# Patient Record
Sex: MALE | Race: WHITE | NOT HISPANIC OR LATINO | Employment: FULL TIME | URBAN - METROPOLITAN AREA
[De-identification: names, ages, dates, MRNs, and addresses within clinical notes are randomized per-mention and may not be internally consistent; named-entity substitution may affect disease eponyms.]

---

## 2017-01-16 ENCOUNTER — HOSPITAL ENCOUNTER (EMERGENCY)
Facility: HOSPITAL | Age: 67
Discharge: HOME | End: 2017-01-16
Attending: EMERGENCY MEDICINE
Payer: COMMERCIAL

## 2017-01-16 ENCOUNTER — TELEPHONE (OUTPATIENT)
Dept: SLEEP MEDICINE | Facility: OTHER | Age: 67
End: 2017-01-16

## 2017-01-16 VITALS
HEART RATE: 66 BPM | SYSTOLIC BLOOD PRESSURE: 131 MMHG | OXYGEN SATURATION: 98 % | RESPIRATION RATE: 18 BRPM | HEIGHT: 66 IN | TEMPERATURE: 98 F | BODY MASS INDEX: 29.73 KG/M2 | DIASTOLIC BLOOD PRESSURE: 78 MMHG | WEIGHT: 185 LBS

## 2017-01-16 DIAGNOSIS — R05.9 COUGH IN ADULT: ICD-10-CM

## 2017-01-16 DIAGNOSIS — R05.9 COUGH: ICD-10-CM

## 2017-01-16 DIAGNOSIS — J20.9 ACUTE BRONCHITIS, UNSPECIFIED ORGANISM: Primary | ICD-10-CM

## 2017-01-16 DIAGNOSIS — D84.9 IMMUNOCOMPROMISED STATE: ICD-10-CM

## 2017-01-16 LAB
ALBUMIN SERPL BCP-MCNC: 3.6 G/DL
ALP SERPL-CCNC: 37 U/L
ALT SERPL W/O P-5'-P-CCNC: 37 U/L
ANION GAP SERPL CALC-SCNC: 10 MMOL/L
AST SERPL-CCNC: 34 U/L
BASOPHILS # BLD AUTO: 0.01 K/UL
BASOPHILS NFR BLD: 0.1 %
BILIRUB SERPL-MCNC: 0.5 MG/DL
BUN SERPL-MCNC: 35 MG/DL
CALCIUM SERPL-MCNC: 8.9 MG/DL
CHLORIDE SERPL-SCNC: 102 MMOL/L
CO2 SERPL-SCNC: 27 MMOL/L
CREAT SERPL-MCNC: 2.3 MG/DL
DIFFERENTIAL METHOD: ABNORMAL
EOSINOPHIL # BLD AUTO: 0.2 K/UL
EOSINOPHIL NFR BLD: 2.2 %
ERYTHROCYTE [DISTWIDTH] IN BLOOD BY AUTOMATED COUNT: 13.7 %
EST. GFR  (AFRICAN AMERICAN): 33 ML/MIN/1.73 M^2
EST. GFR  (NON AFRICAN AMERICAN): 29 ML/MIN/1.73 M^2
GLUCOSE SERPL-MCNC: 94 MG/DL
HCT VFR BLD AUTO: 37.9 %
HGB BLD-MCNC: 13 G/DL
LYMPHOCYTES # BLD AUTO: 1.6 K/UL
LYMPHOCYTES NFR BLD: 23.6 %
MCH RBC QN AUTO: 33.2 PG
MCHC RBC AUTO-ENTMCNC: 34.3 %
MCV RBC AUTO: 97 FL
MONOCYTES # BLD AUTO: 1 K/UL
MONOCYTES NFR BLD: 14.7 %
NEUTROPHILS # BLD AUTO: 4 K/UL
NEUTROPHILS NFR BLD: 59.7 %
PLATELET # BLD AUTO: 170 K/UL
PMV BLD AUTO: 10.4 FL
POTASSIUM SERPL-SCNC: 3.8 MMOL/L
PROT SERPL-MCNC: 7 G/DL
RBC # BLD AUTO: 3.92 M/UL
SODIUM SERPL-SCNC: 139 MMOL/L
WBC # BLD AUTO: 6.81 K/UL

## 2017-01-16 PROCEDURE — 25000003 PHARM REV CODE 250: Performed by: EMERGENCY MEDICINE

## 2017-01-16 PROCEDURE — 96372 THER/PROPH/DIAG INJ SC/IM: CPT

## 2017-01-16 PROCEDURE — 63600175 PHARM REV CODE 636 W HCPCS: Performed by: EMERGENCY MEDICINE

## 2017-01-16 PROCEDURE — 99284 EMERGENCY DEPT VISIT MOD MDM: CPT | Mod: 25

## 2017-01-16 PROCEDURE — 85025 COMPLETE CBC W/AUTO DIFF WBC: CPT

## 2017-01-16 PROCEDURE — 80053 COMPREHEN METABOLIC PANEL: CPT

## 2017-01-16 RX ORDER — PROMETHAZINE HYDROCHLORIDE AND CODEINE PHOSPHATE 6.25; 1 MG/5ML; MG/5ML
SOLUTION ORAL
Qty: 240 ML | Refills: 0 | Status: SHIPPED | OUTPATIENT
Start: 2017-01-16 | End: 2017-02-08

## 2017-01-16 RX ORDER — LANOLIN ALCOHOL/MO/W.PET/CERES
400 CREAM (GRAM) TOPICAL DAILY
COMMUNITY
End: 2017-05-19

## 2017-01-16 RX ORDER — MOXIFLOXACIN HYDROCHLORIDE 400 MG/1
400 TABLET ORAL DAILY
Status: DISCONTINUED | OUTPATIENT
Start: 2017-01-16 | End: 2017-01-16 | Stop reason: HOSPADM

## 2017-01-16 RX ORDER — MOXIFLOXACIN HYDROCHLORIDE 400 MG/1
400 TABLET ORAL DAILY
Qty: 10 TABLET | Refills: 0 | Status: SHIPPED | OUTPATIENT
Start: 2017-01-16 | End: 2017-01-26

## 2017-01-16 RX ORDER — PROMETHAZINE HYDROCHLORIDE AND CODEINE PHOSPHATE 6.25; 1 MG/5ML; MG/5ML
SOLUTION ORAL
Qty: 240 ML | Refills: 0 | Status: SHIPPED | OUTPATIENT
Start: 2017-01-16 | End: 2017-01-16

## 2017-01-16 RX ORDER — MOXIFLOXACIN HYDROCHLORIDE 400 MG/1
400 TABLET ORAL DAILY
Qty: 10 TABLET | Refills: 0 | Status: SHIPPED | OUTPATIENT
Start: 2017-01-16 | End: 2017-01-16

## 2017-01-16 RX ORDER — IBUPROFEN 200 MG
1 CAPSULE ORAL DAILY
COMMUNITY
End: 2017-05-19

## 2017-01-16 RX ORDER — FLUTICASONE FUROATE AND VILANTEROL 200; 25 UG/1; UG/1
1 POWDER RESPIRATORY (INHALATION) DAILY
Qty: 28 EACH | Refills: 0 | Status: SHIPPED | OUTPATIENT
Start: 2017-01-16 | End: 2017-01-16

## 2017-01-16 RX ORDER — BETAMETHASONE SODIUM PHOSPHATE AND BETAMETHASONE ACETATE 3; 3 MG/ML; MG/ML
12 INJECTION, SUSPENSION INTRA-ARTICULAR; INTRALESIONAL; INTRAMUSCULAR; SOFT TISSUE
Status: COMPLETED | OUTPATIENT
Start: 2017-01-16 | End: 2017-01-16

## 2017-01-16 RX ORDER — FLUTICASONE FUROATE AND VILANTEROL 200; 25 UG/1; UG/1
1 POWDER RESPIRATORY (INHALATION) DAILY
Qty: 28 EACH | Refills: 0 | Status: SHIPPED | OUTPATIENT
Start: 2017-01-16 | End: 2017-02-08

## 2017-01-16 RX ADMIN — BETAMETHASONE SODIUM PHOSPHATE AND BETAMETHASONE ACETATE 12 MG: 3; 3 INJECTION, SUSPENSION INTRA-ARTICULAR; INTRALESIONAL; INTRAMUSCULAR at 09:01

## 2017-01-16 RX ADMIN — MOXIFLOXACIN HYDROCHLORIDE 400 MG: 400 TABLET, FILM COATED ORAL at 11:01

## 2017-01-16 NOTE — ED AVS SNAPSHOT
[facility]  [address] [place] [address]               Christophe Renteria   2017  7:55 AM   ED    Description:  Male : 1950   Department:  [facility]           Your Care was Coordinated By:     Provider Role From To    Jamarcus Cote MD Attending Provider 17 0756 --      Reason for Visit     Cough           Diagnoses this Visit        Comments    Acute bronchitis, unspecified organism    -  Primary Rule out right lower lobe pneumonia    Cough in adult         Cough         Immunocompromised state           ED Disposition     ED Disposition Condition Comment    Discharge             To Do List           Follow-up Information     Follow up with Gabriel Holman MD In 1 week.    Specialty:  Pulmonary Disease    Contact information:    [address] [place] [address] [address]  [facility] [address] [phone]          Follow up with Gabriel Holman MD In 3 days.    Specialty:  Pulmonary Disease    Contact information:    [address] [place] [address] [address]  [facility] [address]  [phone]         These Medications        Disp Refills Start End    moxifloxacin (AVELOX) 400 mg tablet 10 tablet 0 2017    Take 1 tablet (400 mg total) by mouth once daily. - Oral    [facility] [facility] #: [phone]       fluticasone-vilanterol (BREO ELLIPTA) 200-25 mcg/dose DsDv diskus inhaler 28 each 0 2017     Inhale 1 puff into the lungs once daily. - Inhalation    [place] [facility] [place] Ph #: [phone]       promethazine-codeine 6.25-10 mg/5 ml (PHENERGAN WITH CODEINE) 6.25-10 mg/5 mL syrup 240 mL 0 2017     1-2 teaspoons by mouth every 4-6 hours as needed for cough.    [place] [facility] [place] [facility] [place] Ph #: [phone]         [facility]     [facility] Nurse Care Line -   Assistance  Registered nurses in the [facility] provide clinical advisement, health education, appointment booking, and other advisory services.  Call for this free service at [phone].             Medications           Message regarding Medications     Verify the changes and/or additions to your medication regime listed below are the same as discussed with your clinician today.  If any of these changes or additions are incorrect, please notify your healthcare provider.        START taking these NEW medications        Refills    moxifloxacin (AVELOX) 400 mg tablet 0    Sig: Take 1 tablet (400 mg total) by mouth once daily.    Class: Print    Route: Oral    fluticasone-vilanterol (BREO ELLIPTA) 200-25 mcg/dose DsDv diskus inhaler 0    Sig: Inhale 1 puff into the lungs once daily.    Class: Print    Route: Inhalation    promethazine-codeine 6.25-10 mg/5 ml (PHENERGAN WITH CODEINE) 6.25-10 mg/5 mL syrup 0    Si-2 teaspoons by mouth every 4-6 hours as needed for cough.    Class: Print      These medications were administered today        Dose Freq    betamethasone acetate-betamethasone sodium phosphate injection 12 mg 12 mg ED 1 Time    Sig: Inject 2 mLs (12 mg total) into the muscle ED 1 Time.    Class: Normal    Route: Intramuscular           Verify that the below list of medications is an accurate representation of the medications you are currently taking.  If none reported, the list may be blank. If incorrect, please contact your healthcare provider. Carry this list with you in case of emergency.           Current Medications     ADALIMUMAB (HUMIRA PEN SUBQ) Inject 40 mg into the skin. EVERY 2 WEEKS    amlodipine (NORVASC) 10 MG tablet Take 1 tablet (10 mg total) by mouth once daily.    bisoprolol (ZEBETA) 10 MG tablet Take 1 tablet (10 mg total) by mouth once daily.    calcium citrate (CALCITRATE) 200 mg (950 mg) tablet Take 1 tablet by mouth once daily.    magnesium oxide (MAG-OX) 400 mg  "tablet Take 400 mg by mouth once daily.    multivitamin capsule Take 1 capsule by mouth once daily.    potassium citrate 15 mEq TbSR     pravastatin (PRAVACHOL) 40 MG tablet Take 1 tablet (40 mg total) by mouth once daily.    fluticasone-vilanterol (BREO ELLIPTA) 200-25 mcg/dose DsDv diskus inhaler Inhale 1 puff into the lungs once daily.    HUMIRA PEN PnKt injection     moxifloxacin (AVELOX) 400 mg tablet Take 1 tablet (400 mg total) by mouth once daily.    promethazine-codeine 6.25-10 mg/5 ml (PHENERGAN WITH CODEINE) 6.25-10 mg/5 mL syrup 1-2 teaspoons by mouth every 4-6 hours as needed for cough.           Clinical Reference Information           Your Vitals Were     BP Pulse Temp Resp Height Weight    141/80 64 98.1 °F (36.7 °C) 18 5' 6" (1.676 m) 83.9 kg (185 lb)    SpO2 BMI             96% 29.86 kg/m2         Allergies as of 1/16/2017     No Known Allergies      Immunizations Administered on Date of Encounter - 1/16/2017     None      ED Micro, Lab, POCT     Start Ordered       Status Ordering Provider    01/16/17 0939 01/16/17 0938  Comprehensive metabolic panel  STAT      Final result     01/16/17 0939 01/16/17 0938  CBC auto differential  STAT      Final result       ED Imaging Orders     Start Ordered       Status Ordering Provider    01/16/17 0843 01/16/17 0843  CT Chest Without Contrast  1 time imaging      Final result     01/16/17 0809 01/16/17 0808    1 time imaging,   Status:  Canceled      Canceled     01/16/17 0751 01/16/17 0750  X-Ray Chest PA And Lateral  1 time imaging      Final result         Discharge Instructions       Please return immediately if you get worse or if new problems develop.  Please follow-up with your pulmonologist in 72 hours.  Lots of liquids.  Return if you feel bad, develop shortness of breath, or develop fever.  Rest.    Your Scheduled Appointments     Jan 17, 2017  1:00 PM CST   Procedure with HOME STUDY, SLEEP   [facility] ([facility])    [address] [facility] [address]   [phone]              Your Future Surgeries/Procedures     Feb 13, 2017   Surgery with Ricky Sanders MD   [facility] ([facility]    [address] [place] [address]   [phone]              [facility] Sign-Up     Activating your [facility] account is as easy as 1-2-3!     1) Visit [email or web address], select Sign Up Now, enter this activation code and your date of birth, then select Next.  [email or web address]  Expires: 1/30/2017  9:59 AM      2) Create a username and password to use when you visit [facility] in the future and select a security question in case you lose your password and select Next.    3) Enter your e-mail address and click Sign Up!    Additional Information  If you have questions, please e-mail [email or web address] or call [phone] to talk to our [facility] staff. Remember, [facility] is NOT to be used for urgent needs. For medical emergencies, dial 911.          [facility] complies with applicable Federal civil rights laws and does not discriminate on the basis of race, color, national origin, age, disability, or sex.        Language Assistance Services     ATTENTION: Language assistance services are available, free of charge. Please call [phone].      ATENCIÓN: Si habla español, tiene a wilkerson disposición servicios gratuitos de asistencia lingüística. Llame al [phone].     CHÚ Ý: N?u b?n nói Ti?ng Vi?t, có các d?ch v? h? tr? ngôn ng? mi?n phí dành cho b?n. G?i s? [phone].

## 2017-01-16 NOTE — ED TRIAGE NOTES
Pt presents to ED with c/o non-productive cough x 1 week  He reports cough went away and returned on Friday night after a hunting trip  Also reports slight CP with cough and runny nose. Denies fever or chills.

## 2017-01-16 NOTE — ED PROVIDER NOTES
Encounter Date: 1/16/2017    SCRIBE #1 NOTE: I, Bebe Silverman, am scribing for, and in the presence of,  Jamarcus Cote MD. I have scribed the following portions of the note - Other sections scribed: HPI and ROS.   SCRIBE #2 NOTE: IDavid, am scribing for, and in the presence of,  Jamarcus Cote MD. I have scribed the following portions of the note - Other sections scribed: HPI, ROS.     History     Chief Complaint   Patient presents with    Cough     Pt reports cough x 5 days unrelieved by home medications     Review of patient's allergies indicates:  No Known Allergies  HPI Comments: CC: Cough    HPI: Patient is a 66 y.o. M with a past medical history of Arthritis; Brainstem infarction; Hypertension; Lumbar disc disease; Psoriatic arthritis; and Renal disease presents to the ED for evaluation of a worsening cough x1 week. He notes that his symptoms improved 5 days ago, but worsened 3 days ago. He describes his pain as acute and moderate. There's associated sharp chest pain secondary to cough, a sore throat, and rhinorrhea. There's no attempted treatment. Patient denies any fever, chills, dysuria, nausea, vomiting, diarrhea, or abdominal pain.    The history is provided by the patient. No  was used.     Past Medical History   Diagnosis Date    Arthritis     Brainstem infarction      without residual effects    Hypertension     Lumbar disc disease     Psoriatic arthritis     Renal disease      Past Medical History Pertinent Negatives   Diagnosis Date Noted    High fever 11/11/2015    Malignant hyperthermia 11/11/2015    PONV (postoperative nausea and vomiting) 11/11/2015    Pseudocholinesterase deficiency 11/11/2015     Past Surgical History   Procedure Laterality Date    Fracture surgery      Joint replacement      Rotator cuff repair Right     Ankle surgery Right     Back surgery       Family History   Problem Relation Age of Onset    Cancer Mother     Hypertension  Father     Stroke Father      Social History   Substance Use Topics    Smoking status: Never Smoker    Smokeless tobacco: None    Alcohol use 0.0 oz/week     Review of Systems   Constitutional: Negative for chills and fever.   HENT: Positive for rhinorrhea and sore throat.    Eyes: Negative for redness.   Respiratory: Positive for cough.    Cardiovascular: Positive for chest pain (secondary to cough).   Gastrointestinal: Negative for abdominal pain, diarrhea, nausea and vomiting.   Genitourinary: Negative for dysuria.   Musculoskeletal: Negative for back pain.   Skin: Negative for rash and wound.   Neurological: Negative for headaches.       Physical Exam   Initial Vitals   BP Pulse Resp Temp SpO2   01/16/17 0744 01/16/17 0744 01/16/17 0744 01/16/17 0744 01/16/17 0744   135/82 68 18 98.7 °F (37.1 °C) 95 %     Physical Exam  The patient was examined specifically for the following:   General:No significant distress, Good color, Warm and dry. Head and neck:Scalp atraumatic, Neck supple. Neurological:Appropriate conversation, Gross motor deficits. Eyes:Conjugate gaze, Clear corneas. ENT: No epistaxis. Cardiac: Regular rate and rhythm, Grossly normal heart tones. Pulmonary: Wheezing, Rales. Gastrointestinal: Abdominal tenderness, Abdominal distention. Musculoskeletal: Extremity deformity, Apparent pain with range of motion of the joints. Skin: Rash.   The findings on examination were normal except for the following: The patient has a cough during the physical examination.  The lungs are clear and free wheezing rales of the rhonchi.  Heart tones are normal.  The patient has a regular rate and rhythm.  The abdomen is nontender.  ED Course   Procedures  Labs Reviewed   COMPREHENSIVE METABOLIC PANEL - Abnormal; Notable for the following:        Result Value    BUN, Bld 35 (*)     Creatinine 2.3 (*)     Alkaline Phosphatase 37 (*)     eGFR if  33 (*)     eGFR if non  29 (*)     All other  components within normal limits   CBC W/ AUTO DIFFERENTIAL - Abnormal; Notable for the following:     RBC 3.92 (*)     Hemoglobin 13.0 (*)     Hematocrit 37.9 (*)     MCH 33.2 (*)     All other components within normal limits          X-Rays:   Independently Interpreted Readings:   Other Readings:  Chest x-ray fails to reveal pneumothorax pneumonia pleural effusion.  There is the possibility of a small right lung nodule    CT of the chest reveals a possible infiltrate posteriorly on the right.  No nodules are noted.      Medical decision making: Given the above consultation was obtained with Dr. Holman, who advised treatment with Avelox and Breo and began with codeine.  The case is, located by the fact that the patient is currently on Humira for arthritis.  I will treat as directed by , and send the patient to follow-up with him. We'll have him return if he gets worse or if new problems develop, especially feeling bad, fever, shortness of breath.      Dr. Holman the patient in the emergency room.  Avelox, Breo Elipta were recommended as well as Phenergan with codeine.  I doubt pneumonia.  CT was negative for significant mass.  We didn't identify a possible pneumonia on CT on the right side posteriorly.  I reviewed CT scan and x-ray with .              Scribe Attestation:   Scribe #1: I performed the above scribed service and the documentation accurately describes the services I performed. I attest to the accuracy of the note.  Scribe #2: I performed the above scribed service and the documentation accurately describes the services I performed. I attest to the accuracy of the note.    Attending Attestation:           Physician Attestation for Scribe:  Physician Attestation Statement for Scribe #1: I, Jamarcus Cote MD, reviewed documentation, as scribed by Bebe Silverman in my presence, and it is both accurate and complete.   Physician Attestation Statement for Scribe #2: I, Jamarcus Cote MD,  reviewed documentation, as scribed by David Rios in my presence, and it is both accurate and complete. I also acknowledge and confirm the content of the note done by Scribe #1.              ED Course     Clinical Impression:   The primary encounter diagnosis was Acute bronchitis, unspecified organism. Diagnoses of Cough in adult, Cough, and Immunocompromised state were also pertinent to this visit.          Jamarcus Cote MD  01/24/17 0151

## 2017-01-16 NOTE — TELEPHONE ENCOUNTER
Called to confirm his home sleep study for Jan 17th.  He canceled not feeling well.  He will call when he's able to reschedule.

## 2017-01-16 NOTE — DISCHARGE INSTRUCTIONS
Please return immediately if you get worse or if new problems develop.  Please follow-up with your pulmonologist in 72 hours.  Lots of liquids.  Return if you feel bad, develop shortness of breath, or develop fever.  Rest.

## 2017-01-23 ENCOUNTER — TELEPHONE (OUTPATIENT)
Dept: SLEEP MEDICINE | Facility: OTHER | Age: 67
End: 2017-01-23

## 2017-01-25 ENCOUNTER — TELEPHONE (OUTPATIENT)
Dept: SLEEP MEDICINE | Facility: OTHER | Age: 67
End: 2017-01-25

## 2017-01-25 NOTE — TELEPHONE ENCOUNTER
Returned patients call about schedule his home sleep study.  Not feeling well,he will call back to schedule.

## 2017-02-01 ENCOUNTER — TELEPHONE (OUTPATIENT)
Dept: SLEEP MEDICINE | Facility: OTHER | Age: 67
End: 2017-02-01

## 2017-02-08 ENCOUNTER — HOSPITAL ENCOUNTER (OUTPATIENT)
Dept: PREADMISSION TESTING | Facility: HOSPITAL | Age: 67
Discharge: HOME | End: 2017-02-08
Attending: ORTHOPAEDIC SURGERY
Payer: COMMERCIAL

## 2017-02-08 VITALS
BODY MASS INDEX: 29.41 KG/M2 | RESPIRATION RATE: 17 BRPM | TEMPERATURE: 97 F | HEIGHT: 67 IN | SYSTOLIC BLOOD PRESSURE: 128 MMHG | DIASTOLIC BLOOD PRESSURE: 77 MMHG | WEIGHT: 187.38 LBS | OXYGEN SATURATION: 98 % | HEART RATE: 61 BPM

## 2017-02-08 DIAGNOSIS — Z01.818 PRE-OP TESTING: Primary | ICD-10-CM

## 2017-02-08 LAB
APTT BLDCRRT: 26 SEC
BILIRUB UR QL STRIP: NEGATIVE
CLARITY UR: CLEAR
COLOR UR: YELLOW
GLUCOSE UR QL STRIP: ABNORMAL
HGB UR QL STRIP: NEGATIVE
INR PPP: 1
KETONES UR QL STRIP: NEGATIVE
LEUKOCYTE ESTERASE UR QL STRIP: NEGATIVE
NITRITE UR QL STRIP: NEGATIVE
PH UR STRIP: 7 [PH] (ref 5–8)
PROT UR QL STRIP: NEGATIVE
PROTHROMBIN TIME: 10.8 SEC
SP GR UR STRIP: 1.01 (ref 1–1.03)
URN SPEC COLLECT METH UR: ABNORMAL
UROBILINOGEN UR STRIP-ACNC: NEGATIVE EU/DL

## 2017-02-08 PROCEDURE — 93005 ELECTROCARDIOGRAM TRACING: CPT

## 2017-02-08 PROCEDURE — 85730 THROMBOPLASTIN TIME PARTIAL: CPT

## 2017-02-08 PROCEDURE — 85610 PROTHROMBIN TIME: CPT

## 2017-02-08 PROCEDURE — 36415 COLL VENOUS BLD VENIPUNCTURE: CPT

## 2017-02-08 PROCEDURE — 81003 URINALYSIS AUTO W/O SCOPE: CPT

## 2017-02-08 NOTE — IP AVS SNAPSHOT
[place] [address] [address] [address]  Phone: [phone]           Patient Discharge Instructions    Our goal is to set you up for success. This packet includes information on your condition, medications, and your home care. It will help you to care for yourself so you don't get sicker.     Please ask your nurse if you have any questions.        There are many details to remember when preparing for your surgery. Here is what you will need to do, please ask your nurse if there are more specific instructions and if you have any questions:    1. 24 hours before procedure Do not smoke or drink alcoholic beverages 24 hours prior to your procedure    2. Eating before procedure Do not eat or drink anything 8 hours before your procedure - this includes gum, mints, and candy.     3. Day of procedure Please remove all jewelry for the procedure. If you wear contact lenses, dentures, hearing aids or glasses, bring a container to put them in during your surgery and give to a family member for safekeeping.  If your doctor has scheduled you for an overnight stay, bring a small overnight bag with any personal items that you need.    4. After procedure Make arrangements in advance for transportation home by a responsible adult. It is not safe to drive a vehicle during the 24 hours following surgery.     PLEASE NOTE: You may be contacted the day before your surgery to confirm your surgery date and arrival time. The Surgery schedule has many variables which may affect the time of your surgery case. Family members should be available if your surgery time changes.                ** Verify the list of medication(s) below is accurate and up to date. Carry this with you in case of emergency. If your medications have changed, please notify your healthcare provider.             Medication List      TAKE these medications        Additional Info                      amlodipine 10 MG tablet   Commonly known  as:  NORVASC   Quantity:  90 tablet   Refills:  3   Dose:  10 mg    Instructions:  Take 1 tablet (10 mg total) by mouth once daily.     Begin Date    AM    Noon    PM    Bedtime       bisoprolol 10 MG tablet   Commonly known as:  ZEBETA   Quantity:  30 tablet   Refills:  11   Dose:  10 mg    Instructions:  Take 1 tablet (10 mg total) by mouth once daily.     Begin Date    AM    Noon    PM    Bedtime       calcium citrate 200 mg (950 mg) tablet   Commonly known as:  CALCITRATE   Refills:  0   Dose:  1 tablet    Instructions:  Take 1 tablet by mouth once daily.     Begin Date    AM    Noon    PM    Bedtime       HUMIRA PEN SUBQ   Refills:  0   Dose:  40 mg   What changed:  Another medication with the same name was removed. Continue taking this medication, and follow the directions you see here.    Instructions:  Inject 40 mg into the skin. EVERY 2 WEEKS     Begin Date    AM    Noon    PM    Bedtime       magnesium oxide 400 mg tablet   Commonly known as:  MAG-OX   Refills:  0   Dose:  400 mg    Instructions:  Take 400 mg by mouth once daily.     Begin Date    AM    Noon    PM    Bedtime       multivitamin capsule   Refills:  0   Dose:  1 capsule    Instructions:  Take 1 capsule by mouth once daily.     Begin Date    AM    Noon    PM    Bedtime       potassium citrate 15 mEq Tbsr   Refills:  0      Begin Date    AM    Noon    PM    Bedtime       pravastatin 40 MG tablet   Commonly known as:  PRAVACHOL   Quantity:  90 tablet   Refills:  3   Dose:  40 mg    Instructions:  Take 1 tablet (40 mg total) by mouth once daily.     Begin Date    AM    Noon    PM    Bedtime                  Please bring to all follow up appointments:    1. A copy of your discharge instructions.  2. All medicines you are currently taking in their original bottles.  3. Identification and insurance card.    Please arrive 15 minutes ahead of scheduled appointment time.    Please call 24 hours in advance if you must reschedule your appointment and/or  time.        Your Scheduled Appointments     Feb 12, 2017  9:00 AM CST   Non-Fasting Lab with [facility]    [address] [id number] [phone]              Your Future Surgeries/Procedures     Feb 13, 2017   Surgery with Ricky Sanders MD   [facility] ([facility]    [address] [id number] [phone]                Discharge Instructions     Future Orders    Type & Screen         Discharge Instructions       For this procedure you will shower at home the night before and also the morning of surgery with HIBICLENS soap provided by the pre op nurse. Do not use this soap on your face or genitals. You will shower a third time here at the hospital on the morning of surgery. Rinse completely after each shower.  Please place clean linens on your bed the night before surgery. Please wear fresh clean clothing after each shower.  No shaving of procedural area at least 4-5 days before surgery due to increased risk of skin irritation and/or possible infection.      Your surgery is scheduled for _Monday Feb 13, 2017___.    Call [phone] between 2 p.m. and 5 p.m. on   _Friday___ to find out your arrival time for the day of your surgery.      Please report to SAME DAY SURGERY UNIT on the 2nd FLOOR at _______ a.m.  Use front door entrance. The doors open at 0530 am.        INSTRUCTIONS IMPORTANT!!!  ¨ Do not eat or drink after 12 midnight-including water. OK to brush teeth, no   gum, candy or mints!    ¨ Take only these medicines with a small swallow of water-morning of surgery.  Take Amlodipine and Zebeta am of surgery with swallow of water.      _x___  Return to Hospital Lab on _Sunday 2-12-17 at 9:00 am__for additional blood test.    _x___  Prep instructions:   SHOWER     _x___  Please shower using Hibiclens soap the night before AND  the morning of your surgery/procedure. Do not use  "Hibiclens on your face or genitals     _x___  No powder, lotions or creams to surgical area.  _x___  You may wear only deodorant on the day of surgery.  _x___  Please remove all jewelry, including piercings and leave at home.  _x___  No money or valuables needed. Please leave at home.  You may bring your cell phone.    _x___  Wear loose fitting clothing. Allow for dressings, bandages.  _x___  Stop Aspirin, Ibuprofen, Motrin and Aleve at least 3-5 days before surgery, unless otherwise instructed by your doctor, or the nurse.              You MAY use Tylenol/acetaminophen until day of surgery.    _x___  Call MD for temperature above 101 degrees.        _x___ Stop taking any Fish Oil supplement or any Vitamins that contain Vitamin E at least 5 days prior to surgery.          I have read or had read and explained to me, and understand the above information.  Additional comments or instructions:Please call   [phone] if you have any questions regarding the instructions above.                 Admission Information     Date & Time Provider Department CSN    2/8/2017  8:30 AM Ricky Sanders MD [facility] [id number]      Care Providers     Provider Role Specialty Primary office phone    Ricky Sanders MD Attending Provider Orthopedic Surgery [phone]      Your Vitals Were     BP Pulse Temp Resp Height Weight    128/77 (BP Location: Left arm, Patient Position: Sitting, BP Method: Automatic) 61 97.4 °F (36.3 °C) (Oral) 17 5' 7" (1.702 m) 85 kg (187 lb 6.3 oz)    SpO2 BMI             98% 29.35 kg/m2         Recent Lab Values        2/18/2016                           3:55 PM           A1C 5.2                       Allergies as of 2/8/2017     No Known Allergies      [facility]     [facility] Nurse Care Line - 24/7 Assistance  Unless otherwise directed by your provider, please contact [facility], our nurse care line that is available for 24/7 assistance.     Registered nurses in " the [facility] provide clinical advisement, health education, appointment booking, and other advisory services.  Call for this free service at [phone].        Advance Directives     An advance directive is a document which, in the event you are no longer able to make decisions for yourself, tells your healthcare team what kind of treatment you do or do not want to receive, or who you would like to make those decisions for you.  If you do not currently have an advance directive, [facility] encourages you to create one.  For more information call:  [phone] ([phone]), [phone] ([phone]),  or log on to [email or web address].        Smoking Cessation     If you would like to quit smoking:   You may be eligible for free services if you are a [place] resident and started smoking cigarettes before September 1, 1988.  Call the Smoking Cessation Trust ([facility]) toll free at (250) [phone] or ([phone].   Call 2-947-QUIT-NOW if you do not meet the above criteria.            Language Assistance Services     ATTENTION: Language assistance services are available, free of charge. Please call [phone].      ATENCIÓN: Si habla español, tiene a wilkerson disposición servicios gratuitos de asistencia lingüística. Llame al [phone].     CHÚ Ý: N?u b?n nói Ti?ng Vi?t, có các d?ch v? h? tr? ngôn ng? mi?n phí dành cho b?n. G?i s? [phone].        [facility] Sign-Up     Activating your [facility] account is as easy as 1-2-3!     1) Visit [email or web address], select Sign Up Now, enter this activation code and your date of birth, then select Next.  [email or web address]  Expires: 3/25/2017  9:14 AM      2) Create a username and password to use when you visit [facility] in the future and select a security question in case you lose your password and select Next.    3) Enter your e-mail address and click Sign Up!    Additional Information  If you have questions, please e-mail [email or web address] or  call [phone] to talk to our [facility] staff. Remember, [facility] is NOT to be used for urgent needs. For medical emergencies, dial 911.          [facility] complies with applicable Federal civil rights laws and does not discriminate on the basis of race, color, national origin, age, disability, or sex.

## 2017-02-08 NOTE — DISCHARGE INSTRUCTIONS
For this procedure you will shower at home the night before and also the morning of surgery with HIBICLENS soap provided by the pre op nurse. Do not use this soap on your face or genitals. You will shower a third time here at the hospital on the morning of surgery. Rinse completely after each shower.  Please place clean linens on your bed the night before surgery. Please wear fresh clean clothing after each shower.  No shaving of procedural area at least 4-5 days before surgery due to increased risk of skin irritation and/or possible infection.      Your surgery is scheduled for _Monday Feb 13, 2017___.    Call [phone] between 2 p.m. and 5 p.m. on   _Friday___ to find out your arrival time for the day of your surgery.      Please report to SAME DAY SURGERY UNIT on the 2nd FLOOR at _______ a.m.  Use front door entrance. The doors open at 0530 am.        INSTRUCTIONS IMPORTANT!!!  ¨ Do not eat or drink after 12 midnight-including water. OK to brush teeth, no   gum, candy or mints!    ¨ Take only these medicines with a small swallow of water-morning of surgery.  Take Amlodipine and Zebeta am of surgery with swallow of water.      _x___  Return to Hospital Lab on _Sunday 2-12-17 at 9:00 am__for additional blood test.    _x___  Prep instructions:   SHOWER     _x___  Please shower using Hibiclens soap the night before AND  the morning of your surgery/procedure. Do not use Hibiclens on your face or genitals     _x___  No powder, lotions or creams to surgical area.  _x___  You may wear only deodorant on the day of surgery.  _x___  Please remove all jewelry, including piercings and leave at home.  _x___  No money or valuables needed. Please leave at home.  You may bring your cell phone.    _x___  Wear loose fitting clothing. Allow for dressings, bandages.  _x___  Stop Aspirin, Ibuprofen, Motrin and Aleve at least 3-5 days before surgery, unless otherwise instructed by your doctor, or the nurse.              You MAY use  Tylenol/acetaminophen until day of surgery.    _x___  Call MD for temperature above 101 degrees.        _x___ Stop taking any Fish Oil supplement or any Vitamins that contain Vitamin E at least 5 days prior to surgery.          I have read or had read and explained to me, and understand the above information.  Additional comments or instructions:Please call   [phone] if you have any questions regarding the instructions above.

## 2017-02-08 NOTE — PRE-PROCEDURE INSTRUCTIONS
Pre operative instructions, medication directives and pain scales/post op pain management discussed with patient. Instructed to wash with Hibiclens prior to surgery as per protocol.  All questions were answered. Patient re-assured regarding surgical procedure and day of surgery routine as needed. Patient verbalized understanding of pre-op instructions.

## 2017-02-12 ENCOUNTER — ANESTHESIA EVENT (OUTPATIENT)
Dept: SURGERY | Facility: HOSPITAL | Age: 67
DRG: 470 | End: 2017-02-12
Payer: COMMERCIAL

## 2017-02-13 ENCOUNTER — SURGERY (OUTPATIENT)
Age: 67
End: 2017-02-13

## 2017-02-13 ENCOUNTER — HOSPITAL ENCOUNTER (INPATIENT)
Facility: HOSPITAL | Age: 67
LOS: 2 days | Discharge: HOME HEALTH | DRG: 470 | End: 2017-02-15
Attending: ORTHOPAEDIC SURGERY | Admitting: ORTHOPAEDIC SURGERY
Payer: COMMERCIAL

## 2017-02-13 ENCOUNTER — ANESTHESIA (OUTPATIENT)
Dept: SURGERY | Facility: HOSPITAL | Age: 67
DRG: 470 | End: 2017-02-13
Payer: COMMERCIAL

## 2017-02-13 DIAGNOSIS — M51.9 LUMBAR DISC DISEASE: ICD-10-CM

## 2017-02-13 DIAGNOSIS — M17.12 DEGENERATIVE JOINT DISEASE OF KNEE, LEFT: ICD-10-CM

## 2017-02-13 DIAGNOSIS — L40.50 PSORIATIC ARTHRITIS: ICD-10-CM

## 2017-02-13 DIAGNOSIS — M17.12 OSTEOARTHRITIS OF LEFT KNEE, UNSPECIFIED OSTEOARTHRITIS TYPE: Primary | ICD-10-CM

## 2017-02-13 LAB
ANION GAP SERPL CALC-SCNC: 9 MMOL/L
BASOPHILS # BLD AUTO: 0.03 K/UL
BASOPHILS NFR BLD: 0.6 %
BUN SERPL-MCNC: 35 MG/DL
CALCIUM SERPL-MCNC: 8.6 MG/DL
CHLORIDE SERPL-SCNC: 107 MMOL/L
CO2 SERPL-SCNC: 23 MMOL/L
CREAT SERPL-MCNC: 2.2 MG/DL
DIFFERENTIAL METHOD: ABNORMAL
EOSINOPHIL # BLD AUTO: 0.2 K/UL
EOSINOPHIL NFR BLD: 3.6 %
ERYTHROCYTE [DISTWIDTH] IN BLOOD BY AUTOMATED COUNT: 14 %
EST. GFR  (AFRICAN AMERICAN): 35 ML/MIN/1.73 M^2
EST. GFR  (NON AFRICAN AMERICAN): 31 ML/MIN/1.73 M^2
GLUCOSE SERPL-MCNC: 94 MG/DL
HCT VFR BLD AUTO: 33.6 %
HGB BLD-MCNC: 11.3 G/DL
LYMPHOCYTES # BLD AUTO: 1.7 K/UL
LYMPHOCYTES NFR BLD: 31.4 %
MCH RBC QN AUTO: 33.3 PG
MCHC RBC AUTO-ENTMCNC: 33.6 %
MCV RBC AUTO: 99 FL
MONOCYTES # BLD AUTO: 0.7 K/UL
MONOCYTES NFR BLD: 12.3 %
NEUTROPHILS # BLD AUTO: 2.8 K/UL
NEUTROPHILS NFR BLD: 52.1 %
PLATELET # BLD AUTO: 166 K/UL
PMV BLD AUTO: 10.3 FL
POTASSIUM SERPL-SCNC: 3.9 MMOL/L
RBC # BLD AUTO: 3.39 M/UL
SODIUM SERPL-SCNC: 139 MMOL/L
WBC # BLD AUTO: 5.35 K/UL

## 2017-02-13 PROCEDURE — D9220A PRA ANESTHESIA: Mod: ANES,,, | Performed by: ANESTHESIOLOGY

## 2017-02-13 PROCEDURE — 97161 PT EVAL LOW COMPLEX 20 MIN: CPT

## 2017-02-13 PROCEDURE — 63600175 PHARM REV CODE 636 W HCPCS: Performed by: ORTHOPAEDIC SURGERY

## 2017-02-13 PROCEDURE — 36000710: Performed by: ORTHOPAEDIC SURGERY

## 2017-02-13 PROCEDURE — 71000039 HC RECOVERY, EACH ADD'L HOUR: Performed by: ORTHOPAEDIC SURGERY

## 2017-02-13 PROCEDURE — 85025 COMPLETE CBC W/AUTO DIFF WBC: CPT

## 2017-02-13 PROCEDURE — G8978 MOBILITY CURRENT STATUS: HCPCS | Mod: CJ

## 2017-02-13 PROCEDURE — C1776 JOINT DEVICE (IMPLANTABLE): HCPCS | Performed by: ORTHOPAEDIC SURGERY

## 2017-02-13 PROCEDURE — 25000003 PHARM REV CODE 250: Performed by: ANESTHESIOLOGY

## 2017-02-13 PROCEDURE — 25000003 PHARM REV CODE 250: Performed by: ORTHOPAEDIC SURGERY

## 2017-02-13 PROCEDURE — 11000001 HC ACUTE MED/SURG PRIVATE ROOM

## 2017-02-13 PROCEDURE — 25000003 PHARM REV CODE 250

## 2017-02-13 PROCEDURE — G8979 MOBILITY GOAL STATUS: HCPCS | Mod: CI

## 2017-02-13 PROCEDURE — 97530 THERAPEUTIC ACTIVITIES: CPT

## 2017-02-13 PROCEDURE — 27200688 HC TRAY, SPINAL-HYPER/ ISOBARIC: Performed by: ANESTHESIOLOGY

## 2017-02-13 PROCEDURE — 27800517 HC TRAY,EPIDURAL-CONTINUOUS: Performed by: ANESTHESIOLOGY

## 2017-02-13 PROCEDURE — 63600175 PHARM REV CODE 636 W HCPCS: Performed by: NURSE ANESTHETIST, CERTIFIED REGISTERED

## 2017-02-13 PROCEDURE — 27200750 HC INSULATED NEEDLE/ STIMUPLEX: Performed by: ANESTHESIOLOGY

## 2017-02-13 PROCEDURE — 25000003 PHARM REV CODE 250: Performed by: NURSE ANESTHETIST, CERTIFIED REGISTERED

## 2017-02-13 PROCEDURE — 0SRD0J9 REPLACEMENT OF LEFT KNEE JOINT WITH SYNTHETIC SUBSTITUTE, CEMENTED, OPEN APPROACH: ICD-10-PCS | Performed by: ORTHOPAEDIC SURGERY

## 2017-02-13 PROCEDURE — 27201423 OPTIME MED/SURG SUP & DEVICES STERILE SUPPLY: Performed by: ORTHOPAEDIC SURGERY

## 2017-02-13 PROCEDURE — 36415 COLL VENOUS BLD VENIPUNCTURE: CPT

## 2017-02-13 PROCEDURE — 37000009 HC ANESTHESIA EA ADD 15 MINS: Performed by: ORTHOPAEDIC SURGERY

## 2017-02-13 PROCEDURE — 97110 THERAPEUTIC EXERCISES: CPT

## 2017-02-13 PROCEDURE — 80048 BASIC METABOLIC PNL TOTAL CA: CPT

## 2017-02-13 PROCEDURE — 36000711: Performed by: ORTHOPAEDIC SURGERY

## 2017-02-13 PROCEDURE — D9220A PRA ANESTHESIA: Mod: CRNA,,, | Performed by: NURSE ANESTHETIST, CERTIFIED REGISTERED

## 2017-02-13 PROCEDURE — C1713 ANCHOR/SCREW BN/BN,TIS/BN: HCPCS | Performed by: ORTHOPAEDIC SURGERY

## 2017-02-13 PROCEDURE — 37000008 HC ANESTHESIA 1ST 15 MINUTES: Performed by: ORTHOPAEDIC SURGERY

## 2017-02-13 PROCEDURE — 71000033 HC RECOVERY, INTIAL HOUR: Performed by: ORTHOPAEDIC SURGERY

## 2017-02-13 DEVICE — CEMENT BONE SMARTSET HV 40 GR.: Type: IMPLANTABLE DEVICE | Site: KNEE | Status: FUNCTIONAL

## 2017-02-13 DEVICE — IMPLANTABLE DEVICE: Type: IMPLANTABLE DEVICE | Site: KNEE | Status: FUNCTIONAL

## 2017-02-13 DEVICE — PATELLA OVAL DOME 32MM: Type: IMPLANTABLE DEVICE | Site: KNEE | Status: FUNCTIONAL

## 2017-02-13 DEVICE — TRAY TIBIAL CEMENT SZ 4 COBALT: Type: IMPLANTABLE DEVICE | Site: KNEE | Status: FUNCTIONAL

## 2017-02-13 RX ORDER — OXYCODONE HYDROCHLORIDE 5 MG/1
10 TABLET ORAL EVERY 4 HOURS PRN
Status: DISCONTINUED | OUTPATIENT
Start: 2017-02-13 | End: 2017-02-14

## 2017-02-13 RX ORDER — CEFAZOLIN SODIUM 2 G/50ML
SOLUTION INTRAVENOUS
Status: DISPENSED
Start: 2017-02-13 | End: 2017-02-13

## 2017-02-13 RX ORDER — ACETAMINOPHEN 10 MG/ML
1000 INJECTION, SOLUTION INTRAVENOUS EVERY 8 HOURS
Status: COMPLETED | OUTPATIENT
Start: 2017-02-13 | End: 2017-02-14

## 2017-02-13 RX ORDER — PRAVASTATIN SODIUM 40 MG/1
40 TABLET ORAL DAILY
Status: DISCONTINUED | OUTPATIENT
Start: 2017-02-13 | End: 2017-02-15 | Stop reason: HOSPADM

## 2017-02-13 RX ORDER — GABAPENTIN 400 MG/1
1200 CAPSULE ORAL
Status: COMPLETED | OUTPATIENT
Start: 2017-02-13 | End: 2017-02-13

## 2017-02-13 RX ORDER — MORPHINE SULFATE 10 MG/ML
2 INJECTION INTRAMUSCULAR; INTRAVENOUS; SUBCUTANEOUS ONCE
Status: COMPLETED | OUTPATIENT
Start: 2017-02-13 | End: 2017-02-13

## 2017-02-13 RX ORDER — MORPHINE SULFATE 10 MG/ML
2 INJECTION INTRAMUSCULAR; INTRAVENOUS; SUBCUTANEOUS
Status: DISCONTINUED | OUTPATIENT
Start: 2017-02-13 | End: 2017-02-13

## 2017-02-13 RX ORDER — LIDOCAINE HYDROCHLORIDE 10 MG/ML
1 INJECTION, SOLUTION EPIDURAL; INFILTRATION; INTRACAUDAL; PERINEURAL ONCE AS NEEDED
Status: DISCONTINUED | OUTPATIENT
Start: 2017-02-13 | End: 2017-02-13 | Stop reason: HOSPADM

## 2017-02-13 RX ORDER — METHYLPREDNISOLONE ACETATE 40 MG/ML
INJECTION, SUSPENSION INTRA-ARTICULAR; INTRALESIONAL; INTRAMUSCULAR; SOFT TISSUE
Status: DISCONTINUED | OUTPATIENT
Start: 2017-02-13 | End: 2017-02-13 | Stop reason: HOSPADM

## 2017-02-13 RX ORDER — ZOLPIDEM TARTRATE 5 MG/1
5 TABLET ORAL NIGHTLY PRN
Status: DISCONTINUED | OUTPATIENT
Start: 2017-02-13 | End: 2017-02-15 | Stop reason: HOSPADM

## 2017-02-13 RX ORDER — CELECOXIB 100 MG/1
400 CAPSULE ORAL
Status: ACTIVE | OUTPATIENT
Start: 2017-02-13 | End: 2017-02-13

## 2017-02-13 RX ORDER — CEFAZOLIN SODIUM 2 G/50ML
2 SOLUTION INTRAVENOUS
Status: COMPLETED | OUTPATIENT
Start: 2017-02-13 | End: 2017-02-14

## 2017-02-13 RX ORDER — CEFAZOLIN SODIUM 2 G/50ML
2 SOLUTION INTRAVENOUS
Status: COMPLETED | OUTPATIENT
Start: 2017-02-13 | End: 2017-02-13

## 2017-02-13 RX ORDER — HYDROMORPHONE HYDROCHLORIDE 2 MG/ML
0.2 INJECTION, SOLUTION INTRAMUSCULAR; INTRAVENOUS; SUBCUTANEOUS EVERY 5 MIN PRN
Status: DISCONTINUED | OUTPATIENT
Start: 2017-02-13 | End: 2017-02-14

## 2017-02-13 RX ORDER — TRANEXAMIC ACID 100 MG/ML
1500 INJECTION, SOLUTION INTRAVENOUS ONCE
Status: DISCONTINUED | OUTPATIENT
Start: 2017-02-13 | End: 2017-02-13 | Stop reason: CLARIF

## 2017-02-13 RX ORDER — SODIUM CHLORIDE, SODIUM LACTATE, POTASSIUM CHLORIDE, CALCIUM CHLORIDE 600; 310; 30; 20 MG/100ML; MG/100ML; MG/100ML; MG/100ML
INJECTION, SOLUTION INTRAVENOUS CONTINUOUS
Status: DISCONTINUED | OUTPATIENT
Start: 2017-02-13 | End: 2017-02-15

## 2017-02-13 RX ORDER — BUPIVACAINE HYDROCHLORIDE 5 MG/ML
INJECTION, SOLUTION EPIDURAL; INTRACAUDAL
Status: COMPLETED
Start: 2017-02-13 | End: 2017-02-13

## 2017-02-13 RX ORDER — MUPIROCIN 20 MG/G
1 OINTMENT TOPICAL 2 TIMES DAILY
Status: DISCONTINUED | OUTPATIENT
Start: 2017-02-13 | End: 2017-02-15 | Stop reason: HOSPADM

## 2017-02-13 RX ORDER — BISACODYL 10 MG
10 SUPPOSITORY, RECTAL RECTAL DAILY PRN
Status: DISCONTINUED | OUTPATIENT
Start: 2017-02-13 | End: 2017-02-15 | Stop reason: HOSPADM

## 2017-02-13 RX ORDER — SODIUM CHLORIDE, SODIUM LACTATE, POTASSIUM CHLORIDE, CALCIUM CHLORIDE 600; 310; 30; 20 MG/100ML; MG/100ML; MG/100ML; MG/100ML
INJECTION, SOLUTION INTRAVENOUS CONTINUOUS
Status: DISCONTINUED | OUTPATIENT
Start: 2017-02-13 | End: 2017-02-13

## 2017-02-13 RX ORDER — POTASSIUM CITRATE 5 MEQ/1
15 TABLET, EXTENDED RELEASE ORAL 2 TIMES DAILY
Status: DISCONTINUED | OUTPATIENT
Start: 2017-02-13 | End: 2017-02-15 | Stop reason: HOSPADM

## 2017-02-13 RX ORDER — CEFUROXIME SODIUM 750 MG/1
INJECTION, POWDER, FOR SOLUTION INTRAMUSCULAR; INTRAVENOUS
Status: DISCONTINUED | OUTPATIENT
Start: 2017-02-13 | End: 2017-02-13 | Stop reason: HOSPADM

## 2017-02-13 RX ORDER — LIDOCAINE HCL/PF 100 MG/5ML
SYRINGE (ML) INTRAVENOUS
Status: DISCONTINUED | OUTPATIENT
Start: 2017-02-13 | End: 2017-02-13

## 2017-02-13 RX ORDER — ONDANSETRON 2 MG/ML
INJECTION INTRAMUSCULAR; INTRAVENOUS
Status: DISCONTINUED | OUTPATIENT
Start: 2017-02-13 | End: 2017-02-13

## 2017-02-13 RX ORDER — ONDANSETRON 4 MG/1
4 TABLET, ORALLY DISINTEGRATING ORAL EVERY 6 HOURS PRN
Status: DISCONTINUED | OUTPATIENT
Start: 2017-02-13 | End: 2017-02-15 | Stop reason: HOSPADM

## 2017-02-13 RX ORDER — AMLODIPINE BESYLATE 5 MG/1
10 TABLET ORAL DAILY
Status: DISCONTINUED | OUTPATIENT
Start: 2017-02-13 | End: 2017-02-15 | Stop reason: HOSPADM

## 2017-02-13 RX ORDER — MORPHINE SULFATE 10 MG/ML
4 INJECTION INTRAMUSCULAR; INTRAVENOUS; SUBCUTANEOUS
Status: DISCONTINUED | OUTPATIENT
Start: 2017-02-13 | End: 2017-02-14

## 2017-02-13 RX ORDER — SODIUM CHLORIDE 0.9 % (FLUSH) 0.9 %
3 SYRINGE (ML) INJECTION
Status: DISCONTINUED | OUTPATIENT
Start: 2017-02-13 | End: 2017-02-15 | Stop reason: HOSPADM

## 2017-02-13 RX ORDER — POTASSIUM CITRATE 15 MEQ/1
TABLET, EXTENDED RELEASE ORAL DAILY
Status: DISCONTINUED | OUTPATIENT
Start: 2017-02-13 | End: 2017-02-13

## 2017-02-13 RX ORDER — MIDAZOLAM HYDROCHLORIDE 1 MG/ML
INJECTION, SOLUTION INTRAMUSCULAR; INTRAVENOUS
Status: DISCONTINUED | OUTPATIENT
Start: 2017-02-13 | End: 2017-02-13

## 2017-02-13 RX ORDER — FAMOTIDINE 20 MG/1
20 TABLET, FILM COATED ORAL 2 TIMES DAILY
Status: DISCONTINUED | OUTPATIENT
Start: 2017-02-13 | End: 2017-02-13

## 2017-02-13 RX ORDER — GENTAMICIN SULFATE 40 MG/ML
INJECTION, SOLUTION INTRAMUSCULAR; INTRAVENOUS
Status: DISCONTINUED | OUTPATIENT
Start: 2017-02-13 | End: 2017-02-13 | Stop reason: HOSPADM

## 2017-02-13 RX ORDER — PROPOFOL 10 MG/ML
VIAL (ML) INTRAVENOUS CONTINUOUS PRN
Status: DISCONTINUED | OUTPATIENT
Start: 2017-02-13 | End: 2017-02-13

## 2017-02-13 RX ORDER — LANOLIN ALCOHOL/MO/W.PET/CERES
400 CREAM (GRAM) TOPICAL DAILY
Status: DISCONTINUED | OUTPATIENT
Start: 2017-02-13 | End: 2017-02-15 | Stop reason: HOSPADM

## 2017-02-13 RX ORDER — FENTANYL CITRATE 50 UG/ML
INJECTION, SOLUTION INTRAMUSCULAR; INTRAVENOUS
Status: DISCONTINUED | OUTPATIENT
Start: 2017-02-13 | End: 2017-02-13

## 2017-02-13 RX ORDER — FAMOTIDINE 20 MG/1
20 TABLET, FILM COATED ORAL DAILY
Status: DISCONTINUED | OUTPATIENT
Start: 2017-02-13 | End: 2017-02-15 | Stop reason: HOSPADM

## 2017-02-13 RX ORDER — ASPIRIN 325 MG
325 TABLET, DELAYED RELEASE (ENTERIC COATED) ORAL DAILY
Status: DISCONTINUED | OUTPATIENT
Start: 2017-02-13 | End: 2017-02-15 | Stop reason: HOSPADM

## 2017-02-13 RX ORDER — ONDANSETRON 2 MG/ML
4 INJECTION INTRAMUSCULAR; INTRAVENOUS EVERY 12 HOURS PRN
Status: DISCONTINUED | OUTPATIENT
Start: 2017-02-13 | End: 2017-02-13

## 2017-02-13 RX ORDER — TAMSULOSIN HYDROCHLORIDE 0.4 MG/1
0.4 CAPSULE ORAL DAILY
Status: DISCONTINUED | OUTPATIENT
Start: 2017-02-13 | End: 2017-02-15 | Stop reason: HOSPADM

## 2017-02-13 RX ORDER — BUPIVACAINE HYDROCHLORIDE 2.5 MG/ML
INJECTION, SOLUTION INFILTRATION; PERINEURAL
Status: DISCONTINUED | OUTPATIENT
Start: 2017-02-13 | End: 2017-02-13 | Stop reason: HOSPADM

## 2017-02-13 RX ORDER — DOCUSATE SODIUM 100 MG/1
100 CAPSULE, LIQUID FILLED ORAL EVERY 12 HOURS
Status: DISCONTINUED | OUTPATIENT
Start: 2017-02-13 | End: 2017-02-15 | Stop reason: HOSPADM

## 2017-02-13 RX ORDER — BISOPROLOL FUMARATE 5 MG/1
10 TABLET, FILM COATED ORAL DAILY
Status: DISCONTINUED | OUTPATIENT
Start: 2017-02-13 | End: 2017-02-15 | Stop reason: HOSPADM

## 2017-02-13 RX ADMIN — METHYLPREDNISOLONE ACETATE 40 MG: 40 INJECTION, SUSPENSION INTRA-ARTICULAR; INTRALESIONAL; INTRAMUSCULAR; SOFT TISSUE at 08:02

## 2017-02-13 RX ADMIN — SODIUM CHLORIDE, SODIUM LACTATE, POTASSIUM CHLORIDE, AND CALCIUM CHLORIDE: .6; .31; .03; .02 INJECTION, SOLUTION INTRAVENOUS at 08:02

## 2017-02-13 RX ADMIN — MUPIROCIN 1 G: 20 OINTMENT TOPICAL at 09:02

## 2017-02-13 RX ADMIN — TAMSULOSIN HYDROCHLORIDE 0.4 MG: 0.4 CAPSULE ORAL at 12:02

## 2017-02-13 RX ADMIN — LIDOCAINE HYDROCHLORIDE 60 MG: 20 INJECTION, SOLUTION INTRAVENOUS at 07:02

## 2017-02-13 RX ADMIN — BISOPROLOL FUMARATE 10 MG: 5 TABLET, COATED ORAL at 12:02

## 2017-02-13 RX ADMIN — FENTANYL CITRATE 50 MCG: 50 INJECTION INTRAMUSCULAR; INTRAVENOUS at 07:02

## 2017-02-13 RX ADMIN — ONDANSETRON 4 MG: 2 INJECTION, SOLUTION INTRAMUSCULAR; INTRAVENOUS at 07:02

## 2017-02-13 RX ADMIN — POTASSIUM CITRATE 15 MEQ: 5 TABLET ORAL at 02:02

## 2017-02-13 RX ADMIN — MAGNESIUM OXIDE TAB 400 MG (241.3 MG ELEMENTAL MG) 400 MG: 400 (241.3 MG) TAB at 12:02

## 2017-02-13 RX ADMIN — GABAPENTIN 1200 MG: 400 CAPSULE ORAL at 06:02

## 2017-02-13 RX ADMIN — DOCUSATE SODIUM 100 MG: 100 CAPSULE, LIQUID FILLED ORAL at 12:02

## 2017-02-13 RX ADMIN — ACETAMINOPHEN 1000 MG: 10 INJECTION, SOLUTION INTRAVENOUS at 01:02

## 2017-02-13 RX ADMIN — ASPIRIN 325 MG: 325 TABLET, DELAYED RELEASE ORAL at 02:02

## 2017-02-13 RX ADMIN — FAMOTIDINE 20 MG: 20 TABLET, FILM COATED ORAL at 02:02

## 2017-02-13 RX ADMIN — AMLODIPINE BESYLATE 10 MG: 5 TABLET ORAL at 12:02

## 2017-02-13 RX ADMIN — ONDANSETRON 4 MG: 2 INJECTION INTRAMUSCULAR; INTRAVENOUS at 08:02

## 2017-02-13 RX ADMIN — TRANEXAMIC ACID 1.5 G: 100 INJECTION, SOLUTION INTRAVENOUS at 08:02

## 2017-02-13 RX ADMIN — MORPHINE SULFATE 4 MG: 10 INJECTION INTRAVENOUS at 06:02

## 2017-02-13 RX ADMIN — CEFAZOLIN SODIUM 2 G: 2 SOLUTION INTRAVENOUS at 01:02

## 2017-02-13 RX ADMIN — CEFAZOLIN SODIUM 2 G: 2 SOLUTION INTRAVENOUS at 08:02

## 2017-02-13 RX ADMIN — MORPHINE SULFATE 2 MG: 10 INJECTION INTRAVENOUS at 01:02

## 2017-02-13 RX ADMIN — CEFUROXIME 750 MG: 750 INJECTION, POWDER, FOR SOLUTION INTRAMUSCULAR; INTRAVENOUS at 08:02

## 2017-02-13 RX ADMIN — DOCUSATE SODIUM 100 MG: 100 CAPSULE, LIQUID FILLED ORAL at 09:02

## 2017-02-13 RX ADMIN — POTASSIUM CITRATE 15 MEQ: 5 TABLET ORAL at 09:02

## 2017-02-13 RX ADMIN — OXYCODONE HYDROCHLORIDE 10 MG: 5 TABLET ORAL at 12:02

## 2017-02-13 RX ADMIN — ACETAMINOPHEN 1000 MG: 10 INJECTION, SOLUTION INTRAVENOUS at 10:02

## 2017-02-13 RX ADMIN — GENTAMICIN SULFATE 480 MG: 40 INJECTION, SOLUTION INTRAMUSCULAR; INTRAVENOUS at 08:02

## 2017-02-13 RX ADMIN — BUPIVACAINE HYDROCHLORIDE 20 ML: 2.5 INJECTION, SOLUTION INFILTRATION; PERINEURAL at 08:02

## 2017-02-13 RX ADMIN — BUPIVACAINE 20 ML: 13.3 INJECTION, SUSPENSION, LIPOSOMAL INFILTRATION at 08:02

## 2017-02-13 RX ADMIN — MIDAZOLAM HYDROCHLORIDE 2 MG: 1 INJECTION, SOLUTION INTRAMUSCULAR; INTRAVENOUS at 07:02

## 2017-02-13 RX ADMIN — PROPOFOL 25 MCG/KG/MIN: 10 INJECTION, EMULSION INTRAVENOUS at 07:02

## 2017-02-13 RX ADMIN — MORPHINE SULFATE 4 MG: 10 INJECTION INTRAVENOUS at 09:02

## 2017-02-13 RX ADMIN — SODIUM CHLORIDE, SODIUM LACTATE, POTASSIUM CHLORIDE, AND CALCIUM CHLORIDE: .6; .31; .03; .02 INJECTION, SOLUTION INTRAVENOUS at 06:02

## 2017-02-13 RX ADMIN — MORPHINE SULFATE 2 MG: 10 INJECTION INTRAVENOUS at 04:02

## 2017-02-13 RX ADMIN — BUPIVACAINE HYDROCHLORIDE 3 ML: 5 INJECTION, SOLUTION EPIDURAL; INTRACAUDAL; PERINEURAL at 07:02

## 2017-02-13 RX ADMIN — CEFAZOLIN SODIUM 2 G: 2 SOLUTION INTRAVENOUS at 07:02

## 2017-02-13 RX ADMIN — PROMETHAZINE HYDROCHLORIDE 12.5 MG: 25 INJECTION, SOLUTION INTRAMUSCULAR; INTRAVENOUS at 08:02

## 2017-02-13 RX ADMIN — PRAVASTATIN SODIUM 40 MG: 40 TABLET ORAL at 12:02

## 2017-02-13 RX ADMIN — MORPHINE SULFATE 2 MG: 10 INJECTION INTRAVENOUS at 03:02

## 2017-02-13 RX ADMIN — MUPIROCIN 1 G: 20 OINTMENT TOPICAL at 02:02

## 2017-02-13 NOTE — PLAN OF CARE
02/13/17 1650   Discharge Assessment   Assessment Type Discharge Planning Assessment   Confirmed/corrected address and phone number on facesheet? Yes   Assessment information obtained from? Patient   Communicated expected length of stay with patient/caregiver no   Type of Healthcare Directive Received Durable power of  for health care   If Healthcare Directive is received, is it scanned into Epic? no (comment)   Prior to hospitilization cognitive status: Alert/Oriented   Prior to hospitalization functional status: Independent   Current cognitive status: Alert/Oriented   Current Functional Status: Assistive Equipment   Arrived From admitted as an inpatient;home or self-care   Lives With spouse   Able to Return to Prior Arrangements yes   Is patient able to care for self after discharge? Yes   How many people do you have in your home that can help with your care after discharge? 1   Who are your caregiver(s) and their phone number(s)? spouse, JOEL Coates,  [phone]   Patient's perception of discharge disposition home health  ([facility])   Readmission Within The Last 30 Days no previous admission in last 30 days   Patient currently being followed by outpatient case management? No   Patient currently receives home health services? No   Does the patient currently use HME? No   Patient currently receives private duty nursing? N/A   Patient currently receives any other outside agency services? No   Equipment Currently Used at Home none   Do you have any problems affording any of your prescribed medications? No   Is the patient taking medications as prescribed? yes   Do you have any financial concerns preventing you from receiving the healthcare you need? No   Does the patient have transportation to healthcare appointments? Yes   Transportation Available car;family or friend will provide   On Dialysis? No   Does the patient receive services at the Coumadin Clinic? No   Are there any open cases? No   Discharge  Plan A Home;Home Health   Discharge Plan B Home Health;Home   Patient/Family In Agreement With Plan yes   Kiersten Dave, CHON,ACM-SW,CCM

## 2017-02-13 NOTE — PROGRESS NOTES
Patient pain was not eased with prior medication. Order received for one time additional dose of morphine 2mg IVP given by Jina Perez RN. IV tylenol also given.   no discharge,

## 2017-02-13 NOTE — PROGRESS NOTES
Patient received from PACU via bed. He is awake and alert and states that his knee is starting to burn and he rates it a 7 at this time. LR infusing at 100ml/hr.  Pedal pulses + bilaterally, patient able to wiggle toes. Left knee is in the CPM machine with ace wrap dressing clean, dry and intact. Regular diet ordered. Patient oriented to room, call light and bathroom. Wife at bedside.

## 2017-02-13 NOTE — TRANSFER OF CARE
"Anesthesia Transfer of Care Note    Patient: Christophe Renteria    Procedure(s) Performed: Procedure(s) (LRB):  ARTHROPLASTY-KNEE (Left)    Patient location: PACU    Anesthesia Type: spinal and MAC    Transport from OR: Transported from OR on room air with adequate spontaneous ventilation    Post pain: adequate analgesia    Post assessment: no apparent anesthetic complications and tolerated procedure well    Post vital signs: stable    Level of consciousness: awake, alert and oriented    Nausea/Vomiting: no nausea/vomiting    Complications: none          Last vitals:   Visit Vitals    /64 (BP Location: Right arm, BP Method: Automatic)    Pulse 70    Temp 36.9 °C (98.4 °F) (Oral)    Resp 18    Ht 5' 7" (1.702 m)    Wt 85 kg (187 lb 6.3 oz)    SpO2 96%    BMI 29.35 kg/m2     "

## 2017-02-13 NOTE — PLAN OF CARE
Problem: Physical Therapy Goal  Goal: Physical Therapy Goal  Goals to be met by: 17     Patient will increase functional independence with mobility by performin. Sit to stand transfer with Modified [place]  2. Gait x 500 feet with Modified [place] using Rolling Walker  3. Ascend/descend 5 stair with bilateral Handrails Modified [place]   4. Lower extremity exercise program x30 reps per handout, with independence     Patient would benefit from PT while in the hospital in order to get back to PLOF.

## 2017-02-13 NOTE — OP NOTE
DATE OF PROCEDURE:  02/13/2017    SURGEON:  Ricky Sanders M.D.    ASSISTANT:  KARAN Orellana PA-C.    PREOPERATIVE DIAGNOSIS:  Osteoarthritis, left knee.    POSTOPERATIVE DIAGNOSIS:  Osteoarthritis, left knee.    PROCEDURES:  Left total knee replacement with DePuy sigma cruciate sacrificing   posterior-stabilized cemented prosthesis size 4 femur, size 4 tibia, size 32 mm   patellar button.  A 12.5 mm tibial insert.    ANESTHESIA:  Spinal.    COMPLICATIONS:  None.    BLOOD LOSS:  100 mL.    BLOOD GIVEN:  None.    PROCEDURE IN DETAIL:  Following obtaining proper informed consent from the   patient, he was taken to the Operating Room and given spinal anesthesia.  The   patient was given IV antibiotics and tranexamic acid.  Tourniquet applied to the   left thigh and inflated to 300 mmHg for 45 minutes during the case.  After   prepping and draping leg, an anterior midline incision was made.  The patient's   preoperative range of motion was 5 to 95 degrees.  The medial parapatellar   arthrotomy was performed.  The previous open medial meniscectomy incision was   used slightly medial to the normal incision.  Medial parapatellar arthrotomy was   performed.  The patient had a great deal of synovitis, partial synovectomy was   performed.  Osteophytes were removed.  Loose bodies were removed.  The patient   had an ACL deficient knee.  The patient had lateral subluxation of the tibia.    Retractors were placed around the proximal tibia once the patella was mobilized   and the extramedullary tibial guide was used to cut the tibia perpendicular to   the shaft of the tibia.  The sizer was placed and a size 4 was the best fit for   the tibia.    A drill bit was used to enter the intramedullary canal of the femur and a long   intramedullary guidewire was used with the distal cutting block set at 11 mm   resection and 5 degrees of valgus.  The distal cut was made.  Sizer was placed   in the femur, size 4 was best  fit for the femur.  A size 4 block was placed and   traction placed to protect the ligaments and remainder of the block cuts were   made.  A posterior-stabilized block was used to cut the box.  Trials were placed   and after ligament balancing, a 12.5 mm posterior-stabilized tibial insert   provided excellent stability and full range of motion.  The patella was very   arthritic and was resurfaced with 32 mm, all polyethylene, 3-hole button   patella.    All 3 components were cemented in place using third generation cement technique.    The wound was irrigated and tourniquet was released and hemostasis was   obtained using electrocautery.  The knee was retrialed and a 12.5 mm   posterior-stabilized insert was chosen.  The wound was irrigated again and   closed with #1 interrupted Ethibond suture to deep and #1 running Vicryl suture   deep and 2-0 Vicryl sutures subcutaneously with staples to the skin.  Sterile   dressing was applied, and the patient was taken to Recovery in stable condition.      YUMIKO/  dd: 02/13/2017 09:11:17 (CST)  td: 02/13/2017 09:58:08 (CST)  Doc ID   #[id number]  Job ID #[id number]    CC:

## 2017-02-13 NOTE — BRIEF OP NOTE
Operative Note         SUMMARY     Surgery Date: 2/13/2017     Surgeon(s) and Role:     * Ricky Sanders MD - Primary    Pre-op Diagnosis:  Degenerative joint disease of knee, left [M17.12]    Post-op Diagnosis:  same  Procedure(s) (LRB):  ARTHROPLASTY-KNEE (Left)    Anesthesia: Spinal    Findings/Key Components:        Specimen:    No specimen to Pathology orders placed during procedure(s).     Specimen     None            Estimated Blood Loss: 50 mL

## 2017-02-13 NOTE — IP AVS SNAPSHOT
[place] [address] [place] [address]  Phone: [phone]           Patient Discharge Instructions     Our goal is to set you up for success. This packet includes information on your condition, medications, and your home care. It will help you to care for yourself so you don't get sicker and need to go back to the hospital.     Please ask your nurse if you have any questions.        There are many details to remember when preparing to leave the hospital. Here is what you will need to do:    1. Take your medicine. If you are prescribed medications, review your Medication List in the following pages. You may have new medications to  at the pharmacy and others that you'll need to stop taking. Review the instructions for how and when to take your medications. Talk with your doctor or nurses if you are unsure of what to do.     2. Go to your follow-up appointments. Specific follow-up information is listed in the following pages. Your may be contacted by a transition nurse or clinical provider about future appointments. Be sure we have all of the phone numbers to reach you, if needed. Please contact your provider's office if you are unable to make an appointment.     3. Watch for warning signs. Your doctor or nurse will give you detailed warning signs to watch for and when to call for assistance. These instructions may also include educational information about your condition. If you experience any of warning signs to your health, call your doctor.               ** Verify the list of medication(s) below is accurate and up to date. Carry this with you in case of emergency. If your medications have changed, please notify your healthcare provider.             Medication List      START taking these medications        Additional Info                      aspirin 325 MG EC tablet   Commonly known as:  ECOTRIN   Refills:  0   Dose:  325 mg    Last time this was given:  325 mg on  2/15/2017  8:20 AM   Instructions:  Take 1 tablet (325 mg total) by mouth once daily.     Begin Date    AM    Noon    PM    Bedtime       bisacodyl 10 mg Supp   Commonly known as:  DULCOLAX   Refills:  0   Dose:  10 mg    Instructions:  Place 1 suppository (10 mg total) rectally daily as needed (Until bowel movement if patient has no bowel movement for 2 days).     Begin Date    AM    Noon    PM    Bedtime       famotidine 20 MG tablet   Commonly known as:  PEPCID   Quantity:  60 tablet   Refills:  0   Dose:  20 mg    Last time this was given:  20 mg on 2/15/2017  8:21 AM   Instructions:  Take 1 tablet (20 mg total) by mouth 2 (two) times daily.     Begin Date    AM    Noon    PM    Bedtime         CONTINUE taking these medications        Additional Info                      amlodipine 10 MG tablet   Commonly known as:  NORVASC   Quantity:  90 tablet   Refills:  3   Dose:  10 mg    Last time this was given:  10 mg on 2/14/2017  8:50 AM   Instructions:  Take 1 tablet (10 mg total) by mouth once daily.     Begin Date    AM    Noon    PM    Bedtime       bisoprolol 10 MG tablet   Commonly known as:  ZEBETA   Quantity:  30 tablet   Refills:  11   Dose:  10 mg    Last time this was given:  10 mg on 2/14/2017  8:51 AM   Instructions:  Take 1 tablet (10 mg total) by mouth once daily.     Begin Date    AM    Noon    PM    Bedtime       calcium citrate 200 mg (950 mg) tablet   Commonly known as:  CALCITRATE   Refills:  0   Dose:  1 tablet    Instructions:  Take 1 tablet by mouth once daily.     Begin Date    AM    Noon    PM    Bedtime       HUMIRA PEN SUBQ   Refills:  0   Dose:  40 mg    Instructions:  Inject 40 mg into the skin. EVERY 2 WEEKS     Begin Date    AM    Noon    PM    Bedtime       magnesium oxide 400 mg tablet   Commonly known as:  MAG-OX   Refills:  0   Dose:  400 mg    Last time this was given:  400 mg on 2/15/2017  8:21 AM   Instructions:  Take 400 mg by mouth once daily.     Begin Date    AM    Noon    PM     Bedtime       multivitamin capsule   Refills:  0   Dose:  1 capsule    Instructions:  Take 1 capsule by mouth once daily.     Begin Date    AM    Noon    PM    Bedtime       potassium citrate 15 mEq Tbsr   Refills:  0    Last time this was given:  15 mEq on 2/15/2017  8:22 AM     Begin Date    AM    Noon    PM    Bedtime       pravastatin 40 MG tablet   Commonly known as:  PRAVACHOL   Quantity:  90 tablet   Refills:  3   Dose:  40 mg    Last time this was given:  40 mg on 2/15/2017  8:21 AM   Instructions:  Take 1 tablet (40 mg total) by mouth once daily.     Begin Date    AM    Noon    PM    Bedtime            Where to Get Your Medications      Information about where to get these medications is not yet available     ! Ask your nurse or doctor about these medications     aspirin 325 MG EC tablet    bisacodyl 10 mg Supp    famotidine 20 MG tablet                  Please bring to all follow up appointments:    1. A copy of your discharge instructions.  2. All medicines you are currently taking in their original bottles.  3. Identification and insurance card.    Please arrive 15 minutes ahead of scheduled appointment time.    Please call 24 hours in advance if you must reschedule your appointment and/or time.        Follow-up Information     Follow up with Ricky Sanders MD. Schedule an appointment as soon as possible for a visit in 2 weeks.    Specialty:  Orthopedic Surgery    Contact information:    [address] [facility]  [place] [id number] [phone]          Follow up with [facility] [place].    Specialty:  Home Health Services    Why:  Home Health    Contact information:    [address]  [place] [address]  [phone]          Follow up with [facility].    Specialty:  DME Provider    Why:  DME-rolling walker and transfer tub bench     Contact information:    [address] [facility] [address]  [facility] [address]  [phone]        Referrals     Future Orders    Ambulatory Consult to Home  "Health         Discharge Instructions     Future Orders    TRANSFER TUB BENCH FOR HOME USE     Questions:    Type of Transfer Tub Bench:  Unpadded    Height:  5' 7" (1.702 m)    Weight:  85 kg (187 lb 6.3 oz)    Does patient have medical equipment at home?:  cane, straight    Length of need (1-99 months):  99    WALKER FOR HOME USE     Questions:    Type of Walker:  Adult (5'4"-6'6")    With wheels?:  Yes    Height:  5' 7" (1.702 m)    Weight:  85 kg (187 lb 6.3 oz)    Length of need (1-99 months):  99    Platform attachment:      Accessories/Other:      Assistance needed:      Does patient have medical equipment at home?:  cane, straight    Please check all that apply:  Patient's condition impairs ambulation.    Patient is unable to safely ambulate without equipment.    Walker will be used for gait training.    Patient needs help to get in and out of chair.        Primary Diagnosis     Your primary diagnosis was:  Osteoarthritis (Arthritis Due To Wear And Tear Of Joints)      Admission Information     Date & Time Provider Department CSN    2/13/2017  5:30 AM Ricky Sanders MD [facility] [id number]      Care Providers     Provider Role Specialty Primary office phone    Ricky Sanders MD Attending Provider Orthopedic Surgery [phone]    Ricky Sanders MD Surgeon  Orthopedic Surgery [phone]    Gabriel Holman MD Consulting Physician  Pulmonary Disease [phone]      Your Vitals Were     BP Pulse Temp Resp Height Weight    124/75 (BP Location: Left arm, Patient Position: Lying, BP Method: Automatic) 68 98.2 °F (36.8 °C) (Oral) 18 5' 7" (1.702 m) 85 kg (187 lb 6.3 oz)    SpO2 BMI             92% 29.35 kg/m2         Recent Lab Values        2/18/2016                           3:55 PM           A1C 5.2                       Allergies as of 2/15/2017     No Known Allergies      [facility]     [facility] Nurse Care Line - 24/7 Assistance  Unless otherwise directed " by your provider, please contact [facility], our nurse care line that is available for 24/7 assistance.     Registered nurses in the [facility] provide clinical advisement, health education, appointment booking, and other advisory services.  Call for this free service at [phone].        Advance Directives     An advance directive is a document which, in the event you are no longer able to make decisions for yourself, tells your healthcare team what kind of treatment you do or do not want to receive, or who you would like to make those decisions for you.  If you do not currently have an advance directive, [facility] encourages you to create one.  For more information call:  [phone]), [phone] ([phone]),  or log on to [email or web address].        Smoking Cessation     If you would like to quit smoking:   You may be eligible for free services if you are a [place] resident and started smoking cigarettes before September 1, 1988.  Call the Smoking Cessation Trust (SCT) toll free at (455) 815-4167 or (205) 598-1239.   Call 8-032-QUIT-NOW if you do not meet the above criteria.            Language Assistance Services     ATTENTION: Language assistance services are available, free of charge. Please call [phone].      ATENCIÓN: Si habla español, tiene a wilkerson disposición servicios gratuitos de asistencia lingüística. Llame al [phone].     CHÚ Ý: N?u b?n nói Ti?ng Vi?t, có các d?ch v? h? tr? ngôn ng? mi?n phí dành cho b?n. G?i s? [phone].        [facility] Sign-Up     Activating your [facility] account is as easy as 1-2-3!     1) Visit [email or web address], select Sign Up Now, enter this activation code and your date of birth, then select Next.  [email or web address]  Expires: 3/25/2017  9:14 AM      2) Create a username and password to use when you visit [facility] in the future and select a security question in case you lose your password and select Next.    3)  Enter your e-mail address and click Sign Up!    Additional Information  If you have questions, please e-mail [email or web address] or call [phone] to talk to our [facility] staff. Remember, [facility] is NOT to be used for urgent needs. For medical emergencies, dial 911.          [facility] complies with applicable Federal civil rights laws and does not discriminate on the basis of race, color, national origin, age, disability, or sex.

## 2017-02-13 NOTE — ANESTHESIA PREPROCEDURE EVALUATION
02/13/2017  Christophe Renteria is a 66 y.o., male.    [facility] Anesthesia Evaluation    I have reviewed the Patient Summary Reports.    I have reviewed the Nursing Notes.   I have reviewed the Medications.     Review of Systems  Anesthesia Hx:  History of prior surgery of interest to airway management or planning: Denies Family Hx of Anesthesia complications.   Denies Personal Hx of Anesthesia complications.   Social:  Non-Smoker, No Alcohol Use    Hematology/Oncology:  Hematology Normal   Oncology Normal     EENT/Dental:EENT/Dental Normal   Cardiovascular:   Exercise tolerance: good Hypertension, well controlled hyperlipidemia    Pulmonary:  Pulmonary Normal    Renal/:   Chronic Renal Disease, CRI Baseline cr 2.3   Hepatic/GI:  Hepatic/GI Normal    Musculoskeletal:   Arthritis     Neurological:  Neurology Normal    Endocrine:  Endocrine Normal    Dermatological:  Skin Normal    Psych:  Psychiatric Normal           Physical Exam  General:  Well nourished    Airway/Jaw/Neck:  Airway Findings: Mouth Opening: Normal General Airway Assessment: Adult  Mallampati: II  TM Distance: Normal, at least 6 cm         Dental:  DENTAL FINDINGS: Normal   Chest/Lungs:  Chest/Lungs Clear    Heart/Vascular:  Heart Findings: Normal Heart murmur: negative       Mental Status:  Mental Status Findings:  Cooperative, Alert and Oriented         Anesthesia Plan  Type of Anesthesia, risks & benefits discussed:  Anesthesia Type:  general  Patient's Preference:   Intra-op Monitoring Plan:   Intra-op Monitoring Plan Comments:   Post Op Pain Control Plan:   Post Op Pain Control Plan Comments:   Induction:   IV  Beta Blocker:  Patient is not currently on a Beta-Blocker (No further documentation required).       Informed Consent: Patient understands risks and agrees with Anesthesia plan.  Questions answered. Anesthesia consent signed with  patient.  ASA Score: 2     Day of Surgery Review of History & Physical:            Ready For Surgery From Anesthesia Perspective.

## 2017-02-13 NOTE — PROGRESS NOTES
Patient states pain is now a 9 and request IV pain medication. Morphine 2mg IV given. Patient has urinated x2 in urinal without difficulty.

## 2017-02-13 NOTE — PLAN OF CARE
Problem: Patient Care Overview  Goal: Plan of Care Review  Outcome: Ongoing (interventions implemented as appropriate)  Patient pain under control with IV pain medication. Ambulated with PT in larios with walker and tolerated well. Left knee in CPM from 430-630pm today. Ace wrap dressing to left knee clean and dry with polar ice in progress. Tolerating regular diet and voided in urinal without difficulty. Continues on LR at 100ml/hr.  Continues on IV tylenol and ancef.

## 2017-02-13 NOTE — PROGRESS NOTES
Physical Therapy   CPM placement     Christophe Renteria   MRN: [id number]     PT orders received for CPM placement s/p left TKA. Patient with no complaint of pain due to spinal. CPM placed to left knee and set at 0-80 degrees. CPM placed at 10:20AM and nurse notified to remove at 12:20PM. PT will continue to follow patient.     Marion Chaidez, PT

## 2017-02-13 NOTE — PT/OT/SLP EVAL
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    Hypertension     Lumbar disc disease     Psoriatic arthritis     Renal disease       Past Surgical History   Procedure Laterality Date    Fracture surgery      Rotator cuff repair Right     Ankle surgery Right     Back surgery      Joint replacement       Rt total hip     Referring physician: Tommy  Date referred to PT: 17    General Precautions: Standard, fall  Orthopedic Precautions: LLE weight bearing as tolerated   Braces:  (surgical dressing intact to left knee )       Patient History:  Lives With: spouse  Living Arrangements: house  Home Accessibility: stairs within home  Home Layout: Able to live on 1st floor  Living Environment Comment: Has a tub on first floor but a walk in shower on 2nd floor.   Equipment Currently Used at Home: cane, straight (does not currently use)    Previous Level of Function:  Ambulation Skills: independent  Transfer Skills: independent    Subjective:  Communicated with nurse Ray prior to session.  Patient agreeable to participate in PT session.   Chief Complaint: Pain to left knee.   Patient goals: To get back to PLOF.     Pain Ratin/10 (sitting on edge of bed)   Location - Side: Left  Location: knee  Pain Addressed: Pre-medicate for activity, Reposition, Cessation of Activity, Nurse notified  Pain Rating Post-Intervention: 4/10 (with ambulation )    Objective:   Patient found with: peripheral IV (SCD on R LE )     Cognitive Exam:  Oriented to: Person, Place, Time  and Situation    Follows Commands/attention: Follows multistep  commands  Communication: clear/fluent  Safety awareness/insight to disability: intact    Physical Exam:  Skin integrity: Visible skin intact with surgical dressing intact to left knee  Edema: None noted     Sensation:   Intact    Lower Extremity Range of Motion:  Right Lower Extremity: WFL  Left Lower Extremity: WFL except knee ~10 to 60 degrees limited by pain     Lower Extremity Strength:  Right Lower Extremity: WFL  Left Lower Extremity: WFL     Fine motor coordination:  Intact    Gross motor coordination: WFL    Functional Mobility:  Bed Mobility:  Supine to Sit: Stand by Assistance  Sit to Supine: Stand by Assistance    Transfers:  Sit <> Stand Assistance: Stand By Assistance (patient with loss of balance on 1st trial and sat himself back on the bed)  Sit <> Stand Assistive Device: Rolling Walker    Gait:   Gait Distance: ~80ft   Assistance 1: Stand by Assistance  Gait Assistive Device: Rolling walker  Gait Pattern: 3-point gait  Gait Deviation(s): decreased lorne, increased time in double stance, decreased velocity of limb motion, decreased step length    Balance:   Static Sit: NORMAL: No deviations seen in posture held statically  Dynamic Sit: GOOD+: Maintains balance through MAXIMAL excursions of active trunk motion  Static Stand: GOOD: Takes MODERATE challenges from all directions  Dynamic stand: FAIR+: Needs CLOSE SUPERVISION during gait and is able to right self with minor LOB    Therapeutic Activities and Exercises:  Patient educated in and performed L LE seated therex on edge of bed x10 reps for A/P, LAQ, knee flex, hip flex, and pillow squeezes. Patient tolerated well.     CPM placed to left knee and set at 0-70 degrees due to patient being in more pain this PM then when CPM was placed earlier. CPM placed at 4:30PM and nurse notified to remove at 6:30PM.     AM-PAC 6 CLICK MOBILITY  How much help from another person does this patient  currently need?   1 = Unable, Total/Dependent Assistance  2 = A lot, Maximum/Moderate Assistance  3 = A little, Minimum/Contact Guard/Supervision  4 = None, Modified [place]/Independent    Turning over in bed (including adjusting bedclothes, sheets and blankets)?: 4  Sitting down on and standing up from a chair with arms (e.g., wheelchair, bedside commode, etc.): 4  Moving from lying on back to sitting on the side of the bed?: 4  Moving to and from a bed to a chair (including a wheelchair)?: 4  Need to walk in hospital room?: 3  Climbing 3-5 steps with a railing?: 2  Total Score: 21     AM-PAC Raw Score CMS G-Code Modifier Level of Impairment Assistance   6 % Total / Unable   7 - 9 CM 80 - 100% Maximal Assist   10 - 14 CL 60 - 80% Moderate Assist   15 - 19 CK 40 - 60% Moderate Assist   20 - 22 CJ 20 - 40% Minimal Assist   23 CI 1-20% SBA / CGA   24 CH 0% Independent/ Mod I     Patient left supine with all lines intact, call button in reach, nurse notified and spouse present.    Assessment:   Christophe Renteria is a 66 y.o. male with a medical diagnosis of Degenerative joint disease of knee, left and presents with pain and decreased functional mobility due to L TKA. He would continue to benefit from PT while in the hospital in order to address deficits listed below and get patient back to PLOF.     Rehab identified problem list/impairments: Rehab identified problem list/impairments: impaired functional mobilty, gait instability, impaired balance, decreased lower extremity function, pain, decreased ROM, impaired skin, edema, orthopedic precautions    Rehab potential is good.    Activity tolerance: Good    Discharge recommendations: Discharge Facility/Level Of Care Needs: home health PT (with assistance from family as needed )     Barriers to discharge: Barriers to Discharge: None    Equipment recommendations: Equipment Needed After Discharge: walker, rolling, bath bench     GOALS:   Physical Therapy  Goals        Problem: Physical Therapy Goal    Goal Priority Disciplines Outcome Goal Variances Interventions   Physical Therapy Goal     PT/OT, PT      Description:  Goals to be met by: 17     Patient will increase functional independence with mobility by performin. Sit to stand transfer with Modified [place]  2. Gait  x 500 feet with Modified [place] using Rolling Walker  3. Ascend/descend 5 stair with bilateral Handrails Modified [place]   4. Lower extremity exercise program x30 reps per handout, with independence              PLAN:    Patient to be seen BID to address the above listed problems via gait training, therapeutic activities, therapeutic exercises  Plan of Care expires: 17  Plan of Care reviewed with: patient, spouse    Functional Assessment Tool Used: AM PAC   Score: 21  Functional Limitation: Mobility: Walking and moving around  Mobility: Walking and Moving Around Current Status (): CJ  Mobility: Walking and Moving Around Goal Status (): FABIANO Chaidez, PT, MOT  2017

## 2017-02-13 NOTE — H&P
No changes to H&P related to this admission.    Pt has had progressively worsening left knee pain which now limits the patients ability to walk or stand for extended periods. The pain is limiting stair climbing and interfering with normal activities of daily living.  The pain has been non-responsive to NSAID therapy. The patients functional level has not improved with an exercise program designed to strengthen the LE muscles and improve ambulation.  Radiographs show arthritis in the knee joint with significant loss of cartilage joint space and development of osteophytes and subchondral cysts.  The patient understands risks associated with joint replacement after all questions have been answered.

## 2017-02-13 NOTE — ANESTHESIA PROCEDURE NOTES
Spinal    Diagnosis: left knee pain  Patient location during procedure: pre-op  Start time: 2/13/2017 7:01 AM  Timeout: 2/13/2017 7:01 AM  End time: 2/13/2017 7:14 AM  Staffing  Anesthesiologist: SEGUN RAMOS  Performed by: anesthesiologist   Preanesthetic Checklist  Completed: patient identified, site marked, surgical consent, pre-op evaluation, timeout performed, IV checked, risks and benefits discussed and monitors and equipment checked  Spinal Block  Patient position: sitting  Prep: Betadine  Patient monitoring: heart rate, cardiac monitor and continuous pulse ox  Approach: left paramedian  Location: L4-5  Injection technique: single shot  CSF Fluid: clear free-flowing CSF  Needle  Needle type: pencil-tip   Needle gauge: 25 G  Needle length: 3.5 in  Additional Documentation: incremental injection and no paresthesia on injection  Needle localization: anatomical landmarks  Assessment  Ease of block: easy  Patient's tolerance of the procedure: comfortable throughout block  Medications:  Bolus administered: 3 mL of 0.5 bupivacaine

## 2017-02-14 LAB
ANION GAP SERPL CALC-SCNC: 8 MMOL/L
BASOPHILS # BLD AUTO: 0.01 K/UL
BASOPHILS NFR BLD: 0.1 %
BUN SERPL-MCNC: 36 MG/DL
CALCIUM SERPL-MCNC: 8.6 MG/DL
CHLORIDE SERPL-SCNC: 103 MMOL/L
CO2 SERPL-SCNC: 23 MMOL/L
CREAT SERPL-MCNC: 2.6 MG/DL
DIFFERENTIAL METHOD: ABNORMAL
EOSINOPHIL # BLD AUTO: 0 K/UL
EOSINOPHIL NFR BLD: 0 %
ERYTHROCYTE [DISTWIDTH] IN BLOOD BY AUTOMATED COUNT: 14.1 %
EST. GFR  (AFRICAN AMERICAN): 29 ML/MIN/1.73 M^2
EST. GFR  (NON AFRICAN AMERICAN): 25 ML/MIN/1.73 M^2
GLUCOSE SERPL-MCNC: 131 MG/DL
HCT VFR BLD AUTO: 35 %
HGB BLD-MCNC: 11.6 G/DL
LYMPHOCYTES # BLD AUTO: 1.5 K/UL
LYMPHOCYTES NFR BLD: 13.1 %
MCH RBC QN AUTO: 33 PG
MCHC RBC AUTO-ENTMCNC: 33.1 %
MCV RBC AUTO: 99 FL
MONOCYTES # BLD AUTO: 1.3 K/UL
MONOCYTES NFR BLD: 11.7 %
NEUTROPHILS # BLD AUTO: 8.4 K/UL
NEUTROPHILS NFR BLD: 75.1 %
PLATELET # BLD AUTO: 177 K/UL
PMV BLD AUTO: 10.1 FL
POTASSIUM SERPL-SCNC: 4.5 MMOL/L
RBC # BLD AUTO: 3.52 M/UL
SODIUM SERPL-SCNC: 134 MMOL/L
WBC # BLD AUTO: 11.14 K/UL

## 2017-02-14 PROCEDURE — G8987 SELF CARE CURRENT STATUS: HCPCS | Mod: CJ | Performed by: SPECIALIST

## 2017-02-14 PROCEDURE — 64448 NJX AA&/STRD FEM NRV NFS IMG: CPT | Mod: LT,,, | Performed by: ANESTHESIOLOGY

## 2017-02-14 PROCEDURE — 97535 SELF CARE MNGMENT TRAINING: CPT | Performed by: SPECIALIST

## 2017-02-14 PROCEDURE — 97530 THERAPEUTIC ACTIVITIES: CPT

## 2017-02-14 PROCEDURE — 11000001 HC ACUTE MED/SURG PRIVATE ROOM

## 2017-02-14 PROCEDURE — 97110 THERAPEUTIC EXERCISES: CPT

## 2017-02-14 PROCEDURE — 97165 OT EVAL LOW COMPLEX 30 MIN: CPT | Performed by: SPECIALIST

## 2017-02-14 PROCEDURE — 85025 COMPLETE CBC W/AUTO DIFF WBC: CPT

## 2017-02-14 PROCEDURE — 97116 GAIT TRAINING THERAPY: CPT

## 2017-02-14 PROCEDURE — 36415 COLL VENOUS BLD VENIPUNCTURE: CPT

## 2017-02-14 PROCEDURE — 25000003 PHARM REV CODE 250: Performed by: ANESTHESIOLOGY

## 2017-02-14 PROCEDURE — 80048 BASIC METABOLIC PNL TOTAL CA: CPT

## 2017-02-14 PROCEDURE — 64450 NJX AA&/STRD OTHER PN/BRANCH: CPT | Mod: 51,LT,, | Performed by: ANESTHESIOLOGY

## 2017-02-14 PROCEDURE — 63600175 PHARM REV CODE 636 W HCPCS: Performed by: ORTHOPAEDIC SURGERY

## 2017-02-14 PROCEDURE — 64450 NJX AA&/STRD OTHER PN/BRANCH: CPT | Performed by: ANESTHESIOLOGY

## 2017-02-14 PROCEDURE — 76942 ECHO GUIDE FOR BIOPSY: CPT | Mod: 26,,, | Performed by: ANESTHESIOLOGY

## 2017-02-14 PROCEDURE — 25000003 PHARM REV CODE 250: Performed by: ORTHOPAEDIC SURGERY

## 2017-02-14 PROCEDURE — G8988 SELF CARE GOAL STATUS: HCPCS | Mod: CH | Performed by: SPECIALIST

## 2017-02-14 RX ORDER — ROPIVACAINE HYDROCHLORIDE 2 MG/ML
8 INJECTION, SOLUTION EPIDURAL; INFILTRATION; PERINEURAL CONTINUOUS
Status: DISCONTINUED | OUTPATIENT
Start: 2017-02-14 | End: 2017-02-15 | Stop reason: HOSPADM

## 2017-02-14 RX ORDER — ACETAMINOPHEN 10 MG/ML
1000 INJECTION, SOLUTION INTRAVENOUS EVERY 8 HOURS
Status: DISPENSED | OUTPATIENT
Start: 2017-02-14 | End: 2017-02-15

## 2017-02-14 RX ORDER — LIDOCAINE HYDROCHLORIDE 20 MG/ML
INJECTION, SOLUTION INFILTRATION; PERINEURAL
Status: DISPENSED
Start: 2017-02-14 | End: 2017-02-14

## 2017-02-14 RX ORDER — ROPIVACAINE HYDROCHLORIDE 5 MG/ML
INJECTION, SOLUTION EPIDURAL; INFILTRATION; PERINEURAL
Status: DISPENSED
Start: 2017-02-14 | End: 2017-02-14

## 2017-02-14 RX ORDER — BUPIVACAINE HYDROCHLORIDE 5 MG/ML
INJECTION, SOLUTION EPIDURAL; INTRACAUDAL
Status: DISPENSED
Start: 2017-02-14 | End: 2017-02-15

## 2017-02-14 RX ORDER — HYDROCODONE BITARTRATE AND ACETAMINOPHEN 10; 325 MG/1; MG/1
1 TABLET ORAL EVERY 4 HOURS PRN
Status: DISCONTINUED | OUTPATIENT
Start: 2017-02-14 | End: 2017-02-15 | Stop reason: HOSPADM

## 2017-02-14 RX ORDER — PREGABALIN 50 MG/1
150 CAPSULE ORAL NIGHTLY
Status: DISCONTINUED | OUTPATIENT
Start: 2017-02-14 | End: 2017-02-15 | Stop reason: HOSPADM

## 2017-02-14 RX ORDER — ONDANSETRON 2 MG/ML
4 INJECTION INTRAMUSCULAR; INTRAVENOUS EVERY 12 HOURS PRN
Status: DISCONTINUED | OUTPATIENT
Start: 2017-02-14 | End: 2017-02-15 | Stop reason: HOSPADM

## 2017-02-14 RX ORDER — KETOROLAC TROMETHAMINE 30 MG/ML
15 INJECTION, SOLUTION INTRAMUSCULAR; INTRAVENOUS EVERY 6 HOURS
Status: DISCONTINUED | OUTPATIENT
Start: 2017-02-14 | End: 2017-02-14

## 2017-02-14 RX ORDER — ACETAMINOPHEN 500 MG
1000 TABLET ORAL DAILY
Status: DISCONTINUED | OUTPATIENT
Start: 2017-02-14 | End: 2017-02-15 | Stop reason: HOSPADM

## 2017-02-14 RX ADMIN — MORPHINE SULFATE 4 MG: 10 INJECTION INTRAVENOUS at 08:02

## 2017-02-14 RX ADMIN — HYDROCODONE BITARTRATE AND ACETAMINOPHEN 1 TABLET: 10; 325 TABLET ORAL at 06:02

## 2017-02-14 RX ADMIN — MORPHINE SULFATE 4 MG: 10 INJECTION INTRAVENOUS at 04:02

## 2017-02-14 RX ADMIN — TAMSULOSIN HYDROCHLORIDE 0.4 MG: 0.4 CAPSULE ORAL at 08:02

## 2017-02-14 RX ADMIN — CEFAZOLIN SODIUM 2 G: 2 SOLUTION INTRAVENOUS at 12:02

## 2017-02-14 RX ADMIN — ACETAMINOPHEN 1000 MG: 10 INJECTION, SOLUTION INTRAVENOUS at 09:02

## 2017-02-14 RX ADMIN — AMLODIPINE BESYLATE 10 MG: 5 TABLET ORAL at 08:02

## 2017-02-14 RX ADMIN — DOCUSATE SODIUM 100 MG: 100 CAPSULE, LIQUID FILLED ORAL at 09:02

## 2017-02-14 RX ADMIN — HYDROCODONE BITARTRATE AND ACETAMINOPHEN 1 TABLET: 10; 325 TABLET ORAL at 02:02

## 2017-02-14 RX ADMIN — DOCUSATE SODIUM 100 MG: 100 CAPSULE, LIQUID FILLED ORAL at 08:02

## 2017-02-14 RX ADMIN — MAGNESIUM OXIDE TAB 400 MG (241.3 MG ELEMENTAL MG) 400 MG: 400 (241.3 MG) TAB at 08:02

## 2017-02-14 RX ADMIN — PROMETHAZINE HYDROCHLORIDE 12.5 MG: 25 INJECTION, SOLUTION INTRAMUSCULAR; INTRAVENOUS at 09:02

## 2017-02-14 RX ADMIN — ASPIRIN 325 MG: 325 TABLET, DELAYED RELEASE ORAL at 08:02

## 2017-02-14 RX ADMIN — FAMOTIDINE 20 MG: 20 TABLET, FILM COATED ORAL at 08:02

## 2017-02-14 RX ADMIN — ACETAMINOPHEN 1000 MG: 10 INJECTION, SOLUTION INTRAVENOUS at 06:02

## 2017-02-14 RX ADMIN — ACETAMINOPHEN 1000 MG: 500 TABLET ORAL at 11:02

## 2017-02-14 RX ADMIN — PREGABALIN 150 MG: 50 CAPSULE ORAL at 09:02

## 2017-02-14 RX ADMIN — POTASSIUM CITRATE 15 MEQ: 5 TABLET ORAL at 08:02

## 2017-02-14 RX ADMIN — PRAVASTATIN SODIUM 40 MG: 40 TABLET ORAL at 08:02

## 2017-02-14 RX ADMIN — HYDROCODONE BITARTRATE AND ACETAMINOPHEN 1 TABLET: 10; 325 TABLET ORAL at 11:02

## 2017-02-14 RX ADMIN — POTASSIUM CITRATE 15 MEQ: 5 TABLET ORAL at 09:02

## 2017-02-14 RX ADMIN — SODIUM CHLORIDE, SODIUM LACTATE, POTASSIUM CHLORIDE, AND CALCIUM CHLORIDE: .6; .31; .03; .02 INJECTION, SOLUTION INTRAVENOUS at 08:02

## 2017-02-14 RX ADMIN — MUPIROCIN 1 G: 20 OINTMENT TOPICAL at 09:02

## 2017-02-14 RX ADMIN — MUPIROCIN 1 G: 20 OINTMENT TOPICAL at 08:02

## 2017-02-14 RX ADMIN — PROMETHAZINE HYDROCHLORIDE 12.5 MG: 25 INJECTION, SOLUTION INTRAMUSCULAR; INTRAVENOUS at 06:02

## 2017-02-14 RX ADMIN — SODIUM CHLORIDE, SODIUM LACTATE, POTASSIUM CHLORIDE, AND CALCIUM CHLORIDE: .6; .31; .03; .02 INJECTION, SOLUTION INTRAVENOUS at 09:02

## 2017-02-14 RX ADMIN — BISOPROLOL FUMARATE 10 MG: 5 TABLET, COATED ORAL at 08:02

## 2017-02-14 NOTE — PLAN OF CARE
Problem: Occupational Therapy Goal  Goal: Occupational Therapy Goal  Goals to be met by: 02/21/17     Patient will increase functional independence with ADLs by performing:    UE Dressing with Modified [place].  Grooming while standing at sink with Modified [place].  Toileting from toilet with Modified [place] for hygiene and clothing management.   Toilet transfer to toilet with Modified [place].   Outcome: Ongoing (interventions implemented as appropriate)  OT evaluation complete.  Pt will benefit from RW and TTB.

## 2017-02-14 NOTE — PROGRESS NOTES
WRITTEN HEALTHCARE DISCHARGE INFORMATION     If you are unable to make your follow up appointments, please call the number listed and reschedule this appointment.     After discharge, if you need assistance, you can call [facility] for 24/7 assistance at [phone]    Thank you for choosing [facility] and allowing us to care for you.   From your care management team: Jeff [phone] or [phone]    You should receive a call from [facility] within 48-72 hours to help manage your care after discharge. Please try to make sure that you answer your phone for this important phone call.     Follow-up Information     Follow up with Ricky Sanders MD. Schedule an appointment as soon as possible for a visit in 2 weeks.    Specialty:  Orthopedic Surgery    Contact information:    [address] [facility]  [place] [address]  [phone]          Follow up with [facility] [place].    Specialty:  Home Health Services    Why:  Home Health    Contact information:    [address]  [facility] [address]  [phone]          Follow up with [facility].    Specialty:  DME Provider    Why:  DME-rolling walker and transfer tub bench     Contact information:    [address] [facility] [address]  [facility] [address] [phone]

## 2017-02-14 NOTE — OR NURSING
Pt arrives in PACU for neural axial block for pain control, pt awake, alert. Reports strong pain in lt knee

## 2017-02-14 NOTE — PROGRESS NOTES
"Attempted to meet with patient to discuss discharge plan for home health. Patient currently, off unit. TN was able to speak with patients wife, Mrs. Lakhani. She states that patient is possibly going to discharge later today and wanted home health with [facility]. TN informed her that therapy had recommended transfer tub bench and rolling walker. She states that she would like TN to order both and would like to have the rolling walker delivered to the hospital and the transfer tub bench to the home. TN informed her that we usually schedule follow up appointment. She states that TN doesn't have to schedule appointment he will just pass by the office one day to have knee checked.     All paperwork (facesheet, H&P, progress notes, labs, MAR, orders) sent to [facility] via [facility]. Fax confirmation received.     Message received via [employer] accepting patient. All information added to "follow up" tab.     Order for rolling walker and transfer tub bench sent to Leonarda with [facility].     Call received back from Leonarda with [facility]. She states that she did receive the order and will have the DME delivered today.   "

## 2017-02-14 NOTE — PT/OT/SLP PROGRESS
Physical Therapy  Treatment    Christophe Renteria   MRN: [id number]   Admitting Diagnosis: Degenerative joint disease of knee, left    PT Received On: 02/14/17  PT Start Time: 1436     PT Stop Time: 1510    PT Total Time (min): 34 min       Billable Minutes:  Gait Training 13, Therapeutic Activity 10 and Therapeutic Exercise 11    Treatment Type: Treatment  PT/PTA: PT     PTA Visit Number: 0       General Precautions: Standard, fall  Orthopedic Precautions: LLE weight bearing as tolerated   Braces: N/A    Subjective:  Communicated with nurse Ray prior to session.  Patient agreeable to participate in PT session.    Pain Rating:  (patient stated that pain was better since receiving the block )  Location - Side: Left  Location: knee  Pain Addressed: Pre-medicate for activity, Reposition, Cessation of Activity    Objective:   Patient found with: Polar ice, peripheral IV    Functional Mobility:  Bed Mobility:   Supine to Sit: Supervision  Sit to Supine: Supervision    Transfers:  Sit <> Stand Assistance: Supervision  Sit <> Stand Assistive Device: Rolling Walker    Gait:   Gait Distance: ~400ft   Assistance 1: Supervision  Gait Assistive Device: Rolling walker  Gait Pattern: 3-point gait  Gait Deviation(s): decreased lorne, increased time in double stance, decreased velocity of limb motion, decreased step length    Balance:   Static Sit: NORMAL: No deviations seen in posture held statically  Dynamic Sit: NORMAL: No deviations seen in posture held dynamically  Static Stand: GOOD+: Takes MAXIMAL challenges from all directions  Dynamic stand: GOOD: Needs SUPERVISION only during gait and able to self right with moderate      Therapeutic Activities and Exercises:  Patient performed L LE seated therex while on edge of bed x15 reps for A/P, LAQ, heel slides, and hip flexion. Patient demonstrated increased ROM to left knee this PM due to less complaint of pain.    CPM placed to left knee at 3:00PM and nurse notified to remove  at 5:00PM.     AM-PAC 6 CLICK MOBILITY  How much help from another person does this patient currently need?   1 = Unable, Total/Dependent Assistance  2 = A lot, Maximum/Moderate Assistance  3 = A little, Minimum/Contact Guard/Supervision  4 = None, Modified [place]/Independent    Turning over in bed (including adjusting bedclothes, sheets and blankets)?: 4  Sitting down on and standing up from a chair with arms (e.g., wheelchair, bedside commode, etc.): 4  Moving from lying on back to sitting on the side of the bed?: 4  Moving to and from a bed to a chair (including a wheelchair)?: 4  Need to walk in hospital room?: 3  Climbing 3-5 steps with a railing?: 3  Total Score: 22    AM-PAC Raw Score CMS G-Code Modifier Level of Impairment Assistance   6 % Total / Unable   7 - 9 CM 80 - 100% Maximal Assist   10 - 14 CL 60 - 80% Moderate Assist   15 - 19 CK 40 - 60% Moderate Assist   20 - 22 CJ 20 - 40% Minimal Assist   23 CI 1-20% SBA / CGA   24 CH 0% Independent/ Mod I     Patient left supine with all lines intact, call button in reach, nurse notified and spouse present.    Assessment:  Christophe Renteria is a 66 y.o. male with a medical diagnosis of Degenerative joint disease of knee, left and presents with decreased ROM and pain due to left TKA. He had less complaint of pain this PM and was able to increase gait distance. He would continue to benefit from PT while in the hospital in order to address deficits listed below and get patient back to PLOF.     Rehab identified problem list/impairments: Rehab identified problem list/impairments: weakness, impaired functional mobilty, gait instability, impaired balance, decreased lower extremity function, pain, impaired skin, edema, decreased ROM, orthopedic precautions    Rehab potential is good.    Activity tolerance: Good    Discharge recommendations: Discharge Facility/Level Of Care Needs: home health PT (with family assistance as needed)     Barriers to  discharge: Barriers to Discharge: None    Equipment recommendations: Equipment Needed After Discharge: bath bench, walker, rolling     GOALS:   Physical Therapy Goals        Problem: Physical Therapy Goal    Goal Priority Disciplines Outcome Goal Variances Interventions   Physical Therapy Goal     PT/OT, PT      Description:  Goals to be met by: 17     Patient will increase functional independence with mobility by performin. Sit to stand transfer with Modified [place]  2. Gait  x 500 feet with Modified [place] using Rolling Walker  3. Ascend/descend 5 stair with bilateral Handrails Modified [place]   4. Lower extremity exercise program x30 reps per handout, with independence              PLAN:    Patient to be seen BID  to address the above listed problems via gait training, therapeutic activities, therapeutic exercises  Plan of Care expires: 17  Plan of Care reviewed with: patient, spouse    Marion Chaidez, PT, MOT  2017

## 2017-02-14 NOTE — PT/OT/SLP PROGRESS
Physical Therapy  Treatment    Christophe Renteria   MRN: [id number]   Admitting Diagnosis: Degenerative joint disease of knee, left    PT Received On: 17  PT Start Time: 1057     PT Stop Time: 1125    PT Total Time (min): 28 min       Billable Minutes:  Gait Training 14 and Therapeutic Exercise 14    Treatment Type: Treatment  PT/PTA: PT     PTA Visit Number: 0       General Precautions: Standard, fall  Orthopedic Precautions: LLE weight bearing as tolerated   Braces: N/A         Subjective:  Communicated with nurse Ray prior to session.  Patient agreeable to participate in PT session.     Pain Ratin/10 (with ambulation )  Location - Side: Left  Location: knee  Pain Addressed: Pre-medicate for activity, Reposition, Cessation of Activity    Objective:   Patient found with: Polar ice, peripheral IV    Functional Mobility:  Bed Mobility:   Sit to Supine: Supervision    Transfers:  Sit <> Stand Assistance: Supervision (x2 from bedside chair )  Sit <> Stand Assistive Device: Rolling Walker    Gait:   Gait Distance: ~160ft   Assistance 1: Supervision  Gait Assistive Device: Rolling walker  Gait Pattern: 3-point gait  Gait Deviation(s): decreased lorne, increased time in double stance, decreased velocity of limb motion, decreased step length    Balance:   Static Sit: NORMAL: No deviations seen in posture held statically  Dynamic Sit: GOOD+: Maintains balance through MAXIMAL excursions of active trunk motion  Static Stand: GOOD: Takes MODERATE challenges from all directions  Dynamic stand: GOOD-: Needs SUPERVISION only during gait and able to self right with moderate      Therapeutic Activities and Exercises:  Patient going downstairs to get a adductor block. Nurse informed to call PT when patient is back so CPM can be applied.     Patient performed B A/P and left quad set x10 reps.     AM-PAC 6 CLICK MOBILITY  How much help from another person does this patient currently need?   1 = Unable, Total/Dependent  Assistance  2 = A lot, Maximum/Moderate Assistance  3 = A little, Minimum/Contact Guard/Supervision  4 = None, Modified [place]/Independent    Turning over in bed (including adjusting bedclothes, sheets and blankets)?: 4  Sitting down on and standing up from a chair with arms (e.g., wheelchair, bedside commode, etc.): 4  Moving from lying on back to sitting on the side of the bed?: 4  Moving to and from a bed to a chair (including a wheelchair)?: 4  Need to walk in hospital room?: 3  Climbing 3-5 steps with a railing?: 2  Total Score: 21    AM-PAC Raw Score CMS G-Code Modifier Level of Impairment Assistance   6 % Total / Unable   7 - 9 CM 80 - 100% Maximal Assist   10 - 14 CL 60 - 80% Moderate Assist   15 - 19 CK 40 - 60% Moderate Assist   20 - 22 CJ 20 - 40% Minimal Assist   23 CI 1-20% SBA / CGA   24 CH 0% Independent/ Mod I     Patient left supine with all lines intact, call button in reach, spouse present, and nurse notified.    Assessment:  Christophe Renteria is a 66 y.o. male with a medical diagnosis of Degenerative joint disease of knee, left and presents with pain and decreased ROM to left knee due to TKA. He increased gait distance but continues to have increased pain. He would continue to benefit from PT while in the hospital in order to address deficits listed below and get patient back to PLOF.     Rehab identified problem list/impairments: Rehab identified problem list/impairments: weakness, impaired functional mobilty, gait instability, impaired balance, decreased lower extremity function, decreased ROM, impaired skin, pain, orthopedic precautions, edema    Rehab potential is good.    Activity tolerance: Good    Discharge recommendations: Discharge Facility/Level Of Care Needs: home health PT (with family assistance as needed)     Barriers to discharge: Barriers to Discharge: None    Equipment recommendations: Equipment Needed After Discharge: bath bench, walker, rolling     GOALS:    Physical Therapy Goals        Problem: Physical Therapy Goal    Goal Priority Disciplines Outcome Goal Variances Interventions   Physical Therapy Goal     PT/OT, PT      Description:  Goals to be met by: 17     Patient will increase functional independence with mobility by performin. Sit to stand transfer with Modified [place]  2. Gait  x 500 feet with Modified [place] using Rolling Walker  3. Ascend/descend 5 stair with bilateral Handrails Modified [place]   4. Lower extremity exercise program x30 reps per handout, with independence              PLAN:    Patient to be seen BID  to address the above listed problems via gait training, therapeutic activities, therapeutic exercises  Plan of Care expires: 17  Plan of Care reviewed with: patient, spouse    Marion Chaidez, PT, MOT  2017

## 2017-02-14 NOTE — PROGRESS NOTES
Patient returned from PACU. He is awake and alert and states pain in knee is 5. Polar ice reapplied. Lunch heated for patient.

## 2017-02-14 NOTE — PROGRESS NOTES
Progress Note  Orthopedics    Admit Date: 2/13/2017  Post-operative Day: 1 Day Post-Op  Hospital Day: 2    Follow-up For: Procedure(s) (LRB):  ARTHROPLASTY-KNEE (Left)    SUBJECTIVE:     Christophe Renteria is 66 y.o. and is now 1 day post surgery. Pain is not well controlled.. He  He is ambulating.  Weight Bearing: WBAT    Request adductor block    OBJECTIVE:     Vital Signs (Most Recent)  Temp: 98.2 °F (36.8 °C) (02/14/17 1215)  Pulse: 64 (02/14/17 1215)  Resp: 20 (02/14/17 1215)  BP: 121/78 (02/14/17 1215)  SpO2: (!) 90 % (02/14/17 1215)      Physical Exam:  Dressing: clean, dry and intact  Incision: healing well, no significant drainage  DVT Evaluation: No evidence of DVT seen on physical exam.  Negative López's sign.  Neurovascular: 5/5 motor strength, sensation intact, palpable pulses    Laboratory:  Recent Results (from the past 24 hour(s))   CBC auto differential    Collection Time: 02/14/17  4:32 AM   Result Value Ref Range    WBC 11.14 3.90 - 12.70 K/uL    RBC 3.52 (L) 4.60 - 6.20 M/uL    Hemoglobin 11.6 (L) 14.0 - 18.0 g/dL    Hematocrit 35.0 (L) 40.0 - 54.0 %    MCV 99 (H) 82 - 98 fL    MCH 33.0 (H) 27.0 - 31.0 pg    MCHC 33.1 32.0 - 36.0 %    RDW 14.1 11.5 - 14.5 %    Platelets 177 150 - 350 K/uL    MPV 10.1 9.2 - 12.9 fL    Gran # 8.4 (H) 1.8 - 7.7 K/uL    Lymph # 1.5 1.0 - 4.8 K/uL    Mono # 1.3 (H) 0.3 - 1.0 K/uL    Eos # 0.0 0.0 - 0.5 K/uL    Baso # 0.01 0.00 - 0.20 K/uL    Gran% 75.1 (H) 38.0 - 73.0 %    Lymph% 13.1 (L) 18.0 - 48.0 %    Mono% 11.7 4.0 - 15.0 %    Eosinophil% 0.0 0.0 - 8.0 %    Basophil% 0.1 0.0 - 1.9 %    Differential Method Automated    Basic metabolic panel    Collection Time: 02/14/17  4:32 AM   Result Value Ref Range    Sodium 134 (L) 136 - 145 mmol/L    Potassium 4.5 3.5 - 5.1 mmol/L    Chloride 103 95 - 110 mmol/L    CO2 23 23 - 29 mmol/L    Glucose 131 (H) 70 - 110 mg/dL    BUN, Bld 36 (H) 8 - 23 mg/dL    Creatinine 2.6 (H) 0.5 - 1.4 mg/dL    Calcium 8.6 (L) 8.7 - 10.5 mg/dL     Anion Gap 8 8 - 16 mmol/L    eGFR if African American 29 (A) >60 mL/min/1.73 m^2    eGFR if non African American 25 (A) >60 mL/min/1.73 m^2         ASSESSMENT/PLAN:     Assessment: orthopedic stable  Status Post: L TKA    Plan: adductor block  Cont PT and DVT prophylaxis

## 2017-02-14 NOTE — PT/OT/SLP EVAL
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    Hypertension     Lumbar disc disease     Psoriatic arthritis     Renal disease       Past Surgical History   Procedure Laterality Date    Fracture surgery      Rotator cuff repair Right     Ankle surgery Right     Back surgery      Joint replacement       Rt total hip       Referring physician: Tommy  Date referred to OT: 02/13/17    General Precautions: Standard, fall  Orthopedic Precautions: LUE weight bearing as tolerated, Full weight bearing  Braces: N/A          Patient History:  Living Environment  Lives With: spouse  Living Arrangements: house  Home Accessibility: stairs within home  Home Layout: Able to live on 1st floor  Number of Stairs Within Home:  (1 flight)  Stair Railings at Home: inside, present on left side  Living Environment Comment: Pt was Ind with ADLs and lives with spouse.    Equipment Currently Used at Home: cane, straight    Prior level of function:   Bed Mobility/Transfers: independent  Grooming: independent  Bathing: independent  Upper Body Dressing: independent  Lower Body Dressing: independent  Toileting: independent  Home Management Skills: independent        Dominant hand: right    Subjective:  Communicated with RN (Flor) prior to session.    Chief Complaint: Pain  Patient/Family stated goals: To go home    Pain Rating:  (Facial grimaces with movement)  Location - Side: Left     Location: knee  Pain Addressed: Pre-medicate for activity, Reposition, Cessation of Activity       Objective:  Patient  found with: Polar ice, peripheral IV, SCD    Cognitive Exam:  Oriented to: Person, Place, Time and Situation  Follows Commands/attention: Follows multistep  commands  Communication: clear/fluent  Memory:  No Deficits noted  Safety awareness/insight to disability: intact  Coping skills/emotional control: Appropriate to situation    Visual/perceptual:  Intact    Physical Exam:  Postural examination/scapula alignment: No postural abnormalities identified      Sensation:   Intact    Upper Extremity Range of Motion:  Right Upper Extremity: WFL  Left Upper Extremity: WFL    Upper Extremity Strength:  Right Upper Extremity: WFL  Left Upper Extremity: WFL      Fine motor coordination:   Intact    Gross motor coordination: WFL    Functional Mobility:  Bed Mobility:  Supine to Sit: Supervision    Transfers:  Sit <> Stand Assistance: Supervision  Sit <> Stand Assistive Device: Rolling Walker  Bed <> Chair Transfer Assistance: Supervision  Bed <> Chair Assistive Device: Rolling Walker  Toilet Transfer Assistance: Supervision  Toilet Transfer Assistive Device: Rolling Walker    Functional Ambulation: Pt ambulated bed > toilet > sink > bedside chair    Activities of Daily Living:  Feeding Level of Assistance: Activity did not occur    UE Dressing Level of Assistance: Moderate assistance (cheryl gown as robe; IV line)    LE Dressing Level of Assistance: Set-up Assistance    Grooming Position: Standing at sink  Grooming Level of Assistance: Supervision     Toileting Level of Assistance: Activity did not occur     Bathing Level of Assistance: Activity did not occur      Balance:   Static Sit: GOOD+: Takes MAXIMAL challenges from all directions.    Dynamic Sit: GOOD+: Maintains balance through MAXIMAL excursions of active trunk motion  Static Stand: FAIR+: Takes MINIMAL challenges from all directions  Dynamic stand: GOOD-: Needs SUPERVISION only during gait and able to self right with moderate         AM-PAC 6 CLICK ADL  How much help  "from another person does this patient currently need?  1 = Unable, Total/Dependent Assistance  2 = A lot, Maximum/Moderate Assistance  3 = A little, Minimum/Contact Guard/Supervision  4 = None, Modified [place]/Independent    Putting on and taking off regular lower body clothing? : 4  Bathing (including washing, rinsing, drying)?: 3  Toileting, which includes using toilet, bedpan, or urinal? : 3  Putting on and taking off regular upper body clothing?: 3  Taking care of personal grooming such as brushing teeth?: 4  Eating meals?: 4  Total Score: 21    AM-PAC Raw Score CMS "G-Code Modifier Level of Impairment Assistance   6 % Total / Unable   7 - 9 CM 80 - 100% Maximal Assist   10 - 14 CL 60 - 80% Moderate Assist   15 - 19 CK 40 - 60% Moderate Assist   20 - 22 CJ 20 - 40% Minimal Assist   23 CI 1-20% SBA / CGA   24 CH 0% Independent/ Mod I       Patient left up in chair with all lines intact, call button in reach, RN (Flor) notified and spouse present    Assessment:  Christophe Renteria is a 66 y.o. male with a medical diagnosis of Degenerative joint disease of knee, left and presents with mild deficits in self care limiting I with ADLs.  Pt will benefit from skilled OT services to increase I with ADLs..    Rehab identified problem list/impairments: Rehab identified problem list/impairments: weakness, impaired endurance, impaired self care skills, impaired functional mobilty, impaired balance, decreased lower extremity function, decreased ROM, pain, orthopedic precautions    Rehab potential is good.    Activity tolerance: Good    Discharge recommendations: Discharge Facility/Level Of Care Needs: home     Barriers to discharge: Barriers to Discharge: None    Equipment recommendations: bath bench, walker, rolling     GOALS:   Occupational Therapy Goals        Problem: Occupational Therapy Goal    Goal Priority Disciplines Outcome Interventions   Occupational Therapy Goal     OT, PT/OT     Description:  " Goals to be met by: 02/21/17     Patient will increase functional independence with ADLs by performing:    UE Dressing with Modified [place].  Grooming while standing at sink with Modified [place].  Toileting from toilet with Modified [place] for hygiene and clothing management.   Toilet transfer to toilet with Modified [place].                PLAN:  Patient to be seen 5 x/week to address the above listed problems via self-care/home management, therapeutic activities  Plan of Care expires: 02/21/17  Plan of Care reviewed with: patient    OT G-codes  Functional Assessment Tool Used: AM-PAC  Score: 21  Functional Limitation: Self care  Self Care Current Status (): CJ  Self Care Goal Status (): TEDDY Bethea OT  02/14/2017

## 2017-02-14 NOTE — PLAN OF CARE
Problem: Pain, Acute (Adult)  Goal: Identify Related Risk Factors and Signs and Symptoms  Related risk factors and signs and symptoms are identified upon initiation of Human Response Clinical Practice Guideline (CPG)   Outcome: Ongoing (interventions implemented as appropriate)    02/14/17 0600   Pain, Acute   Related Risk Factors (Acute Pain) surgery   Signs and Symptoms (Acute Pain) verbalization of pain descriptors      Pt medicated per MD orders.

## 2017-02-15 VITALS
DIASTOLIC BLOOD PRESSURE: 75 MMHG | HEIGHT: 67 IN | SYSTOLIC BLOOD PRESSURE: 124 MMHG | RESPIRATION RATE: 18 BRPM | TEMPERATURE: 98 F | HEART RATE: 68 BPM | WEIGHT: 187.38 LBS | OXYGEN SATURATION: 92 % | BODY MASS INDEX: 29.41 KG/M2

## 2017-02-15 LAB
ANION GAP SERPL CALC-SCNC: 9 MMOL/L
BASOPHILS # BLD AUTO: 0.01 K/UL
BASOPHILS NFR BLD: 0.1 %
BUN SERPL-MCNC: 34 MG/DL
CALCIUM SERPL-MCNC: 8.4 MG/DL
CHLORIDE SERPL-SCNC: 105 MMOL/L
CO2 SERPL-SCNC: 23 MMOL/L
CREAT SERPL-MCNC: 2.4 MG/DL
DIFFERENTIAL METHOD: ABNORMAL
EOSINOPHIL # BLD AUTO: 0 K/UL
EOSINOPHIL NFR BLD: 0.3 %
ERYTHROCYTE [DISTWIDTH] IN BLOOD BY AUTOMATED COUNT: 14.2 %
EST. GFR  (AFRICAN AMERICAN): 32 ML/MIN/1.73 M^2
EST. GFR  (NON AFRICAN AMERICAN): 27 ML/MIN/1.73 M^2
GLUCOSE SERPL-MCNC: 125 MG/DL
HCT VFR BLD AUTO: 31 %
HGB BLD-MCNC: 10.1 G/DL
LYMPHOCYTES # BLD AUTO: 1.3 K/UL
LYMPHOCYTES NFR BLD: 14.6 %
MCH RBC QN AUTO: 33.1 PG
MCHC RBC AUTO-ENTMCNC: 32.6 %
MCV RBC AUTO: 102 FL
MONOCYTES # BLD AUTO: 1.5 K/UL
MONOCYTES NFR BLD: 17.6 %
NEUTROPHILS # BLD AUTO: 5.8 K/UL
NEUTROPHILS NFR BLD: 67.4 %
PLATELET # BLD AUTO: 154 K/UL
PMV BLD AUTO: 10.9 FL
POTASSIUM SERPL-SCNC: 4.1 MMOL/L
RBC # BLD AUTO: 3.05 M/UL
SODIUM SERPL-SCNC: 137 MMOL/L
WBC # BLD AUTO: 8.67 K/UL

## 2017-02-15 PROCEDURE — 25000242 PHARM REV CODE 250 ALT 637 W/ HCPCS: Performed by: ORTHOPAEDIC SURGERY

## 2017-02-15 PROCEDURE — 63600175 PHARM REV CODE 636 W HCPCS: Performed by: ORTHOPAEDIC SURGERY

## 2017-02-15 PROCEDURE — 97530 THERAPEUTIC ACTIVITIES: CPT

## 2017-02-15 PROCEDURE — 97110 THERAPEUTIC EXERCISES: CPT

## 2017-02-15 PROCEDURE — 97535 SELF CARE MNGMENT TRAINING: CPT

## 2017-02-15 PROCEDURE — 25000003 PHARM REV CODE 250: Performed by: ORTHOPAEDIC SURGERY

## 2017-02-15 PROCEDURE — G8979 MOBILITY GOAL STATUS: HCPCS | Mod: CI

## 2017-02-15 PROCEDURE — 94640 AIRWAY INHALATION TREATMENT: CPT

## 2017-02-15 PROCEDURE — 36415 COLL VENOUS BLD VENIPUNCTURE: CPT

## 2017-02-15 PROCEDURE — 97116 GAIT TRAINING THERAPY: CPT

## 2017-02-15 PROCEDURE — G8978 MOBILITY CURRENT STATUS: HCPCS | Mod: CI

## 2017-02-15 PROCEDURE — G8980 MOBILITY D/C STATUS: HCPCS | Mod: CI

## 2017-02-15 PROCEDURE — 85025 COMPLETE CBC W/AUTO DIFF WBC: CPT

## 2017-02-15 PROCEDURE — 80048 BASIC METABOLIC PNL TOTAL CA: CPT

## 2017-02-15 RX ORDER — ALBUTEROL SULFATE 2.5 MG/.5ML
2.5 SOLUTION RESPIRATORY (INHALATION) EVERY 4 HOURS PRN
Status: DISCONTINUED | OUTPATIENT
Start: 2017-02-15 | End: 2017-02-15 | Stop reason: HOSPADM

## 2017-02-15 RX ORDER — ASPIRIN 325 MG
325 TABLET, DELAYED RELEASE (ENTERIC COATED) ORAL DAILY
Refills: 0
Start: 2017-02-15 | End: 2017-05-19

## 2017-02-15 RX ORDER — FAMOTIDINE 20 MG/1
20 TABLET, FILM COATED ORAL 2 TIMES DAILY
Qty: 60 TABLET | Refills: 0
Start: 2017-02-15 | End: 2017-05-19

## 2017-02-15 RX ORDER — BISACODYL 10 MG
10 SUPPOSITORY, RECTAL RECTAL DAILY PRN
Refills: 0
Start: 2017-02-15 | End: 2017-05-19

## 2017-02-15 RX ADMIN — HYDROCODONE BITARTRATE AND ACETAMINOPHEN 1 TABLET: 10; 325 TABLET ORAL at 08:02

## 2017-02-15 RX ADMIN — TAMSULOSIN HYDROCHLORIDE 0.4 MG: 0.4 CAPSULE ORAL at 08:02

## 2017-02-15 RX ADMIN — POTASSIUM CITRATE 15 MEQ: 5 TABLET ORAL at 08:02

## 2017-02-15 RX ADMIN — ACETAMINOPHEN 1000 MG: 10 INJECTION, SOLUTION INTRAVENOUS at 06:02

## 2017-02-15 RX ADMIN — ASPIRIN 325 MG: 325 TABLET, DELAYED RELEASE ORAL at 08:02

## 2017-02-15 RX ADMIN — MAGNESIUM OXIDE TAB 400 MG (241.3 MG ELEMENTAL MG) 400 MG: 400 (241.3 MG) TAB at 08:02

## 2017-02-15 RX ADMIN — PRAVASTATIN SODIUM 40 MG: 40 TABLET ORAL at 08:02

## 2017-02-15 RX ADMIN — FAMOTIDINE 20 MG: 20 TABLET, FILM COATED ORAL at 08:02

## 2017-02-15 RX ADMIN — ALBUTEROL SULFATE 2.5 MG: 2.5 SOLUTION RESPIRATORY (INHALATION) at 11:02

## 2017-02-15 RX ADMIN — MUPIROCIN 1 G: 20 OINTMENT TOPICAL at 08:02

## 2017-02-15 RX ADMIN — DOCUSATE SODIUM 100 MG: 100 CAPSULE, LIQUID FILLED ORAL at 08:02

## 2017-02-15 RX ADMIN — HYDROCODONE BITARTRATE AND ACETAMINOPHEN 1 TABLET: 10; 325 TABLET ORAL at 03:02

## 2017-02-15 NOTE — PROGRESS NOTES
Pt seen at bedside, preparing for discharge.  Pt states pain is manageable with infusing adductor canal catheter and supplemental PO hydrocodone, denies paresthesias or weakness.  On-Q pump attached to catheter and set to infuse at 8ml/hr.  Pt to remove catheter upon completion of infusion. Counseled patient on maintenance of catheter as well as common trouble-shooting techniques.  Pt verbalizes understanding and agrees.

## 2017-02-15 NOTE — ADDENDUM NOTE
Addendum  created 02/15/17 1011 by Pablo Mathews MD    Anesthesia Event edited, Procedure Event Log accessed

## 2017-02-15 NOTE — PLAN OF CARE
Problem: Occupational Therapy Goal  Goal: Occupational Therapy Goal  Goals to be met by: 02/21/17     Patient will increase functional independence with ADLs by performing:    UE Dressing with Modified [place].  Grooming while standing at sink with Modified [place].  Toileting from toilet with Modified [place] for hygiene and clothing management.   Toilet transfer to toilet with Modified [place].   Outcome: Unable to achieve outcome(s) by discharge  Patient performs self care and functional mobility with (S). Patient and spouse are aware of Polar Ice use and LE dressing techniques S/P TKA. Patient will have assist as needed at home.

## 2017-02-15 NOTE — CONSULTS
Patient is well-know to us.  He has  History of asthmatic bronchitis.  He underwent left knee surgery this Monday and this morning had an episode of shortness of breath-bronchospasm .  His chest x-ray is normal , white count is norm , saturation varied between 93 and 98.  He does have some brohospasm to auscultation.  Normal sinus rhm is present.      Is doing well and will be discharged home on Breo ellipta . I will see him in the office in one week.

## 2017-02-15 NOTE — PLAN OF CARE
02/15/17 1317   Final Note   Assessment Type Final Discharge Note   Discharge Disposition Home-Health   Discharge planning education complete? Yes   Hospital Follow Up  Appt(s) scheduled? No  (MD is a partner of operative MD and will go to the office to visit )   Discharge plans and expectations educations in teach back method with documentation complete? Yes   Offered [facility] -- Bedside Delivery? n/a   Discharge/Hospital Encounter Summary to (non-[facility]) PCP n/a   Referral to Outpatient Case Management complete? No   Referral to / orders for Home Health Complete? Yes   30 day supply of medicines given at discharge, if documented non-compliance / non-adherence? n/a   Any social issues identified prior to discharge? No   Did you assess the readiness or willingness of the family or caregiver to support self management of care? Yes   Right Care Referral Info   Post Acute Recommendation Home-care   Facility Name [facility]

## 2017-02-15 NOTE — ANESTHESIA POSTPROCEDURE EVALUATION
"Anesthesia Post Evaluation    Patient: Christophe Renteria    Procedure(s) Performed: Procedure(s) (LRB):  ARTHROPLASTY-KNEE (Left)    Final Anesthesia Type: general  Patient location during evaluation: PACU  Patient participation: Yes- Able to Participate  Level of consciousness: awake and alert and oriented  Post-procedure vital signs: reviewed and stable  Pain management: adequate  Airway patency: patent  PONV status at discharge: No PONV  Anesthetic complications: no      Cardiovascular status: blood pressure returned to baseline and hemodynamically stable  Respiratory status: unassisted and spontaneous ventilation  Hydration status: euvolemic  Follow-up not needed.        Visit Vitals    /73    Pulse 68    Temp 36.9 °C (98.5 °F)    Resp 18    Ht 5' 7" (1.702 m)    Wt 85 kg (187 lb 6.3 oz)    SpO2 95%    BMI 29.35 kg/m2       Pain/Zo Score: Pain Assessment Performed: Yes (2/15/2017  4:09 AM)  Presence of Pain: denies (2/15/2017  4:09 AM)  Pain Rating Prior to Med Admin: 8 (2/15/2017  6:01 AM)  Pain Rating Post Med Admin: 3 (2/14/2017 10:23 PM)      "

## 2017-02-15 NOTE — ADDENDUM NOTE
Addendum  created 02/15/17 1016 by Pablo Mathews MD    Anesthesia Event edited, Procedure Event Log accessed, Sign clinical note

## 2017-02-15 NOTE — PT/OT/SLP PROGRESS
Occupational Therapy  Treatment/Discharge    Christophe Renteria   MRN: [id number]   Admitting Diagnosis: Degenerative joint disease of knee, left    OT Date of Treatment: 02/15/17   OT Start Time: 953  OT Stop Time:   OT Total Time (min): 52 min    Billable Minutes:  Self Care/Home Management 26, Therapeutic Activity 26 and Total Time 52    General Precautions: Standard, fall  Orthopedic Precautions: LLE weight bearing as tolerated  Braces: N/A         Subjective:  Communicated with patient prior to session.      Pain Ratin/10 (at rest but 8/10 pain with mvmt)  Location - Side: Left     Location: knee          Objective:  Patient found with: SCD, Polar ice (On Q pump catheter to left knee)     Functional Mobility:  Bed Mobility:  Supine to Sit: Stand by Assistance    Transfers:   Sit <> Stand Assistance: Stand By Assistance (1x from EOB, 1x from toilet)  Sit <> Stand Assistive Device: Rolling Walker  Toilet Transfer Technique: Stand Pivot  Toilet Transfer Assistance: Modified Independent, Supervision  Toilet Transfer Assistive Device: Rolling Walker, grab bar    Functional Ambulation: Patient amb using a RW from bed to sink to toilet to bed with (S).    Activities of Daily Living:    UE Dressing Level of Assistance: Contact guard   LE Dressing: CGA to don pants with VC technique  LE adaptive equipment: none  Grooming Position: Standing at sink  Grooming Level of Assistance: Supervision  Toileting Where Assessed: Toilet  Toileting Level of Assistance: Modified independent  Bathing Where Assessed: Edge of bed  Bathing Level of Assistance: Modified independent (wipes/sponge bath)    Balance:   Static Sit: GOOD: Takes MODERATE challenges from all directions  Dynamic Sit: GOOD: Maintains balance through MODERATE excursions of active trunk movement  Static Stand: GOOD: Takes MODERATE challenges from all directions  Dynamic stand: GOOD: Needs SUPERVISION only during gait and able to self right with moderate      Therapeutic Activities and Exercises:  Patient and spouse were educated re: Polar Ice use and LE dressing techniques for s/p TKA.    AM-PAC 6 CLICK ADL   How much help from another person does this patient currently need?   1 = Unable, Total/Dependent Assistance  2 = A lot, Maximum/Moderate Assistance  3 = A little, Minimum/Contact Guard/Supervision  4 = None, Modified [place]/Independent    Putting on and taking off regular lower body clothing? : 3  Bathing (including washing, rinsing, drying)?: 4  Toileting, which includes using toilet, bedpan, or urinal? : 4  Putting on and taking off regular upper body clothing?: 4  Taking care of personal grooming such as brushing teeth?: 4  Eating meals?: 4  Total Score: 23     AM-PAC Raw Score CMS G-Code Modifier Level of Impairment Assistance   6 % Total / Unable   7 - 9 CM 80 - 100% Maximal Assist   10 - 14 CL 60 - 80% Moderate Assist   15 - 19 CK 40 - 60% Moderate Assist   20 - 22 CJ 20 - 40% Minimal Assist   23 CI 1-20% SBA / CGA   24 CH 0% Independent/ Mod I     Patient left up in chair with all lines intact, call button in reach, nurse, Tk notified and spouse present    ASSESSMENT:  Christophe Renteria is a 66 y.o. male with a medical diagnosis of Degenerative joint disease of knee, left. Patient performs self care and functional mobility with (S). Patient and spouse are aware of Polar Ice use and LE dressing techniques S/P TKA. Patient will have assist as needed at home. Patient is expected to D/C to home today.      Rehab identified problem list/impairments: Rehab identified problem list/impairments: impaired self care skills, impaired functional mobilty, pain, orthopedic precautions, impaired skin    Rehab potential is good.    Activity tolerance: Good    Discharge recommendations: Discharge Facility/Level Of Care Needs: home with home health (with family assist)     Barriers to discharge: Barriers to Discharge: None    Equipment recommendations:  bath bench, walker, rolling     GOALS:   Occupational Therapy Goals        Problem: Occupational Therapy Goal    Goal Priority Disciplines Outcome Interventions   Occupational Therapy Goal     OT, PT/OT Unable to achieve outcome(s) by discharge    Description:  Goals to be met by: 02/21/17     Patient will increase functional independence with ADLs by performing:    UE Dressing with Modified [place].  Grooming while standing at sink with Modified [place].  Toileting from toilet with Modified [place] for hygiene and clothing management.   Toilet transfer to toilet with Modified [place].                Plan:  D/C OT  Plan of Care reviewed with: patient, spouse         Cherelle Aleksandr, OT  02/15/2017

## 2017-02-15 NOTE — PLAN OF CARE
Problem: Fall Risk (Adult)  Intervention: Monitor/Assist with Self Care    02/15/17 0515   Activity   Activity Assistance Provided independent;assistance, 1 person   Functional Level Current   Ambulation 0 - independent   Transferring 0 - independent   Toileting 0 - independent   Bathing 0 - independent   Dressing 0 - independent       Intervention: Patient Rounds    02/15/17 0515   Safety Interventions   Patient Rounds bed in low position;bed wheels locked;call light in reach;ID band on;clutter free environment maintained;placement of personal items at bedside;toileting offered;visualized patient           Problem: Pain, Acute (Adult)  Intervention: Mutually Develop/Implement Acute Pain Management Plan    02/15/17 0515   Pain/Comfort/Sleep Interventions   Pain Management Interventions pain management plan reviewed with patient/caregiver;pain pump in use;pillow support provided;position adjusted;premedicated for activity   Cognitive Interventions   Sensory Stimulation Regulation care clustered;lighting decreased       Intervention: Support/Optimize Psychosocial Response to Acute Pain    02/15/17 0515   Psychosocial Support   Trust Relationship/Rapport care explained;choices provided

## 2017-02-15 NOTE — PT/OT/SLP PROGRESS
Physical Therapy  Treatment / Discharge    Christophe Renteria   MRN: [id number]   Admitting Diagnosis: Degenerative joint disease of knee, left    PT Received On: 02/15/17  PT Start Time: 1058     PT Stop Time: 1122    PT Total Time (min): 24 min       Billable Minutes:  Gait Training 12  and Therapeutic Exercise 12    Treatment Type: Treatment  PT/PTA: PT     PTA Visit Number: 0       General Precautions: Standard, fall  Orthopedic Precautions: LLE weight bearing as tolerated   Braces: N/A    Subjective:  Communicated with nurse Frey prior to session.  Patient agreeable to participate in PT session.      Pain Rating:  (pain with movement but did not rate with number)  Location - Side: Left  Location: knee  Pain Addressed: Pre-medicate for activity, Reposition, Cessation of Activity       Objective:   Patient found with: Polar ice (Q pump with catheter in adductor canal )    Functional Mobility:  Bed Mobility:   Sit to Supine: Modified Independent    Transfers:  Sit <> Stand Assistance: Supervision  Sit <> Stand Assistive Device: Rolling Walker    Gait:   Gait Distance: ~500ft   Assistance 1: Supervision  Gait Assistive Device: Rolling walker  Gait Pattern: reciprocal  Gait Deviation(s): decreased lorne, decreased step length, decreased velocity of limb motion    Balance:   Static Sit: NORMAL: No deviations seen in posture held statically  Dynamic Sit: GOOD+: Maintains balance through MAXIMAL excursions of active trunk motion  Static Stand: GOOD: Takes MODERATE challenges from all directions  Dynamic stand: GOOD-: Needs SUPERVISION only during gait and able to self right with moderate      Therapeutic Activities and Exercises:  Patient performed B LE seated therex on edge of bed x20 reps for A/P, LAQ, heel slides, hip flex, and pillow squeezes. Patient able to recall all exercises independently.     CPM placed to left knee at 11:20AM and nurse notified to remove at 1:20PM.     AM-PAC 6 CLICK MOBILITY  How much  help from another person does this patient currently need?   1 = Unable, Total/Dependent Assistance  2 = A lot, Maximum/Moderate Assistance  3 = A little, Minimum/Contact Guard/Supervision  4 = None, Modified [place]/Independent    Turning over in bed (including adjusting bedclothes, sheets and blankets)?: 4  Sitting down on and standing up from a chair with arms (e.g., wheelchair, bedside commode, etc.): 4  Moving from lying on back to sitting on the side of the bed?: 4  Moving to and from a bed to a chair (including a wheelchair)?: 4  Need to walk in hospital room?: 4  Climbing 3-5 steps with a railing?: 3  Total Score: 23    AM-PAC Raw Score CMS G-Code Modifier Level of Impairment Assistance   6 % Total / Unable   7 - 9 CM 80 - 100% Maximal Assist   10 - 14 CL 60 - 80% Moderate Assist   15 - 19 CK 40 - 60% Moderate Assist   20 - 22 CJ 20 - 40% Minimal Assist   23 CI 1-20% SBA / CGA   24 CH 0% Independent/ Mod I     Patient left supine with all lines intact, call button in reach, nurse notified and spouse present.    Assessment:  Christophe Renteria is a 66 y.o. male with a medical diagnosis of Degenerative joint disease of knee, left and presents with decreased ROM and pain due to recent L TKA. He would continue to benefit from PT while in the hospital in order to address deficits listed below and get patient back to PLOF.     Rehab identified problem list/impairments: Rehab identified problem list/impairments: weakness, impaired balance, gait instability, impaired functional mobilty, pain, impaired skin, decreased ROM, decreased lower extremity function, edema, orthopedic precautions    Rehab potential is good.    Activity tolerance: Good    Discharge recommendations: Discharge Facility/Level Of Care Needs: home health PT (with family assist as needed)     Barriers to discharge: Barriers to Discharge: None    Equipment recommendations: Equipment Needed After Discharge: bath bench, walker, rolling      GOALS:   Physical Therapy Goals        Problem: Physical Therapy Goal    Goal Priority Disciplines Outcome Goal Variances Interventions   Physical Therapy Goal     PT/OT, PT      Description:  Goals to be met by: 17     Patient will increase functional independence with mobility by performin. Sit to stand transfer with Modified [place]  2. Gait  x 500 feet with Modified [place] using Rolling Walker  3. Ascend/descend 5 stair with bilateral Handrails Modified [place]   4. Lower extremity exercise program x30 reps per handout, with independence              PLAN:    Patient to be seen BID  to address the above listed problems via gait training, therapeutic activities, therapeutic exercises  Plan of Care expires: 17  Plan of Care reviewed with: patient, spouse    PT G-Codes  Functional Assessment Tool Used: AM PAC   Score: 23  Functional Limitation: Mobility: Walking and moving around  Mobility: Walking and Moving Around Current Status (): CI  Mobility: Walking and Moving Around Goal Status (): CI  Mobility: Walking and Moving Around Discharge Status (): CI    Marion Chaidez, PT, MOT  02/15/2017

## 2017-02-15 NOTE — PROGRESS NOTES
Rolling walker delivered to patients bedside. Transfer tub bench to patients home. TN reviewed follow up appointment information with patient and wife. Patient is in agreement and verbalized an understanding. Placed discharge information in blue discharge folder.

## 2017-02-15 NOTE — PROGRESS NOTES
POD 2  Pain better controlled  Now with lung congestion, request to see Dr Holman    AFVSS  Labs stable, BUN/Cr stable  NVI, nosign DVT    Plan cont PT  Resp treatment  Consult Vy,D/C 1-2d

## 2017-02-15 NOTE — DISCHARGE SUMMARY
[facility]  Discharge Summary      Admit Date: 2/13/2017    Discharge Date and Time:  02/15/2017 12:20 PM    Attending Physician: Ricky Sanders MD     Reason for Admission: L TKR    Procedures Performed: Procedure(s) (LRB):  ARTHROPLASTY-KNEE (Left)    Hospital Course (synopsis of major diagnoses, care, treatment, and services provided during the course of the hospital stay): admitted for elective L TKR. Underwent procedure without complication. Followed with PT BID. D?C home with  PT and regular diet.    Consults: PT    Significant Diagnostic Studies: none    Final Diagnoses:    Principal Problem: Degenerative joint disease of knee, left   Secondary Diagnoses:   Discharged Condition: good    Disposition: Home-Health Care [facility]    Follow Up/Patient Instructions:     Medications:  Reconciled Home Medications:   Current Discharge Medication List      START taking these medications    Details   aspirin (ECOTRIN) 325 MG EC tablet Take 1 tablet (325 mg total) by mouth once daily.  Refills: 0      bisacodyl (DULCOLAX) 10 mg Supp Place 1 suppository (10 mg total) rectally daily as needed (Until bowel movement if patient has no bowel movement for 2 days).  Refills: 0      famotidine (PEPCID) 20 MG tablet Take 1 tablet (20 mg total) by mouth 2 (two) times daily.  Qty: 60 tablet, Refills: 0         CONTINUE these medications which have NOT CHANGED    Details   amlodipine (NORVASC) 10 MG tablet Take 1 tablet (10 mg total) by mouth once daily.  Qty: 90 tablet, Refills: 3      bisoprolol (ZEBETA) 10 MG tablet Take 1 tablet (10 mg total) by mouth once daily.  Qty: 30 tablet, Refills: 11    Associated Diagnoses: Preop cardiovascular exam      calcium citrate (CALCITRATE) 200 mg (950 mg) tablet Take 1 tablet by mouth once daily.      magnesium oxide (MAG-OX) 400 mg tablet Take 400 mg by mouth once daily.      multivitamin capsule Take 1 capsule by mouth once daily.      potassium citrate 15 mEq TbSR      "  pravastatin (PRAVACHOL) 40 MG tablet Take 1 tablet (40 mg total) by mouth once daily.  Qty: 90 tablet, Refills: 3      ADALIMUMAB (HUMIRA PEN SUBQ) Inject 40 mg into the skin. EVERY 2 WEEKS             Discharge Procedure Orders  TRANSFER TUB BENCH FOR HOME USE   Order Specific Question Answer Comments   Type of Transfer Tub Bench: Unpadded    Height: 5' 7" (1.702 m)    Weight: 85 kg (187 lb 6.3 oz)    Does patient have medical equipment at home? cane, straight    Length of need (1-99 months): 99      WALKER FOR HOME USE   Order Specific Question Answer Comments   Type of Walker: Adult (5'4"-6'6")    With wheels? Yes    Height: 5' 7" (1.702 m)    Weight: 85 kg (187 lb 6.3 oz)    Length of need (1-99 months): 99    Does patient have medical equipment at home? cane, jennifer    Please check all that apply: Patient's condition impairs ambulation.    Please check all that apply: Patient is unable to safely ambulate without equipment.    Please check all that apply: Walker will be used for gait training.    Please check all that apply: Patient needs help to get in and out of chair.      Ambulatory Consult to Home Health   Referral Priority: Routine Referral Type: Home Health Care   Referral Reason: Specialty Services Required    Requested Specialty: Home Health Services    Number of Visits Requested: 1        Follow-up Information     Follow up with Ricky Sanders MD. Schedule an appointment as soon as possible for a visit in 2 weeks.    Specialty:  Orthopedic Surgery    Contact information:    [address] [facility]  [place]  [place] [address]  [phone]          Follow up with [facility].    Specialty:  Home Health Services    Why:  Home Health    Contact information:    [address]  [place] [id number] [phone]          Follow up with [facility].    Specialty:  [facility] Provider    Why:  DME-rolling walker and transfer tub bench     Contact information:    [address]  [place] [facility] [address]  [phone]

## 2017-02-16 NOTE — ANESTHESIA PROCEDURE NOTES
Left adductor canal    Patient location during procedure: post-op   Block not for primary anesthetic.  Reason for block: at surgeon's request and post-op pain management   Post-op Pain Location: left knee  Start time: 2/14/2017 12:13 PM  Timeout: 2/14/2017 12:13 PM   End time: 2/14/2017 12:18 PM  Staffing  Anesthesiologist: ALINE DESOUZA  Performed by: anesthesiologist   Preanesthetic Checklist  Completed: patient identified, site marked, surgical consent, pre-op evaluation, timeout performed, IV checked, risks and benefits discussed and monitors and equipment checked  Peripheral Block  Patient position: supine  Prep: ChloraPrep  Patient monitoring: heart rate, cardiac monitor, continuous pulse ox and frequent blood pressure checks  Block type: adductor canal  Laterality: left  Injection technique: continuous  Needle  Needle type: Tuohy   Needle gauge: 17 G  Needle length: 4 in  Needle localization: anatomical landmarks and ultrasound guidance  Needle insertion depth: 6 cm  Catheter type: non-stimulating  Catheter size: 18 G  Catheter at skin depth: 7 cm  Test dose: negative and lidocaine 1.5% with Epi 1-to-200,000   -ultrasound image captured on disc.  Assessment  Injection assessment: negative aspiration, negative parasthesia and local visualized surrounding nerve  Paresthesia pain: none  Heart rate change: no  Slow fractionated injection: yes  Medications:  Bolus administered: 15 mL of 0.5 ropivacaine  Epinephrine added: none  Additional Notes  Pt Tolerated procedure well with no complications. Vital signs stable throughout with negative test dose. Good visualization of needle and LA spread on ultrasound. Dosage modified to be compatible with previous Exparel administration.

## 2017-02-16 NOTE — ADDENDUM NOTE
Addendum  created 02/16/17 1421 by Carmencita Hernandez MD    Anesthesia Intra Blocks edited, Anesthesia Intra Flowsheets edited, Child order released for a procedure order, LDA created via procedure documentation, LDA updated via procedure documentation, Sign clinical note

## 2017-02-16 NOTE — ANESTHESIA PROCEDURE NOTES
Left IPACK    Patient location during procedure: post-op   Block not for primary anesthetic.  Reason for block: at surgeon's request and post-op pain management   Post-op Pain Location: left knee  Start time: 2/14/2017 12:21 PM  Timeout: 2/14/2017 12:21 PM   End time: 2/14/2017 12:26 PM  Staffing  Anesthesiologist: ALINE DESOUZA  Performed by: anesthesiologist   Preanesthetic Checklist  Completed: patient identified, site marked, surgical consent, pre-op evaluation, timeout performed, IV checked, risks and benefits discussed and monitors and equipment checked  Peripheral Block  Patient position: supine  Prep: ChloraPrep  Patient monitoring: heart rate, cardiac monitor, continuous pulse ox and frequent blood pressure checks  Block type: I PACK  Laterality: left  Injection technique: single shot  Needle  Needle type: Stimuplex   Needle gauge: 22 G  Needle length: 4 in  Needle localization: anatomical landmarks and ultrasound guidance  Needle insertion depth: 5 cm   -ultrasound image captured on disc.  Assessment  Injection assessment: negative aspiration, negative parasthesia and local visualized surrounding nerve  Paresthesia pain: none  Heart rate change: no  Slow fractionated injection: yes  Medications:  Bolus administered: 20 mL of 0.25 bupivacaine  Epinephrine added: 2.5 mcg/mL (1/400,000)  Additional Notes  Pt Tolerated procedure well with no complications. Vital signs stable throughout with negative test dose. Good visualization of needle and LA spread on ultrasound.  Dose calculated to be compatible with h/o Exparel administration.

## 2017-02-16 NOTE — ANESTHESIA POST-OP PAIN MANAGEMENT
Acute Pain Service Progress Note    Christophe Renteria is a 66 y.o., male, [id number].    Surgery:  Left TKR    Post Op Day #: 1    Catheter type: perineural  adductor canal    Infusion type: Ropivacaine 0.2%  8 basal, 4 demand, 30 minute lockout and 20 1 hour limit    Problem List:    Active Hospital Problems    Diagnosis  POA    *Degenerative joint disease of knee, left [M17.12]  Yes      Resolved Hospital Problems    Diagnosis Date Resolved POA   No resolved problems to display.       Subjective:     General appearance of alert, oriented, no complaints   Pain with rest: 1    Numbers   Pain with movement: 6    Numbers   Side Effects    1. Pruritis No    2. Nausea No    3. Motor Blockade No, 0=Ability to raise lower extremities off bed    4. Sedation No, 1=awake and alert    Objective:     Catheter level left adductor canal   Catheter site clean, dry, intact   Catheter placement 7 cm @skin      Vitals   Vitals:    02/15/17 1213   BP: 124/75   Pulse: 68   Resp: 18   Temp: 36.8 °C (98.2 °F)        Labs    Admission on 02/13/2017, Discharged on 02/15/2017   Component Date Value Ref Range Status    WBC 02/13/2017 5.35  3.90 - 12.70 K/uL Final    RBC 02/13/2017 3.39* 4.60 - 6.20 M/uL Final    Hemoglobin 02/13/2017 11.3* 14.0 - 18.0 g/dL Final    Hematocrit 02/13/2017 33.6* 40.0 - 54.0 % Final    MCV 02/13/2017 99* 82 - 98 fL Final    MCH 02/13/2017 33.3* 27.0 - 31.0 pg Final    MCHC 02/13/2017 33.6  32.0 - 36.0 % Final    RDW 02/13/2017 14.0  11.5 - 14.5 % Final    Platelets 02/13/2017 166  150 - 350 K/uL Final    MPV 02/13/2017 10.3  9.2 - 12.9 fL Final    Gran # 02/13/2017 2.8  1.8 - 7.7 K/uL Final    Lymph # 02/13/2017 1.7  1.0 - 4.8 K/uL Final    Mono # 02/13/2017 0.7  0.3 - 1.0 K/uL Final    Eos # 02/13/2017 0.2  0.0 - 0.5 K/uL Final    Baso # 02/13/2017 0.03  0.00 - 0.20 K/uL Final    Gran% 02/13/2017 52.1  38.0 - 73.0 % Final    Lymph% 02/13/2017 31.4  18.0 - 48.0 % Final    Mono% 02/13/2017 12.3   4.0 - 15.0 % Final    Eosinophil% 02/13/2017 3.6  0.0 - 8.0 % Final    Basophil% 02/13/2017 0.6  0.0 - 1.9 % Final    Differential Method 02/13/2017 Automated   Final    Sodium 02/13/2017 139  136 - 145 mmol/L Final    Potassium 02/13/2017 3.9  3.5 - 5.1 mmol/L Final    Chloride 02/13/2017 107  95 - 110 mmol/L Final    CO2 02/13/2017 23  23 - 29 mmol/L Final    Glucose 02/13/2017 94  70 - 110 mg/dL Final    BUN, Bld 02/13/2017 35* 8 - 23 mg/dL Final    Creatinine 02/13/2017 2.2* 0.5 - 1.4 mg/dL Final    Calcium 02/13/2017 8.6* 8.7 - 10.5 mg/dL Final    Anion Gap 02/13/2017 9  8 - 16 mmol/L Final    eGFR if  02/13/2017 35* >60 mL/min/1.73 m^2 Final    eGFR if non  02/13/2017 31* >60 mL/min/1.73 m^2 Final    WBC 02/14/2017 11.14  3.90 - 12.70 K/uL Final    RBC 02/14/2017 3.52* 4.60 - 6.20 M/uL Final    Hemoglobin 02/14/2017 11.6* 14.0 - 18.0 g/dL Final    Hematocrit 02/14/2017 35.0* 40.0 - 54.0 % Final    MCV 02/14/2017 99* 82 - 98 fL Final    MCH 02/14/2017 33.0* 27.0 - 31.0 pg Final    MCHC 02/14/2017 33.1  32.0 - 36.0 % Final    RDW 02/14/2017 14.1  11.5 - 14.5 % Final    Platelets 02/14/2017 177  150 - 350 K/uL Final    MPV 02/14/2017 10.1  9.2 - 12.9 fL Final    Gran # 02/14/2017 8.4* 1.8 - 7.7 K/uL Final    Lymph # 02/14/2017 1.5  1.0 - 4.8 K/uL Final    Mono # 02/14/2017 1.3* 0.3 - 1.0 K/uL Final    Eos # 02/14/2017 0.0  0.0 - 0.5 K/uL Final    Baso # 02/14/2017 0.01  0.00 - 0.20 K/uL Final    Gran% 02/14/2017 75.1* 38.0 - 73.0 % Final    Lymph% 02/14/2017 13.1* 18.0 - 48.0 % Final    Mono% 02/14/2017 11.7  4.0 - 15.0 % Final    Eosinophil% 02/14/2017 0.0  0.0 - 8.0 % Final    Basophil% 02/14/2017 0.1  0.0 - 1.9 % Final    Differential Method 02/14/2017 Automated   Final    Sodium 02/14/2017 134* 136 - 145 mmol/L Final    Potassium 02/14/2017 4.5  3.5 - 5.1 mmol/L Final    Chloride 02/14/2017 103  95 - 110 mmol/L Final    CO2 02/14/2017 23   23 - 29 mmol/L Final    Glucose 02/14/2017 131* 70 - 110 mg/dL Final    BUN, Bld 02/14/2017 36* 8 - 23 mg/dL Final    Creatinine 02/14/2017 2.6* 0.5 - 1.4 mg/dL Final    Calcium 02/14/2017 8.6* 8.7 - 10.5 mg/dL Final    Anion Gap 02/14/2017 8  8 - 16 mmol/L Final    eGFR if  02/14/2017 29* >60 mL/min/1.73 m^2 Final    eGFR if non  02/14/2017 25* >60 mL/min/1.73 m^2 Final    WBC 02/15/2017 8.67  3.90 - 12.70 K/uL Final    RBC 02/15/2017 3.05* 4.60 - 6.20 M/uL Final    Hemoglobin 02/15/2017 10.1* 14.0 - 18.0 g/dL Final    Hematocrit 02/15/2017 31.0* 40.0 - 54.0 % Final    MCV 02/15/2017 102* 82 - 98 fL Final    MCH 02/15/2017 33.1* 27.0 - 31.0 pg Final    MCHC 02/15/2017 32.6  32.0 - 36.0 % Final    RDW 02/15/2017 14.2  11.5 - 14.5 % Final    Platelets 02/15/2017 154  150 - 350 K/uL Final    MPV 02/15/2017 10.9  9.2 - 12.9 fL Final    Gran # 02/15/2017 5.8  1.8 - 7.7 K/uL Final    Lymph # 02/15/2017 1.3  1.0 - 4.8 K/uL Final    Mono # 02/15/2017 1.5* 0.3 - 1.0 K/uL Final    Eos # 02/15/2017 0.0  0.0 - 0.5 K/uL Final    Baso # 02/15/2017 0.01  0.00 - 0.20 K/uL Final    Gran% 02/15/2017 67.4  38.0 - 73.0 % Final    Lymph% 02/15/2017 14.6* 18.0 - 48.0 % Final    Mono% 02/15/2017 17.6* 4.0 - 15.0 % Final    Eosinophil% 02/15/2017 0.3  0.0 - 8.0 % Final    Basophil% 02/15/2017 0.1  0.0 - 1.9 % Final    Differential Method 02/15/2017 Automated   Final    Sodium 02/15/2017 137  136 - 145 mmol/L Final    Potassium 02/15/2017 4.1  3.5 - 5.1 mmol/L Final    Chloride 02/15/2017 105  95 - 110 mmol/L Final    CO2 02/15/2017 23  23 - 29 mmol/L Final    Glucose 02/15/2017 125* 70 - 110 mg/dL Final    BUN, Bld 02/15/2017 34* 8 - 23 mg/dL Final    Creatinine 02/15/2017 2.4* 0.5 - 1.4 mg/dL Final    Calcium 02/15/2017 8.4* 8.7 - 10.5 mg/dL Final    Anion Gap 02/15/2017 9  8 - 16 mmol/L Final    eGFR if  02/15/2017 32* >60 mL/min/1.73 m^2 Final     eGFR if non African American 02/15/2017 27* >60 mL/min/1.73 m^2 Final        Meds   No current facility-administered medications for this encounter.      Current Outpatient Prescriptions   Medication Sig Dispense Refill    amlodipine (NORVASC) 10 MG tablet Take 1 tablet (10 mg total) by mouth once daily. 90 tablet 3    bisoprolol (ZEBETA) 10 MG tablet Take 1 tablet (10 mg total) by mouth once daily. 30 tablet 11    calcium citrate (CALCITRATE) 200 mg (950 mg) tablet Take 1 tablet by mouth once daily.      magnesium oxide (MAG-OX) 400 mg tablet Take 400 mg by mouth once daily.      multivitamin capsule Take 1 capsule by mouth once daily.      potassium citrate 15 mEq TbSR       pravastatin (PRAVACHOL) 40 MG tablet Take 1 tablet (40 mg total) by mouth once daily. 90 tablet 3    ADALIMUMAB (HUMIRA PEN SUBQ) Inject 40 mg into the skin. EVERY 2 WEEKS      aspirin (ECOTRIN) 325 MG EC tablet Take 1 tablet (325 mg total) by mouth once daily.  0    bisacodyl (DULCOLAX) 10 mg Supp Place 1 suppository (10 mg total) rectally daily as needed (Until bowel movement if patient has no bowel movement for 2 days).  0    famotidine (PEPCID) 20 MG tablet Take 1 tablet (20 mg total) by mouth 2 (two) times daily. 60 tablet 0            Assessment:     Pain control adequate. Much improved since nerve blocks from pain score rest/movement 3/10 to 1/6. PT participation and goals much improved. Nausea resolved since reduced opioid requirements.   Pt unable to take NSAIDS and has had several rounds of IV acetaminophen. Pt is also optimizing use of polar ice. Pt h/o injection with Exparel and Bupivicaine intraop. Local anesthetic dose has been  modified to be compatible with this dosing.    Plan:     Patient doing well, continue present treatment.     Lyrica 150mg QHS added.   Primary team starting po Vicodin and d/c IV morphine.   Recommend optimizing po Tylenol not to exceed maximum daily dosage.   Plan to d/c home tomorrow with  option for home OnQ pump.   Pt, family, and primary team aware of plan and agree. Aware of how to contact anesthesia team.   Please call with any questions or concerns.    Evaluator Carmencita Hernandez

## 2017-02-17 ENCOUNTER — PATIENT OUTREACH (OUTPATIENT)
Dept: ADMINISTRATIVE | Facility: CLINIC | Age: 67
End: 2017-02-17
Payer: COMMERCIAL

## 2017-02-17 NOTE — PATIENT INSTRUCTIONS
Discharge Instructions for Total Knee Replacement  You have undergone knee replacement surgery. The knee joint forms where the thighbone, shinbone, and kneecap meet. The knee joint is supported by muscles and ligaments, and is lined with a cushioning called cartilage. Over time, cartilage wears away. This can make the knee feel stiff and painful. Your doctor replaced your painful joint with a knee prosthesis (artificial joint) to relieve pain and restore movement. Here are some instructions to follow once at home.  Home Care  When you are allowed to shower, carefully wash your incision with soap and water. Rinse the incision well. Then gently pat it dry. Dont rub the incision, or apply creams or lotions. And sit on a shower stool or chair when you shower to keep from falling.  Take pain medication as directed by your doctor.  Sitting and Sleeping  Sit in chairs with arms. The arms make it easier for you to stand up or sit down.  Dont sit for more than 30-45 minutes at one time.  Nap if you are tired, but dont stay in bed all day.  Sleep with a pillow under your ankle, not your knee. Be sure to change the position of your leg during the night.  Moving Safely  The key to successful recovery is movement is walking and exercising your knee as directed by your doctor.  Walk up and down stairs with support. Try one step at a time--good knee up, bad knee down. Use the railing if possible.  Dont drive until your doctor says its okay. Most people can start driving about 6 weeks after surgery. Dont drive while you are taking narcotic pain medication.  Other Precautions  Avoid soaking your knee in water (no hot tubs, bathtubs, swimming pools) until your doctor says its okay.  Wear the support stockings you were given in the hospital, as instructed by your doctor. You may wear these stockings for 4-6 weeks after surgery. If needed, you can place a bandage over the incision to prevent irritation from clothing or support  stockings.  Arrange your household to keep the items you need handy. Keep everything else out of the way. Remove items that may cause you to fall, such as throw rugs and electrical cords.  Use nonslip bath mats, grab bars, an elevated toilet seat, and a shower chair in your bathroom.  Until your balance, flexibility, and strength improve, use a cane, crutches, a walker, handrails, or someone to help you.  Keep your hands free by using a backpack, petra pack, apron, or pockets to carry things.  Prevent infection. Ask your doctor for instructions if you havent already received them. Any infection will need to be treated immediately with antibiotics. Call your doctor right away if you think you might have an infection.  Tell your dentist that you have an artificial joint and take antibiotics as prescribed before any dental work.  Tell all your healthcare providers about your artificial joint before any medical procedure.  Maintain a healthy weight. Get help to lose any extra pounds. Added body weight puts stress on the knee.  Take any medication you may have been given after surgery. This may include blood-thinning medications to prevent blood clots or antibiotics to prevent infection.  Follow-Up  Make a follow-up appointment as directed by our staff. You will need to have your staples removed 2-3 weeks following surgery.    When to Seek Medical Attention  Call 911 right away if you have:  Chest pain.  Shortness of breath.  Any pain or tenderness in your calf.  Otherwise, call your doctor immediately if you have:  Fever of 100.4°F higher, or shaking chills.  Stiffness, or inability to move the knee.  Increased swelling in your leg.  Increased redness, tenderness, or swelling in or around the knee incision.  Drainage from the knee incision.  Increased knee pain.   © 0276-3128 Velma Bhatia, [address], [place] [address]. All rights reserved. This information is not intended as a substitute for  professional medical care. Always follow your healthcare professional's instructions.

## 2017-02-17 NOTE — Clinical Note
Please forward this important TCC information to your provider in order to maximize the post discharge care delivery of this patient.  C3 nurse spoke with Dr Christophe Renteria  for a TCC post hospital discharge follow up call. The patient does not have a scheduled HOSFU appointment with Ehasn Small Jr, MD  within 7-14 days post hospital discharge date 2/15. C3 nurse was unable to schedule HOSFU appointment in [facility]. Please contact patient and schedule follow up appointment using HOSFU visit type on or before 3/1 Patient will stop by his office (ortho) for knee check-up.  Respectfully, Zakiya Silveira RN  [facility]   [email or web address]     Please do not reply to this message, as this inbox is not routinely monitored.

## 2017-02-20 NOTE — PHYSICIAN QUERY
"PT Name: Christophe Renteria  MR #: [id number]  Physician Query Form - CKD Clarification   Reviewer  Ext  - [email or web address]  This form is a permanent document in the medical record.     Query Date: February 20, 2017  By submitting this query, we are merely seeking further clarification of documentation. Please utilize your independent clinical judgment when addressing the question(s) below.    The Medical record reflects the following:   Indicators Supporting Clinical Findings Location in Medical Record    "CKD" or "Chronic Renal Insufficiency/Failure" documented  Chronic Renal Disease,  CRI Baseline cr 2.3   Anesthesia report    CR/BUN =               GFR = Creatine:  2.2/35   2/13    GFR:  31   2/13                   2.6/36   2/14               25  2/14                   2.4/34   2/15               27  2/15      Labs    Dialysis/CRRT       Treatment:      Other chronic conditions:      Other:      Provider, please specify the diagnosis or diagnoses associated with above clinical findings.       Chronic Kidney Disease (CKD) (specify stage if known)     [employer] Definitions    Stage Description  eGFR (mL/min)   [  ]     I Slight kidney damage with normal or increased filtration 90+   [  ]     II Mildly reduced kidney function 60-89   [  ]     III Moderately reduced kidney function 30-59   [  ]     IV Severely reduced kidney function 15-29   [  ]     V Kidney failure, requiring transplant or dialysis <15     [  ] Other Renal Diagnosis (Specify) _____________________________________________________    [ X ] Clinically Undetermined       Cause of CKD (specify)   [  ] HTN (specify) [  ] Benign [  ] Malignant [  ] Unspecified   [  ] DM (specify) [  ] Type I [  ] Type II [  ] Controlled [  ] Uncontrolled                                                   [  ] Unspecified   [  ] Hypertensive heart disease   [  ] Other Specified Cause  _________________________________   [ X ] " Clinically Undetermined       Please document in your progress notes daily for the duration of treatment, until resolved, and include in your discharge summary.

## 2017-03-08 ENCOUNTER — TELEPHONE (OUTPATIENT)
Dept: SLEEP MEDICINE | Facility: OTHER | Age: 67
End: 2017-03-08

## 2017-03-23 ENCOUNTER — LAB VISIT (OUTPATIENT)
Dept: LAB | Facility: HOSPITAL | Age: 67
End: 2017-03-23
Attending: INTERNAL MEDICINE
Payer: COMMERCIAL

## 2017-03-23 DIAGNOSIS — I10 ESSENTIAL HYPERTENSION, MALIGNANT: Primary | ICD-10-CM

## 2017-03-23 DIAGNOSIS — N28.1 ACQUIRED CYST OF KIDNEY: ICD-10-CM

## 2017-03-23 LAB
ALBUMIN SERPL BCP-MCNC: 3.3 G/DL
ALP SERPL-CCNC: 36 U/L
ALT SERPL W/O P-5'-P-CCNC: 18 U/L
ANION GAP SERPL CALC-SCNC: 12 MMOL/L
AST SERPL-CCNC: 14 U/L
BILIRUB SERPL-MCNC: 0.4 MG/DL
BUN SERPL-MCNC: 53 MG/DL
CALCIUM SERPL-MCNC: 11.5 MG/DL
CHLORIDE SERPL-SCNC: 104 MMOL/L
CO2 SERPL-SCNC: 22 MMOL/L
CREAT SERPL-MCNC: 5.2 MG/DL
EST. GFR  (AFRICAN AMERICAN): 12 ML/MIN/1.73 M^2
EST. GFR  (NON AFRICAN AMERICAN): 11 ML/MIN/1.73 M^2
GLUCOSE SERPL-MCNC: 105 MG/DL
POTASSIUM SERPL-SCNC: 4.1 MMOL/L
PROT SERPL-MCNC: 7.1 G/DL
SODIUM SERPL-SCNC: 138 MMOL/L

## 2017-03-23 PROCEDURE — 80053 COMPREHEN METABOLIC PANEL: CPT

## 2017-03-23 PROCEDURE — 36415 COLL VENOUS BLD VENIPUNCTURE: CPT

## 2017-04-06 ENCOUNTER — TELEPHONE (OUTPATIENT)
Dept: SLEEP MEDICINE | Facility: OTHER | Age: 67
End: 2017-04-06

## 2017-04-07 ENCOUNTER — HOSPITAL ENCOUNTER (OUTPATIENT)
Dept: SLEEP MEDICINE | Facility: OTHER | Age: 67
Discharge: HOME | End: 2017-04-07
Attending: PSYCHIATRY & NEUROLOGY
Payer: COMMERCIAL

## 2017-04-07 DIAGNOSIS — G47.30 SLEEP APNEA, UNSPECIFIED TYPE: ICD-10-CM

## 2017-04-07 DIAGNOSIS — G47.33 OSA (OBSTRUCTIVE SLEEP APNEA): ICD-10-CM

## 2017-04-07 PROCEDURE — 95800 SLP STDY UNATTENDED: CPT | Mod: 26,,, | Performed by: PSYCHIATRY & NEUROLOGY

## 2017-04-07 PROCEDURE — 95800 SLP STDY UNATTENDED: CPT

## 2017-04-16 ENCOUNTER — TELEPHONE (OUTPATIENT)
Dept: SLEEP MEDICINE | Facility: OTHER | Age: 67
End: 2017-04-16

## 2017-04-16 DIAGNOSIS — G47.33 OSA (OBSTRUCTIVE SLEEP APNEA): Primary | ICD-10-CM

## 2017-04-17 NOTE — TELEPHONE ENCOUNTER
Patient informed by phone his sleep study results and severity of SAMANTHA: AHI 65.    After consideration, he has opted for trial of CPAP. He prefers [facility] set up if possible.    PLAN:  1. Auto CPAP 6 to 18 cm; full face mask  2. Please schedule Sleep Clinic follow up for 5 weeks for CPAP adherence monitoring MD/NP  3. Consider titration study after initial usage

## 2017-05-17 ENCOUNTER — LAB VISIT (OUTPATIENT)
Dept: LAB | Facility: HOSPITAL | Age: 67
End: 2017-05-17
Attending: PHYSICAL MEDICINE & REHABILITATION
Payer: COMMERCIAL

## 2017-05-17 DIAGNOSIS — M25.551 RIGHT HIP PAIN: Primary | ICD-10-CM

## 2017-05-17 LAB
GRAM STN SPEC: NORMAL
GRAM STN SPEC: NORMAL

## 2017-05-17 PROCEDURE — 87070 CULTURE OTHR SPECIMN AEROBIC: CPT

## 2017-05-17 PROCEDURE — 87102 FUNGUS ISOLATION CULTURE: CPT

## 2017-05-17 PROCEDURE — 87116 MYCOBACTERIA CULTURE: CPT

## 2017-05-17 PROCEDURE — 87205 SMEAR GRAM STAIN: CPT

## 2017-05-17 PROCEDURE — 87075 CULTR BACTERIA EXCEPT BLOOD: CPT

## 2017-05-19 ENCOUNTER — OFFICE VISIT (OUTPATIENT)
Dept: FAMILY MEDICINE | Facility: CLINIC | Age: 67
End: 2017-05-19
Payer: COMMERCIAL

## 2017-05-19 VITALS
WEIGHT: 185.19 LBS | HEART RATE: 74 BPM | TEMPERATURE: 98 F | SYSTOLIC BLOOD PRESSURE: 124 MMHG | HEIGHT: 67 IN | BODY MASS INDEX: 29.07 KG/M2 | DIASTOLIC BLOOD PRESSURE: 64 MMHG

## 2017-05-19 DIAGNOSIS — I10 ESSENTIAL HYPERTENSION: Primary | ICD-10-CM

## 2017-05-19 PROCEDURE — 99213 OFFICE O/P EST LOW 20 MIN: CPT | Mod: S$GLB,,,

## 2017-05-19 PROCEDURE — 3078F DIAST BP <80 MM HG: CPT | Mod: S$GLB,,,

## 2017-05-19 PROCEDURE — 3074F SYST BP LT 130 MM HG: CPT | Mod: S$GLB,,,

## 2017-05-19 PROCEDURE — 99999 PR PBB SHADOW E&M-EST. PATIENT-LVL III: CPT | Mod: PBBFAC,,,

## 2017-05-19 PROCEDURE — 1160F RVW MEDS BY RX/DR IN RCRD: CPT | Mod: S$GLB,,,

## 2017-05-19 RX ORDER — PREDNISONE 20 MG/1
20 TABLET ORAL DAILY
COMMUNITY
End: 2017-06-13

## 2017-05-19 RX ORDER — TAMSULOSIN HYDROCHLORIDE 0.4 MG/1
CAPSULE ORAL
COMMUNITY
Start: 2017-05-02 | End: 2020-02-17 | Stop reason: SDUPTHER

## 2017-05-19 RX ORDER — TRAMADOL HYDROCHLORIDE 50 MG/1
TABLET ORAL
Status: ON HOLD | COMMUNITY
Start: 2017-05-12 | End: 2017-06-21 | Stop reason: HOSPADM

## 2017-05-19 NOTE — PROGRESS NOTES
Chief Complaint   Patient presents with    Hypertension       HPI  Christophe Renteria is a 66 y.o. male who presents to the office today for blood pressure check, essentially.  Recently, several months ago the patient had knee replacement, unfortunately this was complicated by what sounds like hypotension, acute renal failure, with creatinine up to 7.  Patient underwent a biopsy, diagnosis of nephritis was made, and the patient was appropriately treated with steroids, which she is also taking at the present time.  His baseline creatinine now is 1.9.  Pt is known to me.    Patient is wearing anti-embolic/anti-edema stockings which seemed to help with some support.  Patient is not having any problems such as chest pain, pleuritic pain, or the like.    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    Hypertension     Lumbar disc disease     Psoriatic arthritis     Renal disease        PAST SURGICAL HISTORY:  Past Surgical History:   Procedure Laterality Date    ANKLE SURGERY Right     BACK SURGERY      FRACTURE SURGERY      JOINT REPLACEMENT      Rt total hip    KNEE SURGERY  02/13/2017    ROTATOR CUFF REPAIR Right        SOCIAL HISTORY:  Social History     Social History    Marital status:      Spouse name: N/A    Number of children: N/A    Years of education: N/A     Occupational History     Dr./ [employer]     Social History Main Topics    Smoking status: Former Smoker     Types: Pipe     Quit date: 1985    Smokeless tobacco: Never Used    Alcohol use 0.0 oz/week      Comment: occassional    Drug use: No    Sexual activity: Not on file     Other Topics Concern    Not on file     Social History Narrative       FAMILY HISTORY:  Family History   Problem Relation Age of Onset    Cancer Mother     Hypertension Father     Stroke Father        ALLERGIES AND MEDICATIONS: updated and  reviewed.  Review of patient's allergies indicates:  No Known Allergies  Current Outpatient Prescriptions   Medication Sig Dispense Refill    multivitamin capsule Take 1 capsule by mouth once daily.      pravastatin (PRAVACHOL) 40 MG tablet Take 1 tablet (40 mg total) by mouth once daily. 90 tablet 3    predniSONE (DELTASONE) 20 MG tablet Take 20 mg by mouth once daily.      tamsulosin (FLOMAX) 0.4 mg Cp24       tramadol (ULTRAM) 50 mg tablet       amlodipine (NORVASC) 10 MG tablet Take 1 tablet (10 mg total) by mouth once daily. 90 tablet 3     No current facility-administered medications for this visit.        ROS  Review of Systems   Constitutional: Negative for activity change and fever.   Respiratory: Negative for shortness of breath.    Cardiovascular: Negative for chest pain and leg swelling.   Musculoskeletal:        Still having joint pain.  Getting around reasonably well with a cane.       Physical Exam  There were no vitals filed for this visit. There is no height or weight on file to calculate BMI.            Physical Exam   Constitutional: He is oriented to person, place, and time. He appears well-developed and well-nourished. No distress.   Cardiovascular: Normal rate and regular rhythm.    Pulmonary/Chest: Effort normal.   Musculoskeletal: He exhibits no edema.   Neurological: He is alert and oriented to person, place, and time.   Psychiatric: He has a normal mood and affect. His behavior is normal.   Vitals reviewed.      Health Maintenance       Date Due Completion Date    Hepatitis C Screening 1950 ---    TETANUS VACCINE 8/14/1970 ---    Zoster Vaccine 8/14/2012 ---    Influenza Vaccine 8/1/2017 10/10/2013 (Done)    Override on 10/10/2013: Done    Lipid Panel 1/23/2020 1/23/2015    Override on 1/10/2014: Done    Colonoscopy 1/10/2022 1/10/2012 (Done)    Override on 1/10/2012: Done          Assessment & Plan    1. Essential hypertension  This is well controlled on present medical regimen,  not experiencing any side effects.  Continue present medical regimen.    2.  Osteoarthritis.  Patient is status post multiple joint replacements.    3.  Renal insufficiency, post nephritis/acute renal failure.  He will continue to follow-up with nephrology.      No Follow-up on file.

## 2017-05-19 NOTE — MR AVS SNAPSHOT
[facility]  [address]  [place] [address]  Phone: [phone]  Fax: [phone]                  Christophe Renteria   2017 1:20 PM   Office Visit    Description:  Male : 1950   Provider:  Ehsan Small Jr., MD   Department:  [facility]           Reason for Visit     Hypertension           Diagnoses this Visit        Comments    Essential hypertension    -  Primary            To Do List           Goals (5 Years of Data)     None      [facility]     [facility] On Call Nurse Care Line -  Assistance  Unless otherwise directed by your provider, please contact [facility], our nurse care line that is available for  assistance.     Registered nurses in the [facility] provide: appointment scheduling, clinical advisement, health education, and other advisory services.  Call: [phone] (toll free)               Medications           Message regarding Medications     Verify the changes and/or additions to your medication regime listed below are the same as discussed with your clinician today.  If any of these changes or additions are incorrect, please notify your healthcare provider.        STOP taking these medications     ADALIMUMAB (HUMIRA PEN SUBQ) Inject 40 mg into the skin. EVERY 2 WEEKS    bisoprolol (ZEBETA) 10 MG tablet Take 1 tablet (10 mg total) by mouth once daily.    famotidine (PEPCID) 20 MG tablet Take 1 tablet (20 mg total) by mouth 2 (two) times daily.    magnesium oxide (MAG-OX) 400 mg tablet Take 400 mg by mouth once daily.    aspirin (ECOTRIN) 325 MG EC tablet Take 1 tablet (325 mg total) by mouth once daily.    bisacodyl (DULCOLAX) 10 mg Supp Place 1 suppository (10 mg total) rectally daily as needed (Until bowel movement if patient has no bowel movement for 2 days).    calcium citrate (CALCITRATE) 200 mg (950 mg) tablet Take 1 tablet by mouth once daily.    potassium citrate 15 mEq TbSR            Verify that the  below list of medications is an accurate representation of the medications you are currently taking.  If none reported, the list may be blank. If incorrect, please contact your healthcare provider. Carry this list with you in case of emergency.           Current Medications     multivitamin capsule Take 1 capsule by mouth once daily.    pravastatin (PRAVACHOL) 40 MG tablet Take 1 tablet (40 mg total) by mouth once daily.    predniSONE (DELTASONE) 20 MG tablet Take 20 mg by mouth once daily.    tamsulosin (FLOMAX) 0.4 mg Cp24     tramadol (ULTRAM) 50 mg tablet     amlodipine (NORVASC) 10 MG tablet Take 1 tablet (10 mg total) by mouth once daily.           Clinical Reference Information           Allergies as of 5/19/2017     No Known Allergies      Immunizations Administered on Date of Encounter - 5/19/2017     None      [facility] Sign-Up     Activating your [facility] account is as easy as 1-2-3!     1) Visit [email or web address], select Sign Up Now, enter this activation code and your date of birth, then select Next.  [email or web address]  Expires: 7/3/2017  1:55 PM      2) Create a username and password to use when you visit [facility] in the future and select a security question in case you lose your password and select Next.    3) Enter your e-mail address and click Sign Up!    Additional Information  If you have questions, please e-mail [email or web address] or call [phone] to talk to our [facility] staff. Remember, [facility] is NOT to be used for urgent needs. For medical emergencies, dial 911.         Language Assistance Services     ATTENTION: Language assistance services are available, free of charge. Please call [phone].      ATENCIÓN: Si habla español, tiene a wilkerson disposición servicios gratuitos de asistencia lingüística. Llame al [phone].     CHÚ Ý: N?u b?n nói Ti?ng Vi?t, có các d?ch v? h? tr? ngôn ng? mi?n phí dành cho b?n. G?i s? [phone].         [facility] complies with  applicable Federal civil rights laws and does not discriminate on the basis of race, color, national origin, age, disability, or sex.

## 2017-05-21 LAB
BACTERIA SPEC AEROBE CULT: NO GROWTH
BACTERIA SPEC ANAEROBE CULT: NORMAL

## 2017-06-13 ENCOUNTER — HOSPITAL ENCOUNTER (OUTPATIENT)
Dept: PREADMISSION TESTING | Facility: HOSPITAL | Age: 67
Discharge: HOME | End: 2017-06-13
Attending: ORTHOPAEDIC SURGERY
Payer: COMMERCIAL

## 2017-06-13 VITALS
DIASTOLIC BLOOD PRESSURE: 79 MMHG | BODY MASS INDEX: 29.19 KG/M2 | WEIGHT: 186 LBS | HEIGHT: 67 IN | TEMPERATURE: 99 F | SYSTOLIC BLOOD PRESSURE: 143 MMHG | OXYGEN SATURATION: 95 % | RESPIRATION RATE: 18 BRPM | HEART RATE: 98 BPM

## 2017-06-13 DIAGNOSIS — Z01.818 PRE-OP TESTING: Primary | ICD-10-CM

## 2017-06-13 LAB
ALBUMIN SERPL BCP-MCNC: 3.2 G/DL
ALP SERPL-CCNC: 69 U/L
ALT SERPL W/O P-5'-P-CCNC: 28 U/L
ANION GAP SERPL CALC-SCNC: 10 MMOL/L
APTT BLDCRRT: 29.2 SEC
AST SERPL-CCNC: 31 U/L
BACTERIA #/AREA URNS HPF: ABNORMAL /HPF
BASOPHILS # BLD AUTO: 0.03 K/UL
BASOPHILS NFR BLD: 0.4 %
BILIRUB SERPL-MCNC: 0.6 MG/DL
BILIRUB UR QL STRIP: NEGATIVE
BUN SERPL-MCNC: 26 MG/DL
CALCIUM SERPL-MCNC: 9.5 MG/DL
CHLORIDE SERPL-SCNC: 101 MMOL/L
CLARITY UR: CLEAR
CO2 SERPL-SCNC: 24 MMOL/L
COLOR UR: YELLOW
CREAT SERPL-MCNC: 2.3 MG/DL
DIFFERENTIAL METHOD: ABNORMAL
EOSINOPHIL # BLD AUTO: 0.1 K/UL
EOSINOPHIL NFR BLD: 0.9 %
ERYTHROCYTE [DISTWIDTH] IN BLOOD BY AUTOMATED COUNT: 15 %
EST. GFR  (AFRICAN AMERICAN): 33 ML/MIN/1.73 M^2
EST. GFR  (NON AFRICAN AMERICAN): 29 ML/MIN/1.73 M^2
GLUCOSE SERPL-MCNC: 116 MG/DL
GLUCOSE UR QL STRIP: NEGATIVE
HCT VFR BLD AUTO: 30.8 %
HGB BLD-MCNC: 10.4 G/DL
HGB UR QL STRIP: NEGATIVE
HYALINE CASTS #/AREA URNS LPF: 1 /LPF
INR PPP: 1.1
KETONES UR QL STRIP: NEGATIVE
LEUKOCYTE ESTERASE UR QL STRIP: NEGATIVE
LYMPHOCYTES # BLD AUTO: 0.9 K/UL
LYMPHOCYTES NFR BLD: 13.8 %
MCH RBC QN AUTO: 32.5 PG
MCHC RBC AUTO-ENTMCNC: 33.8 %
MCV RBC AUTO: 96 FL
MICROSCOPIC COMMENT: ABNORMAL
MONOCYTES # BLD AUTO: 1.1 K/UL
MONOCYTES NFR BLD: 16 %
NEUTROPHILS # BLD AUTO: 4.6 K/UL
NEUTROPHILS NFR BLD: 67.9 %
NITRITE UR QL STRIP: NEGATIVE
PH UR STRIP: 5 [PH] (ref 5–8)
PLATELET # BLD AUTO: 227 K/UL
PMV BLD AUTO: 9.9 FL
POTASSIUM SERPL-SCNC: 3.8 MMOL/L
PROT SERPL-MCNC: 6.4 G/DL
PROT UR QL STRIP: ABNORMAL
PROTHROMBIN TIME: 11.4 SEC
RBC # BLD AUTO: 3.2 M/UL
RBC #/AREA URNS HPF: 0 /HPF (ref 0–4)
SODIUM SERPL-SCNC: 135 MMOL/L
SP GR UR STRIP: 1.02 (ref 1–1.03)
URN SPEC COLLECT METH UR: ABNORMAL
UROBILINOGEN UR STRIP-ACNC: NEGATIVE EU/DL
WBC # BLD AUTO: 6.75 K/UL
WBC #/AREA URNS HPF: 0 /HPF (ref 0–5)

## 2017-06-13 PROCEDURE — 80053 COMPREHEN METABOLIC PANEL: CPT

## 2017-06-13 PROCEDURE — 85025 COMPLETE CBC W/AUTO DIFF WBC: CPT

## 2017-06-13 PROCEDURE — 85730 THROMBOPLASTIN TIME PARTIAL: CPT

## 2017-06-13 PROCEDURE — 36415 COLL VENOUS BLD VENIPUNCTURE: CPT

## 2017-06-13 PROCEDURE — 85610 PROTHROMBIN TIME: CPT

## 2017-06-13 PROCEDURE — 81000 URINALYSIS NONAUTO W/SCOPE: CPT

## 2017-06-13 NOTE — DISCHARGE INSTRUCTIONS
Your surgery is scheduled for__6/19/2017_______________.    Call [phone] between 2 pm and 5 pm __6/16/2017__________ to find out your arrival time for the day of surgery.    Report to SAME DAY SURGERY UNIT at _______am on the 2nd floor of the hospital.  Use the front entrance of the hospital before 6 am.  If you need wheelchair assistance, call [phone] from your cell phone,  or call 0 from the courtesy phone in the hospital lobby.    Important instructions:   Do not eat or drink after 12 midnight, including water.  It is okay to brush your teeth.  Do not have gum, candy or mints.     Take only these medications with a small swallow of water on the morning of your surgery.__amlodipine___________        Return to the hospital lab on __6/17/2017________for additional blood test.      For this procedure you will shower at home the night before and also the morning of surgery with HIBICLENS soap provided by the pre op nurse. Do not use this soap on your face or genitals. You will shower a third time here at the hospital on the morning of surgery. Rinse completely after each shower.  Please place clean linens on your bed the night before surgery. Please wear fresh clean clothing after each shower.  No shaving of procedural area at least 4-5 days before surgery due to    increased risk of skin irritation and/or possible infection.     You may wear deodorant only.      Do not wear powder, body lotion or cologne.     Do not wear any jewelry or have any metal on your body.     Please bring any documents given to you by your doctor.     If you are going home on the same day of surgery, you must have arrangements for a ride home.  You will not be able to drive home if you were given anesthesia or sedation.     Stop taking Aspirin, Ibuprofen, Motrin and Aleve at least 3-5 days before your surgery. You may use Tylenol.     Stop taking fish oil and vitamin E for least 5 days before surgery.     Wear loose fitting  clothes allowing for bandages.     Please leave money and valuables home.       You may bring your cell phone.     Call the doctor if fever or illness should occur before your surgery.    Call [phone] to contact us here at [facility] if needed.

## 2017-06-16 ENCOUNTER — OFFICE VISIT (OUTPATIENT)
Dept: SLEEP MEDICINE | Facility: CLINIC | Age: 67
DRG: 470 | End: 2017-06-16
Payer: COMMERCIAL

## 2017-06-16 VITALS
HEART RATE: 104 BPM | SYSTOLIC BLOOD PRESSURE: 150 MMHG | DIASTOLIC BLOOD PRESSURE: 80 MMHG | HEIGHT: 67 IN | OXYGEN SATURATION: 98 % | WEIGHT: 183.44 LBS | BODY MASS INDEX: 28.79 KG/M2

## 2017-06-16 DIAGNOSIS — G47.33 OSA (OBSTRUCTIVE SLEEP APNEA): Primary | ICD-10-CM

## 2017-06-16 PROCEDURE — 99213 OFFICE O/P EST LOW 20 MIN: CPT | Mod: S$GLB,,, | Performed by: NURSE PRACTITIONER

## 2017-06-16 PROCEDURE — 99999 PR PBB SHADOW E&M-EST. PATIENT-LVL III: CPT | Mod: PBBFAC,,, | Performed by: NURSE PRACTITIONER

## 2017-06-16 NOTE — PROGRESS NOTES
Christophe Renteria seen in follow-up for SAMANTHA management and CPAP equipment check after set up of CPAP. Last seen in clinic by Dr. Ibarra 2017. This is his initial visit with me.     2017 Dr. Ibarra This 66 y.o. male patient presents for the evaluation of possible obstructive sleep apnea. Referred by Dr. 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  Wife reports gasping for air in his sleep.  No report of disrupted.    ESS = 6    Nasal breathing - difficult, chronic sinuses, oral breathing    Prior ENT eval, had laser removal of uvula for snoring, years ago.    SLEEP ROUTINE:   Bed partner: wife   Time to bed: 10 pm   Sleep onset latency: 10-15 mins   Disruptions or awakenings: 2   Wakeup time: 6 am   Perceived sleep quality: good   Daytime naps: none    INTERVAL HISTORY:    2017 HAIDER Plata NP: Pt completed home sleep study and set up for auto CPAP since last clinic visit. Patient complaints of snoring and witnessed air gasping now resolved with CPAP use.  Denies oral/nasal drying. No air leaks with FFM. Feels refreshed in AM and no longer falls asleep immediately after dinner. Sleep initiation and maintenance improved with CPAP. ESS 0.     CPAP Interrogation: DreamStation Auto CPAP 6 - 18 Set up 2017. Therapy hours: 375.0, Blower hours: 382.7, Days > 4 hours: 30 / , 7-day average: 8.4 hours, Mask Fit: 99%, AHI (predicted): 4.8, Periodic breathin%, 90% pressure: 17.6,  Reviewed charts via [employer], increase max pressure to 20 today.     Baseline Sleep Study: 2017  lb. The overall AHI was 63 and overall RDI was 68. The oxygen tani was 64.9% and % time < 90% SpO2 was 12.9%.     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    Hypertension     Lumbar disc disease     Psoriatic arthritis     Renal disease        Past Surgical History:   Procedure Laterality Date    ANKLE SURGERY Right   "   BACK SURGERY      FRACTURE SURGERY      JOINT REPLACEMENT      Rt total hip    KNEE SURGERY  02/13/2017    ROTATOR CUFF REPAIR Right        Family History   Problem Relation Age of Onset    Cancer Mother     Hypertension Father     Stroke Father        Social History   Substance Use Topics    Smoking status: Former Smoker     Types: Pipe     Quit date: 1985    Smokeless tobacco: Never Used    Alcohol use 0.0 oz/week      Comment: occassional       ALLERGIES: Reviewed in EPIC    CURRENT MEDICATIONS: Reviewed in EPIC    REVIEW OF SYSTEMS:  Sleep related symptoms as per HPI;    Denies weight gain;    Positive sinus problems;    Positive dyspnea;    Denies palpitations;    Denies acid reflux;    Denies polyuria;    Denies headaches;    Denies mood disturbance;    Denies anemia;    Otherwise, a balance of systems reviewed is negative.    PHYSICAL EXAM:  BP (!) 150/80 (BP Location: Right arm, Patient Position: Sitting, BP Method: Manual) Comment: left hip pain, hip surgery Monday  Pulse 104   Ht 5' 7" (1.702 m)   Wt 83.2 kg (183 lb 6.8 oz)   SpO2 98%   BMI 28.73 kg/m²   GENERAL: Well groomed; Normally developed; Overweight  HEENT: Conjunctivae are non-erythematous; Pupils equal, round, and reactive to light;    Nose is symmetrical; Nasal mucosa is pink and moist; Septum is midline;    Turbinates are normal; Nasal airflow is normal, but + Mariann,  left nostril > right   Posterior pharynx is pink, lateral narrowed; Posterior palate is low; Modified Mallampati: IV   Uvula is normal; Tongue is normal; Tonsils +1;    Dentition is fair; No TMJ tenderness;    Jaw opening and protrusion without click and without discomfort.  NECK: Supple. No thyromegaly. No palpable nodes. Neck circumference in inches is 16.75  SKIN: On face and neck: No abrasions, no rashes, no lesions.     No subcutaneous nodules are palpable.  RESPIRATORY: Chest is clear to auscultation.     Normal chest expansion and non-labored breathing " at rest.  CARDIOVASCULAR: Normal S1, S2.  No murmurs, gallops or rubs.   No carotid bruits bilaterally.  EXTREMITIES: No clubbing. No cyanosis. Edema is absent.   NEURO: Oriented to time, place and person.    Normal attention span and concentration.  Station normal. Gait normal.  PSYCH: Affect is full. Mood is normal.      ASSESSMENT:    Obstructive sleep apnea, severe by AHI. The patient symptomatically has snoring and witnessed air gasping  now resolved with CPAP use. The patient is adherent on CPAP and experiencing symptomatic benefit. Medical co-morbidities of hypertension. This warrants further investigation for possible obstructive sleep apnea.    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  We discussed potential treatment options, which could include weight loss, body positioning, use of an oral appliance, continuous positive airway pressure (CPAP), EPAP, or referral for surgical consideration.    Treatment: Continue auto CPAP 10 - 20 cm. Will review data on [employer] and call patient to check in 3 weeks. Pt notify clinic if any pressure intolerance. RTC 12 months  for adherence monitoring.     Precautions: The patient was advised to abstain from driving should they feel sleepy or drowsy.

## 2017-06-19 ENCOUNTER — ANESTHESIA EVENT (OUTPATIENT)
Dept: SURGERY | Facility: HOSPITAL | Age: 67
DRG: 470 | End: 2017-06-19
Payer: COMMERCIAL

## 2017-06-19 ENCOUNTER — ANESTHESIA (OUTPATIENT)
Dept: SURGERY | Facility: HOSPITAL | Age: 67
DRG: 470 | End: 2017-06-19
Payer: COMMERCIAL

## 2017-06-19 ENCOUNTER — SURGERY (OUTPATIENT)
Age: 67
End: 2017-06-19

## 2017-06-19 ENCOUNTER — HOSPITAL ENCOUNTER (INPATIENT)
Facility: HOSPITAL | Age: 67
LOS: 2 days | Discharge: HOME HEALTH | DRG: 470 | End: 2017-06-21
Attending: ORTHOPAEDIC SURGERY | Admitting: ORTHOPAEDIC SURGERY
Payer: COMMERCIAL

## 2017-06-19 DIAGNOSIS — E78.5 HYPERLIPIDEMIA: ICD-10-CM

## 2017-06-19 DIAGNOSIS — M16.12 OSTEOARTHRITIS OF LEFT HIP, UNSPECIFIED OSTEOARTHRITIS TYPE: Primary | ICD-10-CM

## 2017-06-19 LAB
ABO + RH BLD: NORMAL
ANION GAP SERPL CALC-SCNC: 9 MMOL/L
BASOPHILS # BLD AUTO: 0.04 K/UL
BASOPHILS NFR BLD: 0.4 %
BLD GP AB SCN CELLS X3 SERPL QL: NORMAL
BUN SERPL-MCNC: 24 MG/DL
CALCIUM SERPL-MCNC: 9 MG/DL
CHLORIDE SERPL-SCNC: 103 MMOL/L
CO2 SERPL-SCNC: 21 MMOL/L
CREAT SERPL-MCNC: 2.2 MG/DL
DIFFERENTIAL METHOD: ABNORMAL
EOSINOPHIL # BLD AUTO: 0.1 K/UL
EOSINOPHIL NFR BLD: 1.2 %
ERYTHROCYTE [DISTWIDTH] IN BLOOD BY AUTOMATED COUNT: 14.7 %
EST. GFR  (AFRICAN AMERICAN): 35 ML/MIN/1.73 M^2
EST. GFR  (NON AFRICAN AMERICAN): 31 ML/MIN/1.73 M^2
FUNGUS SPEC CULT: NORMAL
GLUCOSE SERPL-MCNC: 271 MG/DL
GRAM STN SPEC: NORMAL
GRAM STN SPEC: NORMAL
HCT VFR BLD AUTO: 29.2 %
HGB BLD-MCNC: 9.5 G/DL
LYMPHOCYTES # BLD AUTO: 1.8 K/UL
LYMPHOCYTES NFR BLD: 18.7 %
MCH RBC QN AUTO: 31.6 PG
MCHC RBC AUTO-ENTMCNC: 32.5 %
MCV RBC AUTO: 97 FL
MONOCYTES # BLD AUTO: 1 K/UL
MONOCYTES NFR BLD: 9.8 %
NEUTROPHILS # BLD AUTO: 6.8 K/UL
NEUTROPHILS NFR BLD: 69.9 %
PLATELET # BLD AUTO: 245 K/UL
PMV BLD AUTO: 9.5 FL
POTASSIUM SERPL-SCNC: 4.2 MMOL/L
RBC # BLD AUTO: 3.01 M/UL
SODIUM SERPL-SCNC: 133 MMOL/L
WBC # BLD AUTO: 9.67 K/UL

## 2017-06-19 PROCEDURE — 25000003 PHARM REV CODE 250: Performed by: ORTHOPAEDIC SURGERY

## 2017-06-19 PROCEDURE — G8978 MOBILITY CURRENT STATUS: HCPCS | Mod: CJ

## 2017-06-19 PROCEDURE — 25000003 PHARM REV CODE 250: Performed by: NURSE ANESTHETIST, CERTIFIED REGISTERED

## 2017-06-19 PROCEDURE — 88305 TISSUE EXAM BY PATHOLOGIST: CPT | Mod: 26,,, | Performed by: PATHOLOGY

## 2017-06-19 PROCEDURE — 87070 CULTURE OTHR SPECIMN AEROBIC: CPT

## 2017-06-19 PROCEDURE — 36000710: Performed by: ORTHOPAEDIC SURGERY

## 2017-06-19 PROCEDURE — 36415 COLL VENOUS BLD VENIPUNCTURE: CPT

## 2017-06-19 PROCEDURE — 94640 AIRWAY INHALATION TREATMENT: CPT

## 2017-06-19 PROCEDURE — 63600175 PHARM REV CODE 636 W HCPCS: Performed by: NURSE ANESTHETIST, CERTIFIED REGISTERED

## 2017-06-19 PROCEDURE — 97161 PT EVAL LOW COMPLEX 20 MIN: CPT

## 2017-06-19 PROCEDURE — 36000711: Performed by: ORTHOPAEDIC SURGERY

## 2017-06-19 PROCEDURE — 87205 SMEAR GRAM STAIN: CPT

## 2017-06-19 PROCEDURE — 88305 TISSUE EXAM BY PATHOLOGIST: CPT | Performed by: PATHOLOGY

## 2017-06-19 PROCEDURE — 11000001 HC ACUTE MED/SURG PRIVATE ROOM

## 2017-06-19 PROCEDURE — 88342 IMHCHEM/IMCYTCHM 1ST ANTB: CPT | Mod: 26,,, | Performed by: PATHOLOGY

## 2017-06-19 PROCEDURE — 37000009 HC ANESTHESIA EA ADD 15 MINS: Performed by: ORTHOPAEDIC SURGERY

## 2017-06-19 PROCEDURE — 63600175 PHARM REV CODE 636 W HCPCS: Performed by: ORTHOPAEDIC SURGERY

## 2017-06-19 PROCEDURE — D9220A PRA ANESTHESIA: Mod: ANES,,, | Performed by: ANESTHESIOLOGY

## 2017-06-19 PROCEDURE — 87075 CULTR BACTERIA EXCEPT BLOOD: CPT

## 2017-06-19 PROCEDURE — 86901 BLOOD TYPING SEROLOGIC RH(D): CPT

## 2017-06-19 PROCEDURE — 63600175 PHARM REV CODE 636 W HCPCS: Performed by: ANESTHESIOLOGY

## 2017-06-19 PROCEDURE — G8979 MOBILITY GOAL STATUS: HCPCS | Mod: CI

## 2017-06-19 PROCEDURE — 85025 COMPLETE CBC W/AUTO DIFF WBC: CPT

## 2017-06-19 PROCEDURE — 80048 BASIC METABOLIC PNL TOTAL CA: CPT

## 2017-06-19 PROCEDURE — C1776 JOINT DEVICE (IMPLANTABLE): HCPCS | Performed by: ORTHOPAEDIC SURGERY

## 2017-06-19 PROCEDURE — 37000008 HC ANESTHESIA 1ST 15 MINUTES: Performed by: ORTHOPAEDIC SURGERY

## 2017-06-19 PROCEDURE — D9220A PRA ANESTHESIA: Mod: CRNA,,, | Performed by: NURSE ANESTHETIST, CERTIFIED REGISTERED

## 2017-06-19 PROCEDURE — 0SRB0JA REPLACEMENT OF LEFT HIP JOINT WITH SYNTHETIC SUBSTITUTE, UNCEMENTED, OPEN APPROACH: ICD-10-PCS | Performed by: ORTHOPAEDIC SURGERY

## 2017-06-19 PROCEDURE — 25000242 PHARM REV CODE 250 ALT 637 W/ HCPCS: Performed by: INTERNAL MEDICINE

## 2017-06-19 PROCEDURE — 86900 BLOOD TYPING SEROLOGIC ABO: CPT

## 2017-06-19 PROCEDURE — 27201423 OPTIME MED/SURG SUP & DEVICES STERILE SUPPLY: Performed by: ORTHOPAEDIC SURGERY

## 2017-06-19 PROCEDURE — 71000039 HC RECOVERY, EACH ADD'L HOUR: Performed by: ORTHOPAEDIC SURGERY

## 2017-06-19 PROCEDURE — 71000033 HC RECOVERY, INTIAL HOUR: Performed by: ORTHOPAEDIC SURGERY

## 2017-06-19 PROCEDURE — 88311 DECALCIFY TISSUE: CPT | Mod: 26,,, | Performed by: PATHOLOGY

## 2017-06-19 DEVICE — SHELL 3HOLE 50MM G7 OSSEOTI: Type: IMPLANTABLE DEVICE | Site: HIP | Status: FUNCTIONAL

## 2017-06-19 DEVICE — IMPLANTABLE DEVICE: Type: IMPLANTABLE DEVICE | Site: HIP | Status: FUNCTIONAL

## 2017-06-19 RX ORDER — CEFUROXIME SODIUM 750 MG/1
INJECTION, POWDER, FOR SOLUTION INTRAMUSCULAR; INTRAVENOUS
Status: DISCONTINUED | OUTPATIENT
Start: 2017-06-19 | End: 2017-06-19 | Stop reason: HOSPADM

## 2017-06-19 RX ORDER — HYDROMORPHONE HYDROCHLORIDE 2 MG/ML
1 INJECTION, SOLUTION INTRAMUSCULAR; INTRAVENOUS; SUBCUTANEOUS
Status: DISCONTINUED | OUTPATIENT
Start: 2017-06-19 | End: 2017-06-19

## 2017-06-19 RX ORDER — GLYCOPYRROLATE 0.2 MG/ML
INJECTION INTRAMUSCULAR; INTRAVENOUS
Status: DISCONTINUED | OUTPATIENT
Start: 2017-06-19 | End: 2017-06-19

## 2017-06-19 RX ORDER — DEXAMETHASONE SODIUM PHOSPHATE 4 MG/ML
INJECTION, SOLUTION INTRA-ARTICULAR; INTRALESIONAL; INTRAMUSCULAR; INTRAVENOUS; SOFT TISSUE
Status: DISCONTINUED | OUTPATIENT
Start: 2017-06-19 | End: 2017-06-19

## 2017-06-19 RX ORDER — CEFAZOLIN SODIUM 2 G/50ML
2 SOLUTION INTRAVENOUS
Status: DISCONTINUED | OUTPATIENT
Start: 2017-06-19 | End: 2017-06-19 | Stop reason: HOSPADM

## 2017-06-19 RX ORDER — MIDAZOLAM HYDROCHLORIDE 1 MG/ML
INJECTION, SOLUTION INTRAMUSCULAR; INTRAVENOUS
Status: DISCONTINUED | OUTPATIENT
Start: 2017-06-19 | End: 2017-06-19

## 2017-06-19 RX ORDER — BISACODYL 10 MG
10 SUPPOSITORY, RECTAL RECTAL DAILY PRN
Status: DISCONTINUED | OUTPATIENT
Start: 2017-06-19 | End: 2017-06-21 | Stop reason: HOSPADM

## 2017-06-19 RX ORDER — HYDROMORPHONE HYDROCHLORIDE 2 MG/ML
INJECTION, SOLUTION INTRAMUSCULAR; INTRAVENOUS; SUBCUTANEOUS
Status: DISCONTINUED | OUTPATIENT
Start: 2017-06-19 | End: 2017-06-19

## 2017-06-19 RX ORDER — ACETAMINOPHEN 10 MG/ML
INJECTION, SOLUTION INTRAVENOUS
Status: DISCONTINUED | OUTPATIENT
Start: 2017-06-19 | End: 2017-06-19

## 2017-06-19 RX ORDER — HYDROMORPHONE HYDROCHLORIDE 2 MG/ML
0.2 INJECTION, SOLUTION INTRAMUSCULAR; INTRAVENOUS; SUBCUTANEOUS EVERY 5 MIN PRN
Status: DISCONTINUED | OUTPATIENT
Start: 2017-06-19 | End: 2017-06-19

## 2017-06-19 RX ORDER — SODIUM CHLORIDE 0.9 % (FLUSH) 0.9 %
3 SYRINGE (ML) INJECTION
Status: DISCONTINUED | OUTPATIENT
Start: 2017-06-19 | End: 2017-06-19 | Stop reason: HOSPADM

## 2017-06-19 RX ORDER — LORAZEPAM 2 MG/ML
0.25 INJECTION INTRAMUSCULAR ONCE AS NEEDED
Status: DISCONTINUED | OUTPATIENT
Start: 2017-06-19 | End: 2017-06-19 | Stop reason: HOSPADM

## 2017-06-19 RX ORDER — ACETAMINOPHEN 10 MG/ML
1000 INJECTION, SOLUTION INTRAVENOUS EVERY 8 HOURS
Status: COMPLETED | OUTPATIENT
Start: 2017-06-19 | End: 2017-06-20

## 2017-06-19 RX ORDER — TRANEXAMIC ACID 100 MG/ML
INJECTION, SOLUTION INTRAVENOUS
Status: DISCONTINUED | OUTPATIENT
Start: 2017-06-19 | End: 2017-06-19 | Stop reason: HOSPADM

## 2017-06-19 RX ORDER — FENTANYL CITRATE 50 UG/ML
INJECTION, SOLUTION INTRAMUSCULAR; INTRAVENOUS
Status: DISCONTINUED | OUTPATIENT
Start: 2017-06-19 | End: 2017-06-19

## 2017-06-19 RX ORDER — TAMSULOSIN HYDROCHLORIDE 0.4 MG/1
0.4 CAPSULE ORAL DAILY
Status: DISCONTINUED | OUTPATIENT
Start: 2017-06-19 | End: 2017-06-21 | Stop reason: HOSPADM

## 2017-06-19 RX ORDER — METHYLPREDNISOLONE ACETATE 40 MG/ML
INJECTION, SUSPENSION INTRA-ARTICULAR; INTRALESIONAL; INTRAMUSCULAR; SOFT TISSUE
Status: DISCONTINUED | OUTPATIENT
Start: 2017-06-19 | End: 2017-06-19 | Stop reason: HOSPADM

## 2017-06-19 RX ORDER — SODIUM CHLORIDE 0.9 % (FLUSH) 0.9 %
3 SYRINGE (ML) INJECTION EVERY 8 HOURS
Status: DISCONTINUED | OUTPATIENT
Start: 2017-06-19 | End: 2017-06-19 | Stop reason: HOSPADM

## 2017-06-19 RX ORDER — FENTANYL CITRATE 50 UG/ML
25 INJECTION, SOLUTION INTRAMUSCULAR; INTRAVENOUS EVERY 5 MIN PRN
Status: DISCONTINUED | OUTPATIENT
Start: 2017-06-19 | End: 2017-06-19 | Stop reason: HOSPADM

## 2017-06-19 RX ORDER — FAMOTIDINE 20 MG/1
20 TABLET, FILM COATED ORAL 2 TIMES DAILY
Status: DISCONTINUED | OUTPATIENT
Start: 2017-06-19 | End: 2017-06-21 | Stop reason: HOSPADM

## 2017-06-19 RX ORDER — IPRATROPIUM BROMIDE AND ALBUTEROL SULFATE 2.5; .5 MG/3ML; MG/3ML
3 SOLUTION RESPIRATORY (INHALATION) EVERY 6 HOURS
Status: DISCONTINUED | OUTPATIENT
Start: 2017-06-19 | End: 2017-06-21 | Stop reason: HOSPADM

## 2017-06-19 RX ORDER — TRANEXAMIC ACID 100 MG/ML
1500 INJECTION, SOLUTION INTRAVENOUS ONCE
Status: DISCONTINUED | OUTPATIENT
Start: 2017-06-19 | End: 2017-06-19

## 2017-06-19 RX ORDER — CEFAZOLIN SODIUM 1 G/3ML
INJECTION, POWDER, FOR SOLUTION INTRAMUSCULAR; INTRAVENOUS
Status: DISCONTINUED | OUTPATIENT
Start: 2017-06-19 | End: 2017-06-19

## 2017-06-19 RX ORDER — MORPHINE SULFATE 10 MG/ML
2 INJECTION INTRAMUSCULAR; INTRAVENOUS; SUBCUTANEOUS
Status: DISCONTINUED | OUTPATIENT
Start: 2017-06-19 | End: 2017-06-21 | Stop reason: HOSPADM

## 2017-06-19 RX ORDER — CELECOXIB 100 MG/1
400 CAPSULE ORAL
Status: DISCONTINUED | OUTPATIENT
Start: 2017-06-19 | End: 2017-06-19

## 2017-06-19 RX ORDER — NEOSTIGMINE METHYLSULFATE 1 MG/ML
INJECTION, SOLUTION INTRAVENOUS
Status: DISCONTINUED | OUTPATIENT
Start: 2017-06-19 | End: 2017-06-19

## 2017-06-19 RX ORDER — AMLODIPINE BESYLATE 5 MG/1
10 TABLET ORAL DAILY
Status: DISCONTINUED | OUTPATIENT
Start: 2017-06-19 | End: 2017-06-21 | Stop reason: HOSPADM

## 2017-06-19 RX ORDER — PROPOFOL 10 MG/ML
VIAL (ML) INTRAVENOUS
Status: DISCONTINUED | OUTPATIENT
Start: 2017-06-19 | End: 2017-06-19

## 2017-06-19 RX ORDER — SODIUM CHLORIDE, SODIUM LACTATE, POTASSIUM CHLORIDE, CALCIUM CHLORIDE 600; 310; 30; 20 MG/100ML; MG/100ML; MG/100ML; MG/100ML
INJECTION, SOLUTION INTRAVENOUS CONTINUOUS PRN
Status: DISCONTINUED | OUTPATIENT
Start: 2017-06-19 | End: 2017-06-19

## 2017-06-19 RX ORDER — IPRATROPIUM BROMIDE AND ALBUTEROL SULFATE 2.5; .5 MG/3ML; MG/3ML
3 SOLUTION RESPIRATORY (INHALATION)
Status: DISCONTINUED | OUTPATIENT
Start: 2017-06-19 | End: 2017-06-19

## 2017-06-19 RX ORDER — IPRATROPIUM BROMIDE AND ALBUTEROL SULFATE 2.5; .5 MG/3ML; MG/3ML
3 SOLUTION RESPIRATORY (INHALATION) EVERY 6 HOURS
Status: DISCONTINUED | OUTPATIENT
Start: 2017-06-19 | End: 2017-06-19

## 2017-06-19 RX ORDER — ONDANSETRON 2 MG/ML
INJECTION INTRAMUSCULAR; INTRAVENOUS
Status: DISCONTINUED | OUTPATIENT
Start: 2017-06-19 | End: 2017-06-19

## 2017-06-19 RX ORDER — SODIUM CHLORIDE, SODIUM LACTATE, POTASSIUM CHLORIDE, CALCIUM CHLORIDE 600; 310; 30; 20 MG/100ML; MG/100ML; MG/100ML; MG/100ML
INJECTION, SOLUTION INTRAVENOUS CONTINUOUS
Status: DISCONTINUED | OUTPATIENT
Start: 2017-06-19 | End: 2017-06-20

## 2017-06-19 RX ORDER — ROCURONIUM BROMIDE 10 MG/ML
INJECTION, SOLUTION INTRAVENOUS
Status: DISCONTINUED | OUTPATIENT
Start: 2017-06-19 | End: 2017-06-19

## 2017-06-19 RX ORDER — FENTANYL CITRATE 50 UG/ML
25 INJECTION, SOLUTION INTRAMUSCULAR; INTRAVENOUS EVERY 5 MIN PRN
Status: DISCONTINUED | OUTPATIENT
Start: 2017-06-19 | End: 2017-06-19

## 2017-06-19 RX ORDER — FAMOTIDINE 10 MG/ML
INJECTION INTRAVENOUS
Status: DISCONTINUED | OUTPATIENT
Start: 2017-06-19 | End: 2017-06-19

## 2017-06-19 RX ORDER — ACETAMINOPHEN 10 MG/ML
1000 INJECTION, SOLUTION INTRAVENOUS EVERY 8 HOURS
Status: DISCONTINUED | OUTPATIENT
Start: 2017-06-19 | End: 2017-06-19

## 2017-06-19 RX ORDER — LIDOCAINE HCL/PF 100 MG/5ML
SYRINGE (ML) INTRAVENOUS
Status: DISCONTINUED | OUTPATIENT
Start: 2017-06-19 | End: 2017-06-19

## 2017-06-19 RX ORDER — ZOLPIDEM TARTRATE 5 MG/1
5 TABLET ORAL NIGHTLY PRN
Status: DISCONTINUED | OUTPATIENT
Start: 2017-06-19 | End: 2017-06-21 | Stop reason: HOSPADM

## 2017-06-19 RX ORDER — PRAVASTATIN SODIUM 40 MG/1
40 TABLET ORAL DAILY
Status: DISCONTINUED | OUTPATIENT
Start: 2017-06-19 | End: 2017-06-21 | Stop reason: HOSPADM

## 2017-06-19 RX ORDER — GABAPENTIN 400 MG/1
1200 CAPSULE ORAL
Status: COMPLETED | OUTPATIENT
Start: 2017-06-19 | End: 2017-06-19

## 2017-06-19 RX ORDER — SUCCINYLCHOLINE CHLORIDE 20 MG/ML
INJECTION INTRAMUSCULAR; INTRAVENOUS
Status: DISCONTINUED | OUTPATIENT
Start: 2017-06-19 | End: 2017-06-19

## 2017-06-19 RX ORDER — CEFAZOLIN SODIUM 2 G/50ML
2 SOLUTION INTRAVENOUS
Status: COMPLETED | OUTPATIENT
Start: 2017-06-19 | End: 2017-06-20

## 2017-06-19 RX ORDER — TAMSULOSIN HYDROCHLORIDE 0.4 MG/1
0.4 CAPSULE ORAL ONCE
Status: COMPLETED | OUTPATIENT
Start: 2017-06-19 | End: 2017-06-19

## 2017-06-19 RX ORDER — OXYCODONE HYDROCHLORIDE 5 MG/1
10 TABLET ORAL EVERY 4 HOURS PRN
Status: DISCONTINUED | OUTPATIENT
Start: 2017-06-19 | End: 2017-06-20

## 2017-06-19 RX ORDER — ONDANSETRON 2 MG/ML
4 INJECTION INTRAMUSCULAR; INTRAVENOUS EVERY 12 HOURS PRN
Status: DISCONTINUED | OUTPATIENT
Start: 2017-06-19 | End: 2017-06-21 | Stop reason: HOSPADM

## 2017-06-19 RX ORDER — METOCLOPRAMIDE HYDROCHLORIDE 5 MG/ML
INJECTION INTRAMUSCULAR; INTRAVENOUS
Status: DISCONTINUED | OUTPATIENT
Start: 2017-06-19 | End: 2017-06-19

## 2017-06-19 RX ORDER — LORAZEPAM 2 MG/ML
0.25 INJECTION INTRAMUSCULAR ONCE AS NEEDED
Status: DISCONTINUED | OUTPATIENT
Start: 2017-06-19 | End: 2017-06-19

## 2017-06-19 RX ORDER — GENTAMICIN SULFATE 40 MG/ML
INJECTION, SOLUTION INTRAMUSCULAR; INTRAVENOUS
Status: DISCONTINUED | OUTPATIENT
Start: 2017-06-19 | End: 2017-06-19 | Stop reason: HOSPADM

## 2017-06-19 RX ORDER — CEFAZOLIN SODIUM 2 G/50ML
2 SOLUTION INTRAVENOUS
Status: DISCONTINUED | OUTPATIENT
Start: 2017-06-19 | End: 2017-06-19

## 2017-06-19 RX ORDER — DOCUSATE SODIUM 100 MG/1
100 CAPSULE, LIQUID FILLED ORAL EVERY 12 HOURS
Status: DISCONTINUED | OUTPATIENT
Start: 2017-06-19 | End: 2017-06-21 | Stop reason: HOSPADM

## 2017-06-19 RX ORDER — BUPIVACAINE HYDROCHLORIDE 2.5 MG/ML
INJECTION, SOLUTION INFILTRATION; PERINEURAL
Status: DISCONTINUED | OUTPATIENT
Start: 2017-06-19 | End: 2017-06-19 | Stop reason: HOSPADM

## 2017-06-19 RX ORDER — MUPIROCIN 20 MG/G
1 OINTMENT TOPICAL 2 TIMES DAILY
Status: DISCONTINUED | OUTPATIENT
Start: 2017-06-19 | End: 2017-06-21 | Stop reason: HOSPADM

## 2017-06-19 RX ADMIN — GLYCOPYRROLATE 0.4 MG: 0.2 INJECTION, SOLUTION INTRAMUSCULAR; INTRAVENOUS at 09:06

## 2017-06-19 RX ADMIN — IPRATROPIUM BROMIDE AND ALBUTEROL SULFATE 3 ML: .5; 3 SOLUTION RESPIRATORY (INHALATION) at 06:06

## 2017-06-19 RX ADMIN — METHYLPREDNISOLONE ACETATE 40 MG: 40 INJECTION, SUSPENSION INTRA-ARTICULAR; INTRALESIONAL; INTRAMUSCULAR; SOFT TISSUE at 08:06

## 2017-06-19 RX ADMIN — TAMSULOSIN HYDROCHLORIDE 0.4 MG: 0.4 CAPSULE ORAL at 01:06

## 2017-06-19 RX ADMIN — CEFUROXIME 750 MG: 750 INJECTION, POWDER, FOR SOLUTION INTRAMUSCULAR; INTRAVENOUS at 08:06

## 2017-06-19 RX ADMIN — ACETAMINOPHEN 1000 MG: 10 INJECTION, SOLUTION INTRAVENOUS at 09:06

## 2017-06-19 RX ADMIN — HYDROMORPHONE HYDROCHLORIDE 0.4 MG: 2 INJECTION INTRAMUSCULAR; INTRAVENOUS; SUBCUTANEOUS at 09:06

## 2017-06-19 RX ADMIN — SODIUM CHLORIDE, SODIUM LACTATE, POTASSIUM CHLORIDE, AND CALCIUM CHLORIDE: .6; .31; .03; .02 INJECTION, SOLUTION INTRAVENOUS at 02:06

## 2017-06-19 RX ADMIN — MUPIROCIN 1 G: 20 OINTMENT TOPICAL at 01:06

## 2017-06-19 RX ADMIN — SODIUM CHLORIDE, SODIUM LACTATE, POTASSIUM CHLORIDE, AND CALCIUM CHLORIDE: .6; .31; .03; .02 INJECTION, SOLUTION INTRAVENOUS at 06:06

## 2017-06-19 RX ADMIN — MIDAZOLAM HYDROCHLORIDE 2 MG: 1 INJECTION, SOLUTION INTRAMUSCULAR; INTRAVENOUS at 07:06

## 2017-06-19 RX ADMIN — CEFAZOLIN SODIUM 2 G: 2 SOLUTION INTRAVENOUS at 02:06

## 2017-06-19 RX ADMIN — THERA TABS 1 TABLET: TAB at 01:06

## 2017-06-19 RX ADMIN — ACETAMINOPHEN 1000 MG: 10 INJECTION, SOLUTION INTRAVENOUS at 04:06

## 2017-06-19 RX ADMIN — FAMOTIDINE 20 MG: 20 TABLET, FILM COATED ORAL at 01:06

## 2017-06-19 RX ADMIN — GENTAMICIN SULFATE 480 MG: 40 INJECTION, SOLUTION INTRAMUSCULAR; INTRAVENOUS at 08:06

## 2017-06-19 RX ADMIN — TRANEXAMIC ACID 750 MG: 100 INJECTION, SOLUTION INTRAVENOUS at 09:06

## 2017-06-19 RX ADMIN — FENTANYL CITRATE 50 MCG: 50 INJECTION INTRAMUSCULAR; INTRAVENOUS at 07:06

## 2017-06-19 RX ADMIN — FENTANYL CITRATE 25 MCG: 50 INJECTION, SOLUTION INTRAMUSCULAR; INTRAVENOUS at 10:06

## 2017-06-19 RX ADMIN — PRAVASTATIN SODIUM 40 MG: 40 TABLET ORAL at 01:06

## 2017-06-19 RX ADMIN — METOCLOPRAMIDE 10 MG: 5 INJECTION, SOLUTION INTRAMUSCULAR; INTRAVENOUS at 07:06

## 2017-06-19 RX ADMIN — ROCURONIUM BROMIDE 15 MG: 10 INJECTION, SOLUTION INTRAVENOUS at 08:06

## 2017-06-19 RX ADMIN — LIDOCAINE HYDROCHLORIDE 100 MG: 20 INJECTION, SOLUTION INTRAVENOUS at 07:06

## 2017-06-19 RX ADMIN — FAMOTIDINE 20 MG: 10 INJECTION, SOLUTION INTRAVENOUS at 08:06

## 2017-06-19 RX ADMIN — SUCCINYLCHOLINE CHLORIDE 120 MG: 20 INJECTION, SOLUTION INTRAMUSCULAR; INTRAVENOUS at 07:06

## 2017-06-19 RX ADMIN — CEFAZOLIN 1 G: 1 INJECTION, POWDER, FOR SOLUTION INTRAVENOUS at 07:06

## 2017-06-19 RX ADMIN — DEXAMETHASONE SODIUM PHOSPHATE 4 MG: 4 INJECTION, SOLUTION INTRAMUSCULAR; INTRAVENOUS at 09:06

## 2017-06-19 RX ADMIN — DOCUSATE SODIUM 100 MG: 100 CAPSULE, LIQUID FILLED ORAL at 01:06

## 2017-06-19 RX ADMIN — SODIUM CHLORIDE, SODIUM LACTATE, POTASSIUM CHLORIDE, AND CALCIUM CHLORIDE: .6; .31; .03; .02 INJECTION, SOLUTION INTRAVENOUS at 04:06

## 2017-06-19 RX ADMIN — GLYCOPYRROLATE 0.2 MG: 0.2 INJECTION, SOLUTION INTRAMUSCULAR; INTRAVENOUS at 07:06

## 2017-06-19 RX ADMIN — NEOSTIGMINE METHYLSULFATE 3.5 MG: 1 INJECTION INTRAVENOUS at 09:06

## 2017-06-19 RX ADMIN — BUPIVACAINE HYDROCHLORIDE 20 ML: 2.5 INJECTION, SOLUTION INFILTRATION; PERINEURAL at 08:06

## 2017-06-19 RX ADMIN — FAMOTIDINE 20 MG: 20 TABLET, FILM COATED ORAL at 09:06

## 2017-06-19 RX ADMIN — TAMSULOSIN HYDROCHLORIDE 0.4 MG: 0.4 CAPSULE ORAL at 05:06

## 2017-06-19 RX ADMIN — TRANEXAMIC ACID 2000 MG: 100 INJECTION, SOLUTION INTRAVENOUS at 08:06

## 2017-06-19 RX ADMIN — TRANEXAMIC ACID 750 MG: 100 INJECTION, SOLUTION INTRAVENOUS at 07:06

## 2017-06-19 RX ADMIN — MUPIROCIN 1 G: 20 OINTMENT TOPICAL at 09:06

## 2017-06-19 RX ADMIN — PROPOFOL 140 MG: 10 INJECTION, EMULSION INTRAVENOUS at 07:06

## 2017-06-19 RX ADMIN — ROCURONIUM BROMIDE 10 MG: 10 INJECTION, SOLUTION INTRAVENOUS at 07:06

## 2017-06-19 RX ADMIN — DOCUSATE SODIUM 100 MG: 100 CAPSULE, LIQUID FILLED ORAL at 09:06

## 2017-06-19 RX ADMIN — ROCURONIUM BROMIDE 20 MG: 10 INJECTION, SOLUTION INTRAVENOUS at 07:06

## 2017-06-19 RX ADMIN — BUPIVACAINE 20 ML: 13.3 INJECTION, SUSPENSION, LIPOSOMAL INFILTRATION at 08:06

## 2017-06-19 RX ADMIN — ONDANSETRON 4 MG: 2 INJECTION, SOLUTION INTRAMUSCULAR; INTRAVENOUS at 09:06

## 2017-06-19 RX ADMIN — OXYCODONE HYDROCHLORIDE 10 MG: 5 TABLET ORAL at 09:06

## 2017-06-19 RX ADMIN — GABAPENTIN 1200 MG: 400 CAPSULE ORAL at 06:06

## 2017-06-19 RX ADMIN — OXYCODONE HYDROCHLORIDE 10 MG: 5 TABLET ORAL at 03:06

## 2017-06-19 RX ADMIN — CEFAZOLIN SODIUM 2 G: 2 SOLUTION INTRAVENOUS at 07:06

## 2017-06-19 NOTE — TRANSFER OF CARE
"Anesthesia Transfer of Care Note    Patient: Christophe Renteria    Procedure(s) Performed: Procedure(s) (LRB):  ARTHROPLASTY-HIP (ANTERIOR APPROACH) (Left)    Patient location: PACU    Anesthesia Type: general    Transport from OR: Transported from OR on room air with adequate spontaneous ventilation. Upon arrival to PACU/ICU, patient attached to 100% O2 by T-piece with adequate spontaneous ventilation    Post pain: adequate analgesia    Post assessment: no apparent anesthetic complications    Post vital signs: stable    Level of consciousness: sedated    Nausea/Vomiting: no nausea/vomiting    Complications: none    Transfer of care protocol was followed      Last vitals:   Visit Vitals  /63 (BP Location: Right arm, Patient Position: Lying, BP Method: Automatic)   Pulse 78   Temp 36.8 °C (98.2 °F) (Oral)   Resp 14   Ht 5' 7" (1.702 m)   Wt 84.4 kg (186 lb)   SpO2 96%   BMI 29.13 kg/m²     "

## 2017-06-19 NOTE — ANESTHESIA PREPROCEDURE EVALUATION
06/19/2017  Christophe Renteria is a 66 y.o., male.    Anesthesia Evaluation    I have reviewed the Patient Summary Reports.    I have reviewed the Nursing Notes.      Review of Systems  Anesthesia Hx:  No problems with previous Anesthesia    Social:  Former Smoker    Cardiovascular:   Exercise tolerance: good Denies Pacemaker. Hypertension  Denies Valvular problems/Murmurs.  Denies MI.  Denies CAD.    Denies CABG/stent.  Denies Dysrhythmias.   Denies Angina.             denies PVD hyperlipidemia    Pulmonary:   Denies Pneumonia Denies COPD.  Denies Asthma.  Denies Shortness of breath.  Denies Recent URI. Sleep Apnea    Renal/:   Chronic Renal Disease, CRI Stage II-III CKD   Hepatic/GI:  Hepatic/GI Normal    Musculoskeletal:   Arthritis     Neurological:  Neurology Normal    Endocrine:  Endocrine Normal        Physical Exam  General:  Well nourished    Airway/Jaw/Neck:   M3-4  Mouth opening > 3 cm but somewhat small  Front top tooth is a cap but not loose  Good prognatism  Normal neck ROM          Mental Status:  Mental Status Findings: Normal        Anesthesia Plan  Type of Anesthesia, risks & benefits discussed:  Anesthesia Type:  general  Patient's Preference:   Intra-op Monitoring Plan:   Intra-op Monitoring Plan Comments:   Post Op Pain Control Plan:   Post Op Pain Control Plan Comments:   Induction:   IV  Beta Blocker:  Patient is not currently on a Beta-Blocker (No further documentation required).       Informed Consent: Patient understands risks and agrees with Anesthesia plan.  Questions answered. Anesthesia consent signed with patient.  ASA Score: 2     Day of Surgery Review of History & Physical:    H&P update referred to the surgeon.     Anesthesia Plan Notes: Appropriately NPO  Attention during intubation: top front tooth is a cap and airway does not look easy. Would have video scope in  room.  Pt has SAMANTHA so would keep him in PACU until at least one hour of no apnea.  Note hx of post-op nausea even with spinal        Ready For Surgery From Anesthesia Perspective.

## 2017-06-19 NOTE — PLAN OF CARE
Problem: Physical Therapy Goal  Goal: Physical Therapy Goal  Goals to be met by: 17    Patient will increase functional independence with mobility by performin. Sit to stand transfer with Supervision  2. Gait x500 feet with Supervision using Rolling Walker  3. Ascend/descend 5 stair with bilateral Handrails Supervision  4. Lower extremity exercise program x30 reps per handout, with supervision      Patient okay to ambulate with nursing staff supervision and rolling walker.

## 2017-06-19 NOTE — H&P
No changes to H&P related to this admission.    Pt has had progressively worsening left hip pain which now limits the patients ability to walk or stand for extended periods. The pain is limiting stair climbing and interfering with normal activities of daily living.  The pain has been non-responsive to NSAID therapy. The patients functional level has not improved with an exercise program designed to strengthen the LE muscles and improve ambulation.  Radiographs show arthritis in the hip joint with significant loss of cartilage joint space and development of osteophytes and subchondral cysts.  The patient understands risks associated with joint replacement after all questions have been answered.

## 2017-06-19 NOTE — BRIEF OP NOTE
Operative Note         SUMMARY     Surgery Date: 6/19/2017     Surgeon(s) and Role:     * Ricky Sanders MD - Primary    Pre-op Diagnosis:  Osteoarthritis of left hip, unspecified osteoarthritis type [M16.12]    Post-op Diagnosis:  L hip OA - M16.12    Procedure(s) (LRB):  ARTHROPLASTY-HIP (ANTERIOR APPROACH) (Left)    Anesthesia: General    Findings/Key Components:    As above - L FRANCOIS with Leah/Biomet    Specimen:    No specimen to Pathology orders placed during procedure(s).     Specimen (12h ago through future)    Start     Ordered    06/19/17 0911  Specimen to Pathology - Surgery  Once     Comments:  Left hip synovial Left hip femoral headLeft hip femoral canal      06/19/17 0912            Estimated Blood Loss: * No values recorded between 6/19/2017  7:55 AM and 6/19/2017  9:29 AM *

## 2017-06-19 NOTE — OR NURSING
Right anterior hip dressing dry and intact, DP/PT pulse palpable.  Brisk refill, warm to touch. Positive movement and sensation to LLE.

## 2017-06-19 NOTE — OP NOTE
DATE OF PROCEDURE:  06/19/2017    SURGEON:  Ricky Sanders M.D.    ASSISTANT:  KARAN Orellana PA-C.    PREOPERATIVE DIAGNOSIS:  Osteoarthritis, left hip.    POSTOPERATIVE DIAGNOSIS:  Osteoarthritis, left hip.    PROCEDURES:  Left total hip replacement with Biomet biometric stem size 14, T3   press fit Leah cup, size 50 mm.    ANESTHESIA:  General.    COMPLICATIONS:  None.    BLOOD LOSS:  200 mL.    BLOOD GIVEN:  None.    PROCEDURE IN DETAIL:  Following obtaining proper informed consent from the   patient, he was taken to the Operating Room and given general anesthesia by   endotracheal tube.  The patient was given IV antibiotics and tranexamic acid.    The patient was placed in a traction table for the anterior approach procedure.    Preoperative x-rays were done to measure leg lengths.  The patient clinically   has left leg longer, but this was felt to be due to degenerative scoliosis.    Radiographically, his leg lengths appeared to be equal.  The left hip was   prepped and draped in the usual sterile fashion.  Standard anterior approach was   used.  The tensor fascia was incised and the interval was developed with blunt   dissection.  The anterior capsule was exposed.  Anterior capsulotomy was   performed.  Clear serous fluid was obtained from the joint with no sign of   infection.  The synovium was very hyperemic and hypertrophic synovium was sent   to Pathology for studies.  The hip was exposed.  Under radiographic image, the   femoral neck cut was marked and a femoral neck cut was made in appropriate   position.  The femoral head was then removed with the corkscrew.  The head was   completely denuded of cartilage, which was broken off.  The acetabulum was   cleared of all soft tissue and sequentially reamed to a 49 mm and a 50 mm TC3   Leah cup was placed with good fixation obtained.  A trial liner was placed   with 36 mm head.    The proximal femur was then mobilized and brought it to  the wound with   retractors to protect soft tissue.  The canal was entered with a curette and a   yellow material consistent with consistency of yogurt was obtained and appeared   abnormal, but may be the intramedullary fat.  This was sent to Pathology for   studies.  There was no sign of infection or tumor.  The canal was then broached   with the Biometric assistant to a size 14 mm.  Trials were performed and a high   offset trial with a standard neck length appeared to provide equal leg lengths   with good stability.  Radiographs were obtained throughout the case to confirm   placement of acetabulum and 10 degrees to 15 degrees of anteversion and leg   lengths.  The wound was irrigated with Pulsavac lavage throughout the case.  The   capsule was injected with Marcaine, Duramorph, Depo-Medrol, and Exparel.  The   36 mm liner was placed.  The 14 high offset stem was placed and was retrialed   and a standard neck length 36 head was placed.  The capsule was closed with   interrupted #1 Vicryl suture.  Tensor fascia was closed with #1 running Vicryl   Plus suture and the skin closed with 2-0 Vicryl suture and staples to the skin.    Sterile dressing was applied and the patient was taken to Recovery in stable   condition.      ESPERANZA  dd: 06/19/2017 10:10:10 (CDT)  td: 06/19/2017 11:25:57 (CDT)  Doc ID   #[id number]  Job ID #[id number]    CC:

## 2017-06-19 NOTE — CONSULTS
66 y o M with hx of htn, old cva, lumbar disc disease, djd L hip and renal sarcoid with keri/ckd3 admitted for L THR ( Total L hip Replacement ) Pt underwent surgery earlier today without any complications. Renal input requested to help with the renal aspect of his care.    Last Creat was @1.8 done @ 10-14 days ago    Awake alert oriented NAD    Denies CNS ENT CP GI RHEUM OR DERM SX    Had difficulty in voiding after surgery , strait cath removed @ 800 ml of urine.  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    Hypertension     Lumbar disc disease     Psoriatic arthritis     Renal disease      Review of patient's allergies indicates:  No Known Allergies    Current Facility-Administered Medications   Medication    acetaminophen (10 mg/mL) injection 1,000 mg    amlodipine tablet 10 mg    bisacodyl suppository 10 mg    cefazolin (ANCEF) 2 gram in dextrose 5% 50 mL IVPB (premix)    docusate sodium capsule 100 mg    famotidine tablet 20 mg    lactated ringers infusion    morphine injection 2 mg    multivitamin tablet 1 tablet    mupirocin 2 % ointment 1 g    ondansetron injection 4 mg    oxycodone immediate release tablet 10 mg    pravastatin tablet 40 mg    tamsulosin 24 hr capsule 0.4 mg    tamsulosin 24 hr capsule 0.4 mg    zolpidem tablet 5 mg     Family History   Problem Relation Age of Onset    Cancer Mother     Hypertension Father     Stroke Father      Social History     Social History    Marital status:      Spouse name: N/A    Number of children: N/A    Years of education: N/A     Occupational History     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[facility] [employer]     Social History Main Topics    Smoking status: Former Smoker     Types: Pipe     Quit date: 1985    Smokeless tobacco: Never Used    Alcohol use 0.0 oz/week      Comment: occassional    Drug use: No    Sexual activity: Not on file     Other Topics Concern    Not on file     Social  "History Narrative    No narrative on file       LABS    Recent Results (from the past 24 hour(s))   Type & Screen    Collection Time: 06/19/17  7:47 AM   Result Value Ref Range    Group & Rh A POS     Indirect Shruthi NEG    Gram stain    Collection Time: 06/19/17  9:12 AM   Result Value Ref Range    Gram Stain Result Rare WBC's     Gram Stain Result No organisms seen    CBC auto differential    Collection Time: 06/19/17 10:10 AM   Result Value Ref Range    WBC 9.67 3.90 - 12.70 K/uL    RBC 3.01 (L) 4.60 - 6.20 M/uL    Hemoglobin 9.5 (L) 14.0 - 18.0 g/dL    Hematocrit 29.2 (L) 40.0 - 54.0 %    MCV 97 82 - 98 fL    MCH 31.6 (H) 27.0 - 31.0 pg    MCHC 32.5 32.0 - 36.0 %    RDW 14.7 (H) 11.5 - 14.5 %    Platelets 245 150 - 350 K/uL    MPV 9.5 9.2 - 12.9 fL    Gran # 6.8 1.8 - 7.7 K/uL    Lymph # 1.8 1.0 - 4.8 K/uL    Mono # 1.0 0.3 - 1.0 K/uL    Eos # 0.1 0.0 - 0.5 K/uL    Baso # 0.04 0.00 - 0.20 K/uL    Gran% 69.9 38.0 - 73.0 %    Lymph% 18.7 18.0 - 48.0 %    Mono% 9.8 4.0 - 15.0 %    Eosinophil% 1.2 0.0 - 8.0 %    Basophil% 0.4 0.0 - 1.9 %    Differential Method Automated        No intake/output data recorded.    Vitals:    06/19/17 1045 06/19/17 1100 06/19/17 1115 06/19/17 1158   BP: 135/74 132/75  139/69   Pulse: 86 88  90   Resp: 20 (!) 22  20   Temp:   98 °F (36.7 °C) 98.2 °F (36.8 °C)   TempSrc:    Oral   SpO2: 100% 99%  (!) 92%   Weight:    88.6 kg (195 lb 5.2 oz)   Height:    5' 7" (1.702 m)       No Jvd, Thyromegaly or Lymphadenopathy  Lungs: Fairly clear anteriorly and laterally  Cor: RRR no G or rubs  Abd: Soft benign good bowel sounds non tender  Ext: No E C C L hip sore post op    A)  CKD 3   Renal Sarcoid  Mild proteinuria  HTN  DJD  SP L THR  Anemia  Hx Of cva  Post op urinary retention  Psoriatic arthritis     P)    Low sodium diet  Maintain IVF until he can eat and drink normally  Check Bladder scan q6 h x 3  BMP  Avoid NSAID  When able home meds    "

## 2017-06-19 NOTE — PT/OT/SLP EVAL
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    Hypertension     Lumbar disc disease     Psoriatic arthritis     Renal disease       Past Surgical History:   Procedure Laterality Date    ANKLE SURGERY Right     BACK SURGERY      FRACTURE SURGERY      JOINT REPLACEMENT      Rt total hip    KNEE SURGERY  02/13/2017    ROTATOR CUFF REPAIR Right      Referring physician: Tommy  Date referred to PT: 6/19/17    General Precautions: Standard, fall  Orthopedic Precautions: LLE weight bearing as tolerated, LLE anterior precautions     Patient History:  Lives With: spouse  Living Arrangements: house  Home Accessibility: stairs within home  Home Layout: Able to live on 1st floor  Equipment Currently Used at Home: cane, straight, bath bench, walker, rolling    Previous Level of Function:  Ambulation Skills: independent  Transfer Skills: independent  ADL Skills: independent  Work/Leisure Activity: independent    Subjective:  Communicated with nurse Delatorre prior to session.  Patient stated that he already sat on edge of the bed.   Chief Complaint: Pain in left hip.   Patient goals: To get back to being independent.     Pain/Comfort  Pain Rating : 5/10  Location - Side : Left  Location : hip  Pain Addressed 1: Pre-medicate for activity, Distraction, Cessation of Activity  Pain Rating Post-Intervention : 5/10    Objective:   Patient found with: oxygen, peripheral IV, bilateral FCD's     Cognitive Exam:  Oriented to: Person, Place, Time and Situation    Follows Commands/attention: Follows multistep  commands  Communication:  clear/fluent  Safety awareness/insight to disability: intact    Physical Exam:  Postural examination/scapula alignment: Rounded shoulder and Head forward    Skin integrity: Visible skin intact with surgical dressing intact to left hip  Edema: Mild L LE    Sensation: Intact    Lower Extremity Range of Motion:  Right Lower Extremity: WFL  Left Lower Extremity: WFL    Lower Extremity Strength:  Right Lower Extremity: WFL  Left Lower Extremity: WFL except limited at hip and knee due to pain     Fine motor coordination:Intact    Gross motor coordination: WFL    Functional Mobility:  Bed Mobility:  Supine to Sit: Stand by Assistance  Sit to Supine: Minimum Assistance, With leg lift    Transfers:  Sit <> Stand Assistance: Contact Guard Assistance  Sit <> Stand Assistive Device: Rolling Walker    Gait:   Gait Distance: ~140ft   Assistance 1: Contact Guard Assistance  Gait Assistive Device: Rolling walker  Gait Pattern: 3-point gait  Gait Deviation(s): decreased lorne, decreased velocity of limb motion, decreased step length    Balance:   Static Sit: NORMAL: No deviations seen in posture held statically  Dynamic Sit: GOOD+: Maintains balance through MAXIMAL excursions of active trunk motion  Static Stand: GOOD-: Takes MODERATE challenges from all directions inconsistently  Dynamic stand: FAIR: Needs CONTACT GUARD during gait    AM-PAC 6 CLICK MOBILITY  How much help from another person does this patient currently need?   1 = Unable, Total/Dependent Assistance  2 = A lot, Maximum/Moderate Assistance  3 = A little, Minimum/Contact Guard/Supervision  4 = None, Modified [place]/Independent    Turning over in bed (including adjusting bedclothes, sheets and blankets)?: 4  Sitting down on and standing up from a chair with arms (e.g., wheelchair, bedside commode, etc.): 4  Moving from lying on back to sitting on the side of the bed?: 4  Moving to and from a bed to a chair (including a wheelchair)?: 3  Need to walk in hospital  room?: 3  Climbing 3-5 steps with a railing?: 3  Total Score: 21     AM-PAC Raw Score CMS G-Code Modifier Level of Impairment Assistance   6 % Total / Unable   7 - 9 CM 80 - 100% Maximal Assist   10 - 14 CL 60 - 80% Moderate Assist   15 - 19 CK 40 - 60% Moderate Assist   20 - 22 CJ 20 - 40% Minimal Assist   23 CI 1-20% SBA / CGA   24 CH 0% Independent/ Mod I     Patient left supine with all lines intact, call button in reach, nurse notified and spouse present.    Assessment:   Christophe Renteria is a 66 y.o. male with a medical diagnosis of s/p L FRANCOIS and presents with increased pain and decreased functional mobility. He is okay to ambulate with nursing staff supervision and rolling walker. He would continue to benefit from PT while in the hospital in order to address deficits listed below and get patient back to PLOF.     Rehab identified problem list/impairments: Rehab identified problem list/impairments: gait instability, impaired functional mobilty, impaired balance, decreased lower extremity function, pain, impaired skin, orthopedic precautions    Rehab potential is good.    Activity tolerance: Good    Discharge recommendations: Discharge Facility/Level Of Care Needs: home health PT (with assistance from family as needed)     Barriers to discharge: Barriers to Discharge: None    Equipment recommendations: Equipment Needed After Discharge: bedside commode     GOALS:    Physical Therapy Goals        Problem: Physical Therapy Goal    Goal Priority Disciplines Outcome Goal Variances Interventions   Physical Therapy Goal     PT/OT, PT      Description:  Goals to be met by: 17    Patient will increase functional independence with mobility by performin. Sit to stand transfer with Supervision  2. Gait x500 feet with Supervision using Rolling Walker  3. Ascend/descend 5 stair with bilateral Handrails Supervision  4. Lower extremity exercise program x30 reps per handout, with supervision                     PLAN:    Patient to be seen BID to address the above listed problems via gait training, therapeutic activities, therapeutic exercises  Plan of Care expires: 06/26/17  Plan of Care reviewed with: patient, spouse    Functional Assessment Tool Used: AM PAC   Score: 21  Functional Limitation: Mobility: Walking and moving around  Mobility: Walking and Moving Around Current Status (): CJ  Mobility: Walking and Moving Around Goal Status (): FABIANO Lund, PT, MOT  06/19/2017

## 2017-06-19 NOTE — OR NURSING
Patient continues on 40% simple face mask, occasionally apneic, instructed to cough and take deep breaths when needed. Done without difficulty.  Dr. Muller updated on current status, no additional orders given.

## 2017-06-20 PROBLEM — M17.12 DEGENERATIVE JOINT DISEASE OF KNEE, LEFT: Status: RESOLVED | Noted: 2017-02-13 | Resolved: 2017-06-20

## 2017-06-20 PROBLEM — M16.12 OSTEOARTHRITIS OF LEFT HIP: Status: RESOLVED | Noted: 2017-06-19 | Resolved: 2017-06-20

## 2017-06-20 LAB
ANION GAP SERPL CALC-SCNC: 8 MMOL/L
BASOPHILS # BLD AUTO: 0.01 K/UL
BASOPHILS NFR BLD: 0.1 %
BUN SERPL-MCNC: 25 MG/DL
CALCIUM SERPL-MCNC: 8.7 MG/DL
CHLORIDE SERPL-SCNC: 106 MMOL/L
CO2 SERPL-SCNC: 22 MMOL/L
CREAT SERPL-MCNC: 2.3 MG/DL
DIFFERENTIAL METHOD: ABNORMAL
EOSINOPHIL # BLD AUTO: 0 K/UL
EOSINOPHIL NFR BLD: 0 %
ERYTHROCYTE [DISTWIDTH] IN BLOOD BY AUTOMATED COUNT: 14.6 %
EST. GFR  (AFRICAN AMERICAN): 33 ML/MIN/1.73 M^2
EST. GFR  (NON AFRICAN AMERICAN): 29 ML/MIN/1.73 M^2
GLUCOSE SERPL-MCNC: 198 MG/DL
HCT VFR BLD AUTO: 24.9 %
HGB BLD-MCNC: 8.3 G/DL
LYMPHOCYTES # BLD AUTO: 0.8 K/UL
LYMPHOCYTES NFR BLD: 6.9 %
MCH RBC QN AUTO: 31.8 PG
MCHC RBC AUTO-ENTMCNC: 33.3 %
MCV RBC AUTO: 95 FL
MONOCYTES # BLD AUTO: 1.3 K/UL
MONOCYTES NFR BLD: 10.8 %
NEUTROPHILS # BLD AUTO: 9.8 K/UL
NEUTROPHILS NFR BLD: 82.2 %
PLATELET # BLD AUTO: 288 K/UL
PMV BLD AUTO: 9.9 FL
POTASSIUM SERPL-SCNC: 3.8 MMOL/L
RBC # BLD AUTO: 2.61 M/UL
SODIUM SERPL-SCNC: 136 MMOL/L
WBC # BLD AUTO: 11.94 K/UL

## 2017-06-20 PROCEDURE — 94761 N-INVAS EAR/PLS OXIMETRY MLT: CPT

## 2017-06-20 PROCEDURE — 25000242 PHARM REV CODE 250 ALT 637 W/ HCPCS: Performed by: INTERNAL MEDICINE

## 2017-06-20 PROCEDURE — 36415 COLL VENOUS BLD VENIPUNCTURE: CPT

## 2017-06-20 PROCEDURE — 97535 SELF CARE MNGMENT TRAINING: CPT

## 2017-06-20 PROCEDURE — 97165 OT EVAL LOW COMPLEX 30 MIN: CPT

## 2017-06-20 PROCEDURE — 80048 BASIC METABOLIC PNL TOTAL CA: CPT

## 2017-06-20 PROCEDURE — 63600175 PHARM REV CODE 636 W HCPCS: Performed by: ORTHOPAEDIC SURGERY

## 2017-06-20 PROCEDURE — 97116 GAIT TRAINING THERAPY: CPT

## 2017-06-20 PROCEDURE — 25000003 PHARM REV CODE 250: Performed by: ORTHOPAEDIC SURGERY

## 2017-06-20 PROCEDURE — 27000221 HC OXYGEN, UP TO 24 HOURS

## 2017-06-20 PROCEDURE — 85025 COMPLETE CBC W/AUTO DIFF WBC: CPT

## 2017-06-20 PROCEDURE — 51798 US URINE CAPACITY MEASURE: CPT

## 2017-06-20 PROCEDURE — 94640 AIRWAY INHALATION TREATMENT: CPT

## 2017-06-20 PROCEDURE — 11000001 HC ACUTE MED/SURG PRIVATE ROOM

## 2017-06-20 RX ORDER — ASCORBIC ACID 500 MG
500 TABLET ORAL DAILY
Status: DISCONTINUED | OUTPATIENT
Start: 2017-06-21 | End: 2017-06-21 | Stop reason: HOSPADM

## 2017-06-20 RX ORDER — ASPIRIN 325 MG
325 TABLET ORAL DAILY
Status: DISCONTINUED | OUTPATIENT
Start: 2017-06-20 | End: 2017-06-21 | Stop reason: HOSPADM

## 2017-06-20 RX ORDER — HYDROCODONE BITARTRATE AND ACETAMINOPHEN 5; 325 MG/1; MG/1
2 TABLET ORAL EVERY 4 HOURS PRN
Status: DISCONTINUED | OUTPATIENT
Start: 2017-06-20 | End: 2017-06-21 | Stop reason: HOSPADM

## 2017-06-20 RX ORDER — BETHANECHOL CHLORIDE 25 MG/1
25 TABLET ORAL 2 TIMES DAILY PRN
Status: DISCONTINUED | OUTPATIENT
Start: 2017-06-20 | End: 2017-06-21 | Stop reason: HOSPADM

## 2017-06-20 RX ORDER — FERROUS GLUCONATE 324(38)MG
324 TABLET ORAL
Status: DISCONTINUED | OUTPATIENT
Start: 2017-06-21 | End: 2017-06-21 | Stop reason: HOSPADM

## 2017-06-20 RX ADMIN — ACETAMINOPHEN 1000 MG: 10 INJECTION, SOLUTION INTRAVENOUS at 02:06

## 2017-06-20 RX ADMIN — PRAVASTATIN SODIUM 40 MG: 40 TABLET ORAL at 10:06

## 2017-06-20 RX ADMIN — BETHANECHOL CHLORIDE 25 MG: 25 TABLET ORAL at 12:06

## 2017-06-20 RX ADMIN — TAMSULOSIN HYDROCHLORIDE 0.4 MG: 0.4 CAPSULE ORAL at 10:06

## 2017-06-20 RX ADMIN — MUPIROCIN 1 G: 20 OINTMENT TOPICAL at 10:06

## 2017-06-20 RX ADMIN — CEFAZOLIN SODIUM 2 G: 2 SOLUTION INTRAVENOUS at 01:06

## 2017-06-20 RX ADMIN — ACETAMINOPHEN 1000 MG: 10 INJECTION, SOLUTION INTRAVENOUS at 06:06

## 2017-06-20 RX ADMIN — IPRATROPIUM BROMIDE AND ALBUTEROL SULFATE 3 ML: .5; 3 SOLUTION RESPIRATORY (INHALATION) at 07:06

## 2017-06-20 RX ADMIN — OXYCODONE HYDROCHLORIDE 10 MG: 5 TABLET ORAL at 07:06

## 2017-06-20 RX ADMIN — HYDROCODONE BITARTRATE AND ACETAMINOPHEN 2 TABLET: 5; 325 TABLET ORAL at 08:06

## 2017-06-20 RX ADMIN — SODIUM CHLORIDE, SODIUM LACTATE, POTASSIUM CHLORIDE, AND CALCIUM CHLORIDE: .6; .31; .03; .02 INJECTION, SOLUTION INTRAVENOUS at 01:06

## 2017-06-20 RX ADMIN — FAMOTIDINE 20 MG: 20 TABLET, FILM COATED ORAL at 10:06

## 2017-06-20 RX ADMIN — DOCUSATE SODIUM 100 MG: 100 CAPSULE, LIQUID FILLED ORAL at 08:06

## 2017-06-20 RX ADMIN — IPRATROPIUM BROMIDE AND ALBUTEROL SULFATE 3 ML: .5; 3 SOLUTION RESPIRATORY (INHALATION) at 01:06

## 2017-06-20 RX ADMIN — DOCUSATE SODIUM 100 MG: 100 CAPSULE, LIQUID FILLED ORAL at 10:06

## 2017-06-20 RX ADMIN — ASPIRIN 325 MG ORAL TABLET 325 MG: 325 PILL ORAL at 11:06

## 2017-06-20 RX ADMIN — FAMOTIDINE 20 MG: 20 TABLET, FILM COATED ORAL at 08:06

## 2017-06-20 RX ADMIN — BETHANECHOL CHLORIDE 25 MG: 25 TABLET ORAL at 08:06

## 2017-06-20 RX ADMIN — THERA TABS 1 TABLET: TAB at 10:06

## 2017-06-20 RX ADMIN — OXYCODONE HYDROCHLORIDE 10 MG: 5 TABLET ORAL at 11:06

## 2017-06-20 NOTE — NURSING
Notified Dr Sanders patient's post void residual after voided 325ml clear yellow urine was >500 per bladder scan. Patient denies feeling urge to go on pressure. Perform In and out berg cath or insert berg?  Verbalized understanding. Instructed to not to reinsert berg or in and out, rec order for Urecohline 25mg po for prn urinary retention.

## 2017-06-20 NOTE — CONSULTS
Consult Note  Pulmonology    Consult Requested By: Ricky Sanders MD  Reason for Consult: post op f/u    SUBJECTIVE: cough     History of Present Illness:  Pt has h/o of mild asthmatic bronchitis. Followed  By us in past.    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    Hypertension     Lumbar disc disease     Psoriatic arthritis     Renal disease      Past Surgical History:   Procedure Laterality Date    ANKLE SURGERY Right     BACK SURGERY      FRACTURE SURGERY      JOINT REPLACEMENT      Rt total hip    KNEE SURGERY  02/13/2017    ROTATOR CUFF REPAIR Right      Family History   Problem Relation Age of Onset    Cancer Mother     Hypertension Father     Stroke Father           Review of Systems:  No headaches  No diploplia  No dysphagia  No chest pains   No nausea or vomiting   No arthralgia      OBJECTIVE:     Vital Signs (Most Recent)  Temp: 97.8 °F (36.6 °C) (06/20/17 1208)  Pulse: 87 (06/20/17 1208)  Resp: 18 (06/20/17 1208)  BP: 122/64 (06/20/17 1208)  SpO2: 95 % (06/20/17 1208)    Vital Signs Range (Last 24H):  Temp:  [97.8 °F (36.6 °C)-98.7 °F (37.1 °C)]   Pulse:  []   Resp:  [16-20]   BP: (105-122)/(55-70)   SpO2:  [91 %-99 %]     Physical Exam:  General; comfortable  Neck: no jugular venous distention. Chest clear  Heart: regular rate and rhythm and no murmur  Abdomen: soft, non-tender non-distended; bowel sounds normal  Extremities: s/p post R hip replacement  Neurologic:sedated,not responsive    Laboratory:  Recent Results (from the past 24 hour(s))   Basic metabolic panel    Collection Time: 06/19/17  6:07 PM   Result Value Ref Range    Sodium 133 (L) 136 - 145 mmol/L    Potassium 4.2 3.5 - 5.1 mmol/L    Chloride 103 95 - 110 mmol/L    CO2 21 (L) 23 - 29 mmol/L    Glucose 271 (H) 70 - 110 mg/dL    BUN, Bld 24 (H) 8 - 23 mg/dL    Creatinine 2.2 (H) 0.5  - 1.4 mg/dL    Calcium 9.0 8.7 - 10.5 mg/dL    Anion Gap 9 8 - 16 mmol/L    eGFR if African American 35 (A) >60 mL/min/1.73 m^2    eGFR if non African American 31 (A) >60 mL/min/1.73 m^2   CBC auto differential    Collection Time: 06/20/17  4:15 AM   Result Value Ref Range    WBC 11.94 3.90 - 12.70 K/uL    RBC 2.61 (L) 4.60 - 6.20 M/uL    Hemoglobin 8.3 (L) 14.0 - 18.0 g/dL    Hematocrit 24.9 (L) 40.0 - 54.0 %    MCV 95 82 - 98 fL    MCH 31.8 (H) 27.0 - 31.0 pg    MCHC 33.3 32.0 - 36.0 %    RDW 14.6 (H) 11.5 - 14.5 %    Platelets 288 150 - 350 K/uL    MPV 9.9 9.2 - 12.9 fL    Gran # 9.8 (H) 1.8 - 7.7 K/uL    Lymph # 0.8 (L) 1.0 - 4.8 K/uL    Mono # 1.3 (H) 0.3 - 1.0 K/uL    Eos # 0.0 0.0 - 0.5 K/uL    Baso # 0.01 0.00 - 0.20 K/uL    Gran% 82.2 (H) 38.0 - 73.0 %    Lymph% 6.9 (L) 18.0 - 48.0 %    Mono% 10.8 4.0 - 15.0 %    Eosinophil% 0.0 0.0 - 8.0 %    Basophil% 0.1 0.0 - 1.9 %    Differential Method Automated    Basic metabolic panel    Collection Time: 06/20/17  4:15 AM   Result Value Ref Range    Sodium 136 136 - 145 mmol/L    Potassium 3.8 3.5 - 5.1 mmol/L    Chloride 106 95 - 110 mmol/L    CO2 22 (L) 23 - 29 mmol/L    Glucose 198 (H) 70 - 110 mg/dL    BUN, Bld 25 (H) 8 - 23 mg/dL    Creatinine 2.3 (H) 0.5 - 1.4 mg/dL    Calcium 8.7 8.7 - 10.5 mg/dL    Anion Gap 8 8 - 16 mmol/L    eGFR if African American 33 (A) >60 mL/min/1.73 m^2    eGFR if non African American 29 (A) >60 mL/min/1.73 m^2     CBC:   Recent Labs  Lab 06/20/17  0415   WBC 11.94   RBC 2.61*   HGB 8.3*   HCT 24.9*      MCV 95   MCH 31.8*   MCHC 33.3     BMP:   Recent Labs  Lab 06/20/17  0415   *      K 3.8      CO2 22*   BUN 25*   CREATININE 2.3*   CALCIUM 8.7     CMP:   Recent Labs  Lab 06/13/17  1415  06/20/17  0415   *  < > 198*   CALCIUM 9.5  < > 8.7   ALBUMIN 3.2*  --   --    PROT 6.4  --   --    *  < > 136   K 3.8  < > 3.8   CO2 24  < > 22*     < > 106   BUN 26*  < > 25*   CREATININE 2.3*  < > 2.3*    ALKPHOS 69  --   --    ALT 28  --   --    AST 31  --   --    BILITOT 0.6  --   --    < > = values in this interval not displayed.  LFTs:   Recent Labs  Lab 06/13/17  1415   ALT 28   AST 31   ALKPHOS 69   BILITOT 0.6   PROT 6.4   ALBUMIN 3.2*     Coagulation:   Recent Labs  Lab 06/13/17  1415   INR 1.1   APTT 29.2                       ASSESSMENT/PLAN:     1. Osteoarthritis of left hip, unspecified osteoarthritis type    2.      Asthmatic bronchitis        Plan:  Doing fine on present therapy ; will follow.

## 2017-06-20 NOTE — PROGRESS NOTES
Progress Note  Orthopedics    Admit Date: 6/19/2017  Post-operative Day: 1 Day Post-Op  Hospital Day: 2    Follow-up For: Procedure(s) (LRB):  ARTHROPLASTY-HIP (ANTERIOR APPROACH) (Left)    SUBJECTIVE:     Christophe Renteria is 66 y.o. and is now 1 day post surgery. Pain is controlled with current analgesics.. He  He is ambulating.  Weight Bearing: WBAT    A&O. AFVSS. NVI L LE  Dressing L hip C/D/I.  Ambulating 500 feet with PT.  Con't with urinary retention.    OBJECTIVE:     Vital Signs (Most Recent)  Temp: 97.8 °F (36.6 °C) (06/20/17 1208)  Pulse: 81 (06/20/17 1345)  Resp: 20 (06/20/17 1345)  BP: 122/64 (06/20/17 1208)  SpO2: 96 % (06/20/17 1345)      Physical Exam:  Dressing: clean, dry and intact  Incision: not examined  DVT Evaluation: No evidence of DVT seen on physical exam.  Neurovascular: 5/5 motor strength, sensation intact, palpable pulses    Laboratory:  Recent Results (from the past 24 hour(s))   Basic metabolic panel    Collection Time: 06/19/17  6:07 PM   Result Value Ref Range    Sodium 133 (L) 136 - 145 mmol/L    Potassium 4.2 3.5 - 5.1 mmol/L    Chloride 103 95 - 110 mmol/L    CO2 21 (L) 23 - 29 mmol/L    Glucose 271 (H) 70 - 110 mg/dL    BUN, Bld 24 (H) 8 - 23 mg/dL    Creatinine 2.2 (H) 0.5 - 1.4 mg/dL    Calcium 9.0 8.7 - 10.5 mg/dL    Anion Gap 9 8 - 16 mmol/L    eGFR if African American 35 (A) >60 mL/min/1.73 m^2    eGFR if non African American 31 (A) >60 mL/min/1.73 m^2   CBC auto differential    Collection Time: 06/20/17  4:15 AM   Result Value Ref Range    WBC 11.94 3.90 - 12.70 K/uL    RBC 2.61 (L) 4.60 - 6.20 M/uL    Hemoglobin 8.3 (L) 14.0 - 18.0 g/dL    Hematocrit 24.9 (L) 40.0 - 54.0 %    MCV 95 82 - 98 fL    MCH 31.8 (H) 27.0 - 31.0 pg    MCHC 33.3 32.0 - 36.0 %    RDW 14.6 (H) 11.5 - 14.5 %    Platelets 288 150 - 350 K/uL    MPV 9.9 9.2 - 12.9 fL    Gran # 9.8 (H) 1.8 - 7.7 K/uL    Lymph # 0.8 (L) 1.0 - 4.8 K/uL    Mono # 1.3 (H) 0.3 - 1.0 K/uL    Eos # 0.0 0.0 - 0.5 K/uL    Baso #  0.01 0.00 - 0.20 K/uL    Gran% 82.2 (H) 38.0 - 73.0 %    Lymph% 6.9 (L) 18.0 - 48.0 %    Mono% 10.8 4.0 - 15.0 %    Eosinophil% 0.0 0.0 - 8.0 %    Basophil% 0.1 0.0 - 1.9 %    Differential Method Automated    Basic metabolic panel    Collection Time: 06/20/17  4:15 AM   Result Value Ref Range    Sodium 136 136 - 145 mmol/L    Potassium 3.8 3.5 - 5.1 mmol/L    Chloride 106 95 - 110 mmol/L    CO2 22 (L) 23 - 29 mmol/L    Glucose 198 (H) 70 - 110 mg/dL    BUN, Bld 25 (H) 8 - 23 mg/dL    Creatinine 2.3 (H) 0.5 - 1.4 mg/dL    Calcium 8.7 8.7 - 10.5 mg/dL    Anion Gap 8 8 - 16 mmol/L    eGFR if African American 33 (A) >60 mL/min/1.73 m^2    eGFR if non African American 29 (A) >60 mL/min/1.73 m^2         ASSESSMENT/PLAN:     Assessment: orthopedic stable, condition improving  Status Post: L FRANCOIS    Plan: con't with PT and DVT prophy.  Plan d/c home tomorrow with HH.  Nephrology OK with ASA 325mg po qd for DVT prophy.  Add Urecholine to Flomax for UR.  Add Fergon for low Hct.

## 2017-06-20 NOTE — PLAN OF CARE
SW met with pt at bedside. SW explained role in . SW inquired about HELP AT HOME. Pt stated that pt will have help at home with Spouse Lesvia. SW reviewed HELP AT HOME and DISCHARGE PLANNING brochure of questions to think about while in the hospital. Pt voiced understanding. SW inquired about what pt feels he is RESPONSIBLE for to MANAGE HEALTH AT HOME. Pt stated going to MD appointments and medications. SW voiced that taking medications at the appropriate time is important with managing care. SW informed pt that a DISCHARGE EDUCATION SHEET (SURGERY) will be provided during stay. Pt voiced understanding.       [facility] - [facility] - [place], [place] - [address] [place] [id number]  Phone: [phone] Fax: [phone]       06/20/17 1107   Discharge Assessment   Assessment Type Discharge Planning Assessment   Confirmed/corrected address and phone number on facesheet? Yes   Assessment information obtained from? Patient   Expected Length of Stay (days) 1   Prior to hospitilization cognitive status: Alert/Oriented   Prior to hospitalization functional status: Independent   Current cognitive status: Alert/Oriented   Current Functional Status: Independent;Assistive Equipment   Arrived From admitted as an inpatient;home or self-care   Lives With spouse   Able to Return to Prior Arrangements yes   Is patient able to care for self after discharge? Yes   How many people do you have in your home that can help with your care after discharge? 1   Who are your caregiver(s) and their phone number(s)? Spouse Lesvia   Patient's perception of discharge disposition home or selfcare   Readmission Within The Last 30 Days no previous admission in last 30 days   Patient currently receives home health services? No   Equipment Currently Used at Home cane, straight;bath bench;walker, rolling   Do you have any problems affording any of your prescribed medications? No   Is the patient taking  medications as prescribed? yes   Do you have any financial concerns preventing you from receiving the healthcare you need? No   Does the patient have transportation to healthcare appointments? Yes   Transportation Available car;family or friend will provide   On Dialysis? No   Discharge Plan A Home;Home with family;Home Health  ([facility] )   Patient/Family In Agreement With Plan yes

## 2017-06-20 NOTE — PLAN OF CARE
Problem: Occupational Therapy Goal  Goal: Occupational Therapy Goal  Outcome: Outcome(s) achieved Date Met: 06/20/17  OT eval is complete. The patient is aware of his hip precautions and use of the Hip Kit for LE dressing. The patient agrees to order a hip kit for home use. No Further OT is recommended and will D/C.

## 2017-06-20 NOTE — PROGRESS NOTES
Patient requesting berg catheter to be placed until morning due to high residual after voiding. Will notify MD. Maher to place berg per Dr. Barksdale.

## 2017-06-20 NOTE — PT/OT/SLP PROGRESS
Physical Therapy  Treatment    Christophe Renteria   MRN: [id number]   Admitting Diagnosis: Hyperlipidemia    PT Received On: 06/20/17  PT Start Time: 1100     PT Stop Time: 1114    PT Total Time (min): 14 min       Billable Minutes:  Gait Training 14    Treatment Type: Treatment  PT/PTA: PT     PTA Visit Number: 0       General Precautions: Standard, fall  Orthopedic Precautions: LLE anterior precautions, LLE weight bearing as tolerated     Subjective:  Communicated with nurse Delatorre prior to session.  Patient states that he has already been performing all of his exercises this AM while seated in bedside chair.     Pain/Comfort  Pain Rating : other (see comments) (patient c/o of pain but did not rate with number )  Location - Side : Left  Location : hip  Pain Addressed : Pre-medicate for activity, Cessation of Activity    Objective:   Patient found with: peripheral IV    Functional Mobility: Patient found seated in bedside chair.     Transfers:  Sit <> Stand Assistance: Supervision  Sit <> Stand Assistive Device: Rolling Walker    Gait:   Gait Distance: ~500ft   Assistance 1: Supervision  Gait Assistive Device: Rolling walker  Gait Pattern: 3-point gait    Balance:   Static Sit: NORMAL: No deviations seen in posture held statically  Dynamic Sit: GOOD+: Maintains balance through MAXIMAL excursions of active trunk motion  Static Stand: GOOD: Takes MODERATE challenges from all directions  Dynamic stand: GOOD-: Needs SUPERVISION only during gait and able to self right with moderate      Therapeutic Activities and Exercises:  Patient able to demonstrate all L LE exercises including A/P, LAQ, heel slides, and hip flex. Patient stated he will continue to perform while seated in bedside chair.     AM-PAC 6 CLICK MOBILITY  How much help from another person does this patient currently need?   1 = Unable, Total/Dependent Assistance  2 = A lot, Maximum/Moderate Assistance  3 = A little, Minimum/Contact Guard/Supervision  4 = None,  Modified [place]/Independent    Turning over in bed (including adjusting bedclothes, sheets and blankets)?: 4  Sitting down on and standing up from a chair with arms (e.g., wheelchair, bedside commode, etc.): 4  Moving from lying on back to sitting on the side of the bed?: 4  Moving to and from a bed to a chair (including a wheelchair)?: 4  Need to walk in hospital room?: 4  Climbing 3-5 steps with a railing?: 3  Total Score: 23    AM-PAC Raw Score CMS G-Code Modifier Level of Impairment Assistance   6 % Total / Unable   7 - 9 CM 80 - 100% Maximal Assist   10 - 14 CL 60 - 80% Moderate Assist   15 - 19 CK 40 - 60% Moderate Assist   20 - 22 CJ 20 - 40% Minimal Assist   23 CI 1-20% SBA / CGA   24 CH 0% Independent/ Mod I     Patient left up in chair with all lines intact, call button in reach and nurse Nandini notified that PT emptied urinal with 325.    Assessment:  Christophe Renteria is a 66 y.o. male with a medical diagnosis of Hyperlipidemia and presents with increased pain and decreased functional mobility. He would continue to benefit from PT while in the hospital in order to address deficits listed below and get patient back to PLOF.     Rehab identified problem list/impairments: Rehab identified problem list/impairments: impaired functional mobilty, gait instability, impaired balance, decreased lower extremity function, pain, orthopedic precautions, impaired skin    Rehab potential is good.    Activity tolerance: Good    Discharge recommendations: Discharge Facility/Level Of Care Needs: home health PT (with family assist )     Barriers to discharge: Barriers to Discharge: None    Equipment recommendations: Equipment Needed After Discharge: bedside commode     GOALS:    Physical Therapy Goals        Problem: Physical Therapy Goal    Goal Priority Disciplines Outcome Goal Variances Interventions   Physical Therapy Goal     PT/OT, PT      Description:  Goals to be met by: 6/26/17    Patient will  increase functional independence with mobility by performin. Sit to stand transfer with Supervision  2. Gait x500 feet with Supervision using Rolling Walker  3. Ascend/descend 5 stair with bilateral Handrails Supervision  4. Lower extremity exercise program x30 reps per handout, with supervision                    PLAN:    Patient to be seen BID  to address the above listed problems via gait training, therapeutic activities, therapeutic exercises  Plan of Care expires: 17  Plan of Care reviewed with: patient    Marion Lund, PT, MOT  2017

## 2017-06-20 NOTE — NURSING
Notified Dr Burnett in reference to pt's inability to urinate, bladder scan 814ml, performed in and out berg cath, 800ml output. Pt wheezing requested Resp tx, pt would like to use home CPAP.  Verbalized understanding. Received new orders.

## 2017-06-20 NOTE — PROGRESS NOTES
Christophe Renteria is a 66 y.o. male patient.    Follow for CHIVO, CKD stg 3, renal sarcoid    No new c/o, comfortable    Scheduled Meds:   acetaminophen  1,000 mg Intravenous Q8H    albuterol-ipratropium 2.5mg-0.5mg/3mL  3 mL Nebulization Q6H    amlodipine  10 mg Oral Daily    aspirin  325 mg Oral Daily    docusate sodium  100 mg Oral Q12H    famotidine  20 mg Oral BID    multivitamin  1 tablet Oral Daily    mupirocin  1 g Nasal BID    pravastatin  40 mg Oral Daily    tamsulosin  0.4 mg Oral Daily       Review of patient's allergies indicates:  No Known Allergies      Vital Signs Range (Last 24H):  Temp:  [98 °F (36.7 °C)-98.7 °F (37.1 °C)]   Pulse:  []   Resp:  [16-26]   BP: (105-139)/(55-75)   SpO2:  [91 %-100 %]     I & O (Last 24H):  Intake/Output Summary (Last 24 hours) at 06/20/17 0945  Last data filed at 06/20/17 0741   Gross per 24 hour   Intake             3390 ml   Output             5545 ml   Net            -2155 ml           Physical Exam:  General appearance: well developed, well nourished, no distress  Lungs:  clear to auscultation bilaterally and normal respiratory effort  Heart: regular rate and rhythm  Abdomen: soft, non-tender non-distented; bowel sounds normal; no masses,  no organomegaly  Extremities: no cyanosis or edema, or clubbing    Laboratory:  CBC:   Recent Labs  Lab 06/20/17  0415   WBC 11.94   RBC 2.61*   HGB 8.3*   HCT 24.9*      MCV 95   MCH 31.8*   MCHC 33.3     CMP:   Recent Labs  Lab 06/13/17  1415  06/20/17  0415   *  < > 198*   CALCIUM 9.5  < > 8.7   ALBUMIN 3.2*  --   --    PROT 6.4  --   --    *  < > 136   K 3.8  < > 3.8   CO2 24  < > 22*     < > 106   BUN 26*  < > 25*   CREATININE 2.3*  < > 2.3*   ALKPHOS 69  --   --    ALT 28  --   --    AST 31  --   --    BILITOT 0.6  --   --    < > = values in this interval not displayed.    Imp/Plan    CHIVO - creatinine stable  CKD stg 3  Renal sarcoid  Post op urinaty retention    S/p (L)  THR  HTN  Anemia of chronic disease/acute loss    Continue present Rx  Follow post void residual          Trac T Le  6/20/2017

## 2017-06-20 NOTE — PT/OT/SLP PROGRESS
Physical Therapy  Treatment    Christophe Renteria   MRN: [id number]   Admitting Diagnosis: Hyperlipidemia    PT Received On: 06/20/17  PT Start Time: 1454     PT Stop Time: 1505    PT Total Time (min): 11 min       Billable Minutes:  Gait Training 11    Treatment Type: Treatment  PT/PTA: PT     PTA Visit Number: 0       General Precautions: Standard, fall  Orthopedic Precautions: LLE weight bearing as tolerated, LLE anterior precautions   Braces: N/A    Subjective:  Communicated with nurse Delatorre prior to session.  Patient agreeable to participate in PT session.     Pain/Comfort  Pain Rating : other (see comments) (patient c/o of soreness but did not rate with number )  Location - Side : Left  Location : hip  Pain Addressed : Cessation of Activity    Objective:   Patient found with: peripheral IV    Functional Mobility:  Bed Mobility:   Supine to Sit: Modified Independent, With side rail  Sit to Supine: Minimum Assistance, With leg lift    Transfers:  Sit <> Stand Assistance: Modified Independent  Sit <> Stand Assistive Device: Rolling Walker    Gait:   Gait Distance: ~500ft   Assistance 1: Modified Independent  Gait Assistive Device: Rolling walker  Gait Pattern: 3-point gait    Balance:   Static Sit: NORMAL: No deviations seen in posture held statically  Dynamic Sit: NORMAL: No deviations seen in posture held dynamically  Static Stand: GOOD: Takes MODERATE challenges from all directions  Dynamic stand: GOOD-: Needs SUPERVISION only during gait and able to self right with moderate      AM-PAC 6 CLICK MOBILITY  How much help from another person does this patient currently need?   1 = Unable, Total/Dependent Assistance  2 = A lot, Maximum/Moderate Assistance  3 = A little, Minimum/Contact Guard/Supervision  4 = None, Modified [place]/Independent    Turning over in bed (including adjusting bedclothes, sheets and blankets)?: 4  Sitting down on and standing up from a chair with arms (e.g., wheelchair, bedside commode,  etc.): 4  Moving from lying on back to sitting on the side of the bed?: 4  Moving to and from a bed to a chair (including a wheelchair)?: 4  Need to walk in hospital room?: 4  Climbing 3-5 steps with a railing?: 3  Total Score: 23    AM-PAC Raw Score CMS G-Code Modifier Level of Impairment Assistance   6 % Total / Unable   7 - 9 CM 80 - 100% Maximal Assist   10 - 14 CL 60 - 80% Moderate Assist   15 - 19 CK 40 - 60% Moderate Assist   20 - 22 CJ 20 - 40% Minimal Assist   23 CI 1-20% SBA / CGA   24 CH 0% Independent/ Mod I     Patient left supine with all lines intact, call button in reach, nurse notified and spouse present.    Assessment:  Christophe Renteria is a 66 y.o. male with a medical diagnosis of Hyperlipidemia and presents with increased pain and decreased functional mobility due to recent surgery. He is okay to ambulate with rolling walker and nursing staff or family supervision. He would continue to benefit from PT while in the hospital in order to address deficits listed below and get patient back to PLOF.     Rehab identified problem list/impairments: Rehab identified problem list/impairments: impaired functional mobilty, gait instability, impaired balance, pain, orthopedic precautions, impaired skin    Rehab potential is good.    Activity tolerance: Good    Discharge recommendations: Discharge Facility/Level Of Care Needs: home health PT (with family assist as needed)     Barriers to discharge: Barriers to Discharge: None    Equipment recommendations: Equipment Needed After Discharge: bedside commode     GOALS:    Physical Therapy Goals        Problem: Physical Therapy Goal    Goal Priority Disciplines Outcome Goal Variances Interventions   Physical Therapy Goal     PT/OT, PT Ongoing (interventions implemented as appropriate)     Description:  Goals to be met by: 17    Patient will increase functional independence with mobility by performin. Sit to stand transfer with Supervision  2.  Gait x500 feet with Supervision using Rolling Walker  3. Ascend/descend 5 stair with bilateral Handrails Supervision  4. Lower extremity exercise program x30 reps per handout, with supervision                    PLAN:    Patient to be seen BID  to address the above listed problems via gait training, therapeutic activities, therapeutic exercises  Plan of Care expires: 06/26/17  Plan of Care reviewed with: patient, spouse    Marion Lund, PT, MOT  06/20/2017

## 2017-06-20 NOTE — PLAN OF CARE
Problem: Patient Care Overview  Goal: Plan of Care Review  Outcome: Ongoing (interventions implemented as appropriate)  Patient remains free from injury and falls. Villalta catheter placed due to urinary retention. Pain controlled with PRN PO analgesics. Incentive spirometer at bedside. Patient using independently. Sleeping with home cpap machine. IVF infusing as ordered. IV antibiotic administered. Scheduled IV Tylenol administered as ordered. Moves independently in bed. Plan of care reviewed with patient.

## 2017-06-20 NOTE — PLAN OF CARE
Problem: Physical Therapy Goal  Goal: Physical Therapy Goal  Goals to be met by: 17    Patient will increase functional independence with mobility by performin. Sit to stand transfer with Supervision  2. Gait x500 feet with Supervision using Rolling Walker  3. Ascend/descend 5 stair with bilateral Handrails Supervision  4. Lower extremity exercise program x30 reps per handout, with supervision     Outcome: Ongoing (interventions implemented as appropriate)    Patient okay to ambulate with rolling walker and nursing staff or family supervision.

## 2017-06-20 NOTE — CONSULTS
Orders received to arrange home health and DME for anticipated discharge. Patient in agreement with home health at discharge. Patient informed of the recommended DME (bedside commode and hip kit) patient was also informed that the bedside commode is usually covered and the hip kit is not. TN informed him that the hip kit is $43.50 if he wanted to purchase it. He states that he would like to purchase the equipment if its not covered by insurance. Patient Choice form signed with patient selecting [facility] as provider of choice. Yellow copy given to patient, white copy placed in patients blue folder.    All paperwork (facesheet, H&P, progress notes, PT/OT notes, MAR) collected and faxed to Sukumar with [facility] [phone]. Fax confirmation received.     Orders for DME (bedside commode and hip kit) faxed to [facility] along with facesheet [phone]. Fax confirmation received.

## 2017-06-20 NOTE — ANESTHESIA POSTPROCEDURE EVALUATION
"Anesthesia Post Evaluation    Patient: Christophe Renteria    Procedure(s) Performed: Procedure(s) (LRB):  ARTHROPLASTY-HIP (ANTERIOR APPROACH) (Left)    Final Anesthesia Type: general  Patient location during evaluation: PACU  Level of consciousness: oriented and awake and alert  Post-procedure vital signs: reviewed and stable  Pain management: adequate  Airway patency: patent  PONV status at discharge: No PONV  Anesthetic complications: no      Cardiovascular status: blood pressure returned to baseline and hemodynamically stable  Respiratory status: unassisted  Hydration status: euvolemic  Follow-up not needed.        Visit Vitals  /64   Pulse 87   Temp 36.6 °C (97.8 °F) (Oral)   Resp 18   Ht 5' 7" (1.702 m)   Wt 88.6 kg (195 lb 5.2 oz)   SpO2 95%   BMI 30.59 kg/m²       Pain/Zo Score: Pain Assessment Performed: Yes (6/20/2017  6:00 AM)  Presence of Pain: denies (6/20/2017  6:00 AM)  Pain Rating Prior to Med Admin: 7 (6/20/2017 11:45 AM)  Pain Rating Post Med Admin: 2 (6/19/2017 10:25 PM)  Zo Score: 8 (6/19/2017 11:15 AM)      "

## 2017-06-20 NOTE — PROGRESS NOTES
"TN notified via rightMurfie that pt has been accepted by [facility]. All information added to "follow up" tab of pt's chart.  "

## 2017-06-20 NOTE — PT/OT/SLP EVAL
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: none     Patient History:  Living Environment  Lives With: spouse  Living Arrangements: house  Home Accessibility: stairs within home  Home Layout: Able to live on 1st floor  Number of Stairs Within Home:  (1 flight)  Stair Railings at Home: inside, present on left side  Transportation Available: family or friend will provide, car  Equipment Currently Used at Home: cane, straight, walker, rolling, bath bench (reacher)    Prior level of function:   Bed Mobility/Transfers: independent  Grooming: independent  Bathing: independent  Upper Body Dressing: independent  Lower Body Dressing: independent  Toileting: independent  Home Management Skills: independent  Driving License: Yes  Mode of Transportation: Car  Occupation: Full time employment  Type of Occupation: orthopedic surgeon  IADL Comments: Patient recently started using a cane to amb.      Dominant  "hand: right    Subjective:  Communicated with Nandini huerta prior to session.    Chief Complaint: left thigh feels "sore"  Patient/Family stated goals: resume (I) is ADL    Pain/Comfort  Pain Rating 1: 2/10 (at rest)  Location - Side 1: Left  Location 1: hip  Pain Addressed 1: Pre-medicate for activity, Cessation of Activity  Pain Rating Post-Intervention 1: 5/10 (with amb)    Objective:  Patient found with: SCD, peripheral IV    Cognitive Exam:  Oriented to: Person, Place, Time and Situation  Follows Commands/attention: Follows multistep  commands  Communication: clear/fluent  Memory:  No Deficits noted  Safety awareness/insight to disability: intact  Coping skills/emotional control: Appropriate to situation    Visual/perceptual:  Intact    Physical Exam:  Postural examination/scapula alignment: No postural abnormalities identified  Skin integrity: Visible skin intact and left hip covered by dressing  Edema: None noted     Sensation:   Intact    Upper Extremity Range of Motion:  Right Upper Extremity: WNL  Left Upper Extremity: WNL    Upper Extremity Strength:  Right Upper Extremity: WNL  Left Upper Extremity: WNL   Strength: WNL    Fine motor coordination:   Intact    Gross motor coordination: WFL    Functional Mobility:  Bed Mobility:  Supine to Sit: Stand by Assistance, WIth side rail (HOB elevated)    Transfers:  Sit <> Stand Assistance: Supervision  Sit <> Stand Assistive Device: Rolling Walker    Functional Ambulation: The patient amb using a RW with (S) around the bed to a chair ~18'.    Activities of Daily Living:  Feeding Level of Assistance: Independent  UE Dressing Level of Assistance: Independent  (don tshirt)   LE Dressing Level of Assistance: Minimum assistance (to don pants)  LE adaptive equipment: Reacher and Stocking Aid   The patient was educated re: use of the reacher to don pants and remove socks. The patient was able to doff B socks using a reacher. The patient was able to [place] B socks using " "a sock aide after demo.           Bathing Where Assessed: Edge of bed  Bathing Level of Assistance: Set-up Assistance (to sponge bathe)  Bathing adaptive equipment: none    Balance:   Static Sit: GOOD: Takes MODERATE challenges from all directions  Dynamic Sit: GOOD: Maintains balance through MODERATE excursions of active trunk movement  Static Stand: GOOD: Takes MODERATE challenges from all directions  Dynamic stand: GOOD-: Needs SUPERVISION only during gait and able to self right with moderate     Therapeutic Activities and Exercises:  The patient and spouse were educated re: Hip precautions and use of a hip kit for LE dressing.    -PAC 6 CLICK ADL  How much help from another person does this patient currently need?  1 = Unable, Total/Dependent Assistance  2 = A lot, Maximum/Moderate Assistance  3 = A little, Minimum/Contact Guard/Supervision  4 = None, Modified [place]/Independent    Putting on and taking off regular lower body clothing? : 3  Bathing (including washing, rinsing, drying)?: 4  Toileting, which includes using toilet, bedpan, or urinal? : 4  Putting on and taking off regular upper body clothing?: 4  Taking care of personal grooming such as brushing teeth?: 4  Eating meals?: 4  Total Score: 23    AM-PAC Raw Score CMS "G-Code Modifier Level of Impairment Assistance   6 % Total / Unable   7 - 9 CM 80 - 100% Maximal Assist   10-14 CL 60 - 80% Moderate Assist   15 - 19 CK 40 - 60% Moderate Assist   20 - 22 CJ 20 - 40% Minimal Assist   23 CI 1-20% SBA / CGA   24 CH 0% Independent/ Mod I       Patient left up in chair with all lines intact, call button in reach and nurse, Nandini notified    Assessment:  Christophe Renteria is a 66 y.o. male with a medical diagnosis of s/p left FRANCOIS. OT eval is complete. The patient is aware of his hip precautions and use of the Hip Kit for LE dressing. The patient agrees to order a hip kit for home use. The patient's spouse is availbe to assist as needed at " home. No Further OT is recommended and will D/C.      Rehab identified problem list/impairments: Rehab identified problem list/impairments: gait instability, impaired functional mobilty, impaired balance, impaired self care skills, pain, orthopedic precautions, decreased lower extremity function, impaired skin    Rehab potential is excellent.    Activity tolerance: Good    Discharge recommendations: Discharge Facility/Level Of Care Needs: home health PT (with family assist)     Barriers to discharge: Barriers to Discharge: None    Equipment recommendations: hip kit     GOALS:    Occupational Therapy Goals     Not on file          Multidisciplinary Problems (Resolved)        Problem: Occupational Therapy Goal    Goal Priority Disciplines Outcome Interventions   Occupational Therapy Goal   (Resolved)     OT, PT/OT Outcome(s) achieved                    PLAN:    (No OT is recommended)  Plan of Care reviewed with: patient, spouse         Cherelle Brandon OT  06/20/2017

## 2017-06-21 VITALS
TEMPERATURE: 98 F | WEIGHT: 195.31 LBS | HEIGHT: 67 IN | BODY MASS INDEX: 30.65 KG/M2 | OXYGEN SATURATION: 97 % | HEART RATE: 97 BPM | DIASTOLIC BLOOD PRESSURE: 68 MMHG | RESPIRATION RATE: 18 BRPM | SYSTOLIC BLOOD PRESSURE: 118 MMHG

## 2017-06-21 LAB
ANION GAP SERPL CALC-SCNC: 9 MMOL/L
BASOPHILS # BLD AUTO: 0.03 K/UL
BASOPHILS NFR BLD: 0.3 %
BUN SERPL-MCNC: 22 MG/DL
CALCIUM SERPL-MCNC: 9 MG/DL
CHLORIDE SERPL-SCNC: 107 MMOL/L
CO2 SERPL-SCNC: 26 MMOL/L
CREAT SERPL-MCNC: 2.2 MG/DL
DIFFERENTIAL METHOD: ABNORMAL
EOSINOPHIL # BLD AUTO: 0.1 K/UL
EOSINOPHIL NFR BLD: 1 %
ERYTHROCYTE [DISTWIDTH] IN BLOOD BY AUTOMATED COUNT: 15.5 %
EST. GFR  (AFRICAN AMERICAN): 35 ML/MIN/1.73 M^2
EST. GFR  (NON AFRICAN AMERICAN): 31 ML/MIN/1.73 M^2
GLUCOSE SERPL-MCNC: 117 MG/DL
HCT VFR BLD AUTO: 24.8 %
HGB BLD-MCNC: 8 G/DL
LYMPHOCYTES # BLD AUTO: 1.2 K/UL
LYMPHOCYTES NFR BLD: 13.2 %
MCH RBC QN AUTO: 31.6 PG
MCHC RBC AUTO-ENTMCNC: 32.3 %
MCV RBC AUTO: 98 FL
MONOCYTES # BLD AUTO: 1.4 K/UL
MONOCYTES NFR BLD: 15.5 %
NEUTROPHILS # BLD AUTO: 6.1 K/UL
NEUTROPHILS NFR BLD: 70 %
PLATELET # BLD AUTO: 257 K/UL
PMV BLD AUTO: 10 FL
POTASSIUM SERPL-SCNC: 4 MMOL/L
RBC # BLD AUTO: 2.53 M/UL
SODIUM SERPL-SCNC: 142 MMOL/L
WBC # BLD AUTO: 8.71 K/UL

## 2017-06-21 PROCEDURE — G8979 MOBILITY GOAL STATUS: HCPCS | Mod: CI

## 2017-06-21 PROCEDURE — G8978 MOBILITY CURRENT STATUS: HCPCS | Mod: CI

## 2017-06-21 PROCEDURE — 36415 COLL VENOUS BLD VENIPUNCTURE: CPT

## 2017-06-21 PROCEDURE — 80048 BASIC METABOLIC PNL TOTAL CA: CPT

## 2017-06-21 PROCEDURE — 97116 GAIT TRAINING THERAPY: CPT

## 2017-06-21 PROCEDURE — 25000242 PHARM REV CODE 250 ALT 637 W/ HCPCS: Performed by: INTERNAL MEDICINE

## 2017-06-21 PROCEDURE — 94640 AIRWAY INHALATION TREATMENT: CPT

## 2017-06-21 PROCEDURE — 25000003 PHARM REV CODE 250: Performed by: ORTHOPAEDIC SURGERY

## 2017-06-21 PROCEDURE — 85025 COMPLETE CBC W/AUTO DIFF WBC: CPT

## 2017-06-21 PROCEDURE — 94761 N-INVAS EAR/PLS OXIMETRY MLT: CPT

## 2017-06-21 PROCEDURE — G8980 MOBILITY D/C STATUS: HCPCS | Mod: CI

## 2017-06-21 RX ORDER — FAMOTIDINE 20 MG/1
20 TABLET, FILM COATED ORAL 2 TIMES DAILY
Qty: 60 TABLET | Refills: 0
Start: 2017-06-21 | End: 2019-09-10

## 2017-06-21 RX ORDER — ASPIRIN 325 MG
325 TABLET ORAL DAILY
Refills: 0
Start: 2017-06-21 | End: 2021-10-26

## 2017-06-21 RX ADMIN — TAMSULOSIN HYDROCHLORIDE 0.4 MG: 0.4 CAPSULE ORAL at 09:06

## 2017-06-21 RX ADMIN — FAMOTIDINE 20 MG: 20 TABLET, FILM COATED ORAL at 09:06

## 2017-06-21 RX ADMIN — HYDROCODONE BITARTRATE AND ACETAMINOPHEN 2 TABLET: 5; 325 TABLET ORAL at 10:06

## 2017-06-21 RX ADMIN — HYDROCODONE BITARTRATE AND ACETAMINOPHEN 2 TABLET: 5; 325 TABLET ORAL at 06:06

## 2017-06-21 RX ADMIN — BETHANECHOL CHLORIDE 25 MG: 25 TABLET ORAL at 06:06

## 2017-06-21 RX ADMIN — MUPIROCIN 1 G: 20 OINTMENT TOPICAL at 09:06

## 2017-06-21 RX ADMIN — AMLODIPINE BESYLATE 10 MG: 5 TABLET ORAL at 09:06

## 2017-06-21 RX ADMIN — OXYCODONE HYDROCHLORIDE AND ACETAMINOPHEN 500 MG: 500 TABLET ORAL at 09:06

## 2017-06-21 RX ADMIN — THERA TABS 1 TABLET: TAB at 09:06

## 2017-06-21 RX ADMIN — FERROUS GLUCONATE TAB 324 MG (37.5 MG ELEMENTAL IRON) 324 MG: 324 (37.5 FE) TAB at 09:06

## 2017-06-21 RX ADMIN — IPRATROPIUM BROMIDE AND ALBUTEROL SULFATE 3 ML: .5; 3 SOLUTION RESPIRATORY (INHALATION) at 07:06

## 2017-06-21 RX ADMIN — PRAVASTATIN SODIUM 40 MG: 40 TABLET ORAL at 09:06

## 2017-06-21 RX ADMIN — ASPIRIN 325 MG ORAL TABLET 325 MG: 325 PILL ORAL at 09:06

## 2017-06-21 RX ADMIN — DOCUSATE SODIUM 100 MG: 100 CAPSULE, LIQUID FILLED ORAL at 09:06

## 2017-06-21 RX ADMIN — HYDROCODONE BITARTRATE AND ACETAMINOPHEN 2 TABLET: 5; 325 TABLET ORAL at 01:06

## 2017-06-21 RX ADMIN — IPRATROPIUM BROMIDE AND ALBUTEROL SULFATE 3 ML: .5; 3 SOLUTION RESPIRATORY (INHALATION) at 12:06

## 2017-06-21 NOTE — PT/OT/SLP PROGRESS
Physical Therapy  Treatment / Discharge    Christophe Renteria   MRN: [id number]   Admitting Diagnosis: Osteoarthritis of left hip    PT Received On: 06/21/17  PT Start Time: 1011     PT Stop Time: 1028    PT Total Time (min): 17 min       Billable Minutes:  Gait Training 17     Treatment Type: Treatment  PT/PTA: PT     PTA Visit Number: 0       General Precautions: Standard, fall  Orthopedic Precautions: LLE weight bearing as tolerated, LLE anterior precautions     Subjective:  Communicated with nurse Weaver prior to session.  Patient agreeable to participate in PT session.     Pain/Comfort  Pain Rating : other (see comments) (did not rate with number)  Location - Side : Left  Location - Orientation : anterior  Location : hip  Pain Addressed : Cessation of Activity    Objective:   Patient found with: peripheral IV    Functional Mobility:  Bed Mobility:   Supine to Sit: Modified Independent, With side rail    Transfers:  Sit <> Stand Assistance: Modified Independent  Sit <> Stand Assistive Device: Rolling Walker    Gait:   Gait Distance: ~500ft   Assistance 1: Modified Independent  Gait Assistive Device: Rolling walker  Gait Pattern: 3-point gait    Balance:   Static Sit: NORMAL: No deviations seen in posture held statically  Dynamic Sit: NORMAL: No deviations seen in posture held dynamically  Static Stand: GOOD+: Takes MAXIMAL challenges from all directions  Dynamic stand: GOOD: Needs SUPERVISION only during gait and able to self right with moderate      Therapeutic Activities and Exercises:  Patient verbalized that he has still been having trouble with knee flexion. PT put towel under patient's foot to assist him with knee flexion due to having hospital  socks on and he stated that knee flexion was easier now.     AM-PAC 6 CLICK MOBILITY  How much help from another person does this patient currently need?   1 = Unable, Total/Dependent Assistance  2 = A lot, Maximum/Moderate Assistance  3 = A little,  Minimum/Contact Guard/Supervision  4 = None, Modified [place]/Independent    Turning over in bed (including adjusting bedclothes, sheets and blankets)?: 4  Sitting down on and standing up from a chair with arms (e.g., wheelchair, bedside commode, etc.): 4  Moving from lying on back to sitting on the side of the bed?: 4  Moving to and from a bed to a chair (including a wheelchair)?: 4  Need to walk in hospital room?: 4  Climbing 3-5 steps with a railing?: 3  Total Score: 23    AM-PAC Raw Score CMS G-Code Modifier Level of Impairment Assistance   6 % Total / Unable   7 - 9 CM 80 - 100% Maximal Assist   10 - 14 CL 60 - 80% Moderate Assist   15 - 19 CK 40 - 60% Moderate Assist   20 - 22 CJ 20 - 40% Minimal Assist   23 CI 1-20% SBA / CGA   24 CH 0% Independent/ Mod I     Patient left up in chair with all lines intact, call button in reach, nurse notified and spouse present.    Assessment:  Christophe Renteria is a 66 y.o. male with a medical diagnosis of Osteoarthritis of left hip and presents with increased pain and decreased functional mobility due to recent surgery. He was discharged home after PT session.     Rehab identified problem list/impairments: Rehab identified problem list/impairments: impaired functional mobilty, gait instability, impaired balance, pain, orthopedic precautions, impaired skin    Rehab potential is excellent.    Activity tolerance: Good    Discharge recommendations: Discharge Facility/Level Of Care Needs: home health PT (with family assistance as needed )     Barriers to discharge: Barriers to Discharge: None    Equipment recommendations: Equipment Needed After Discharge: bedside commode     GOALS:    Physical Therapy Goals        Problem: Physical Therapy Goal    Goal Priority Disciplines Outcome Goal Variances Interventions   Physical Therapy Goal     PT/OT, PT Unable to achieve outcome(s) by discharge     Description:  Goals to be met by: 6/26/17    Patient will increase  functional independence with mobility by performin. Sit to stand transfer with Supervision  2. Gait x500 feet with Supervision using Rolling Walker  3. Ascend/descend 5 stair with bilateral Handrails Supervision  4. Lower extremity exercise program x30 reps per handout, with supervision                    PLAN:    Patient to be seen  (patient scheduled to discharge home this PM )  to address the above listed problems via gait training, therapeutic activities, therapeutic exercises  Plan of Care expires: 17  Plan of Care reviewed with: patient, spouse    PT G-Codes  Functional Assessment Tool Used: AM PAC   Score: 23  Functional Limitation: Mobility: Walking and moving around  Mobility: Walking and Moving Around Current Status (): CI  Mobility: Walking and Moving Around Goal Status (): CI  Mobility: Walking and Moving Around Discharge Status (): CI    Marion Lund, PT, MOT  2017

## 2017-06-21 NOTE — PROGRESS NOTES
"TN reviewed follow up appointment information as well as  "Post op discharge instructions" handout with patient using teach back while informing patient to concentrate on signs and symptoms to look for after discharge that would flag him that he needs to contact the doctor. Patient is in agreement and verbalized an understanding. Placed discharge information in blue discharge folder.  SW also reviewed patient responsibility checklist with him using teach back. Patient was able to verbalize his responsibilities after discharge to manage his care at home being   1. Going to follow up appointments   2.  rx from the pharmacy when discharged  3. Taking his medication as prescribed       "

## 2017-06-21 NOTE — PLAN OF CARE
Problem: Patient Care Overview  Goal: Plan of Care Review  Patient free of falls or injury. Pain managed well with prescribed medications. Patient tolerating activity. No pressure related breakdown. Safety precautions intact and maintained. Will continue to monitor.

## 2017-06-21 NOTE — PROGRESS NOTES
WRITTEN HEALTHCARE DISCHARGE INFORMATION     Things that YOU are responsible for to Manage Your Care At Home:  1. Getting your prescriptions filled.  2. Taking you medications as directed. DO NOT MISS ANY DOSES!  3. Going to your follow-up doctor appointments. This is important because it allows the doctor to monitor your progress and to determine if any changes need to be made to your treatment plan.    If you are unable to make your follow up appointments, please call the number listed and reschedule this appointment.     After discharge, if you need assistance, you can call [facility] On Call Nurse Care Line for 24/7 assistance at [phone]    Thank you for choosing [facility] and allowing us to care for you.   From your care management team: Kandi Manrique [phone] or [phone]     You should receive a call from [facility] Discharge Department within 48-72 hours to help manage your care after discharge. Please try to make sure that you answer your phone for this important phone call.     Follow-up Information     Call Ricky Sanders MD.    Specialty:  Orthopedic Surgery  Why:  For Appointment  Contact information:  [address] [facility]  [place] [address]  [phone]             [facility].    Specialty:  DME Provider  Why:  BSC, Hip Kit  Contact information:  [address] [facility]  [address]  [facility] [address]  [phone]             [facility].    Specialty:  Home Health Services  Why:  Home PT  Contact information:  [address]  [facility] [address]  [phone]

## 2017-06-21 NOTE — PHYSICIAN QUERY
PT Name: Christophe Renteria  MR #: [id number]    Physician Query Form - Postoperative Relationship Clarification     CDS/: Cara Coleman RN, CCDS               Contact information: [phone]    This form is a permanent document in the medical record.     Query Date: June 21, 2017    Dear Provider,    By submitting this query, we are merely seeking further clarification of documentation. Please utilize your independent clinical judgment when addressing the question(s) below.    The Medical Record contains the following:    Supporting Clinical Findings   Location in Medical Record    Post - Op Urinary Retention    [place]  Con't with urinary retention.  Add urecholine and flomax   Renal CN 6/19      Ortho ON 6/20       Please clarify the term post operative   [X  ] Condition occurred in the post operative period (not a complication of surgery)   [  ] Condition is a complication of surgery   [  ] Other intended meaning (please specify) ____________________________

## 2017-06-21 NOTE — NURSING
Patient discharged per MD order. No distress noted. IV removed, catheter tip intact. Left via transport to [facility]. Patient provided rxs and d/c instructions. Patient verbalized understanding.

## 2017-06-21 NOTE — PHYSICIAN QUERY
"PT Name: Christophe Renteria  MR #: [id number]    Physician Query Form - Hematology Clarification      CDS/: Cara Coleman RN, CCDS              Contact information:[phone]    This form is a permanent document in the medical record.      Query Date: June 21, 2017    By submitting this query, we are merely seeking further clarification of documentation. Please utilize your independent clinical judgment when addressing the question(s) below.    The Medical record contains the following:   Indicators  Supporting Clinical Findings Location in Medical Record   x "Anemia" documented Anemia of chronic disease/acute loss Renal Pn 6/20     x H & H = On 6/13 = 10.4/30.8  On 6/20 =   8.0/24.8 Lab    BP =                     HR=      "GI bleeding" documented      Acute bleeding (Non GI site)      Transfusion(s)     x Treatment: Add Fergon for low hct    Renal Consult  Low sodium diet  Maintain IVF until he can eat and drink normally  Check Bladder scan q6 h x 3  BMP  Avoid NSAID  When able home meds   Ortho pn 6/20      Renal    x Other:  Left total hip replacement with Biomet biometric stem size 14, T3 press fit Leah cup, size 50 mm.    EBL: 200 ml   Ortho op Note 6/19   x  Renal Sarcoid  Mild proteinuria  HTN  DJD  SP L THR  Anemia  Hx Of cva  Post op urinary retention  Psoriatic arthritis     Renal CN 6/19     Provider, please specify diagnosis or diagnoses associated with above clinical findings.      Please further specify "acute loss."     [ X ] Acute blood loss anemia expected post-operatively    [  ] Iron deficiency anemia    [X  ] Anemia of chronic disease ( Specify chronic disease)      [ X ] CKD (specify stage) ___unknown________________________     [  ] Other (Specify) _______________________________     [  ] Clinically Undetermined     [  ] Other Hematological Diagnosis (please specify): _________________________________    [  ] Clinically Undetermined       Please document in your progress notes " daily for the duration of treatment, until resolved, and include in your discharge summary.

## 2017-06-21 NOTE — PROGRESS NOTES
Progress Note  Orthopedics    Admit Date: 6/19/2017  Post-operative Day: 2 Days Post-Op  Hospital Day: 3    Follow-up For: Procedure(s) (LRB):  ARTHROPLASTY-HIP (ANTERIOR APPROACH) (Left)    SUBJECTIVE:     Christophe Renteria is 66 y.o. and is now 2 days post surgery. Pain is controlled with current analgesics.. He  He is ambulating.  Weight Bearing: WBAT    A&O. AFVSS. NVI L LE. Dressing L hip clean and dry.  H&H - 8.0/24.8.  Ambulating 500 feet with PT.    OBJECTIVE:     Vital Signs (Most Recent)  Temp: 98 °F (36.7 °C) (06/21/17 0745)  Pulse: 95 (06/21/17 0745)  Resp: 17 (06/21/17 0745)  BP: 133/65 (06/21/17 0745)  SpO2: (!) 91 % (06/21/17 0745)      Physical Exam:  Dressing: clean, dry and intact  Incision: not examined  DVT Evaluation: No evidence of DVT seen on physical exam.  Neurovascular: 5/5 motor strength, sensation intact, palpable pulses    Laboratory:  Recent Results (from the past 24 hour(s))   CBC auto differential    Collection Time: 06/21/17  4:59 AM   Result Value Ref Range    WBC 8.71 3.90 - 12.70 K/uL    RBC 2.53 (L) 4.60 - 6.20 M/uL    Hemoglobin 8.0 (L) 14.0 - 18.0 g/dL    Hematocrit 24.8 (L) 40.0 - 54.0 %    MCV 98 82 - 98 fL    MCH 31.6 (H) 27.0 - 31.0 pg    MCHC 32.3 32.0 - 36.0 %    RDW 15.5 (H) 11.5 - 14.5 %    Platelets 257 150 - 350 K/uL    MPV 10.0 9.2 - 12.9 fL    Gran # 6.1 1.8 - 7.7 K/uL    Lymph # 1.2 1.0 - 4.8 K/uL    Mono # 1.4 (H) 0.3 - 1.0 K/uL    Eos # 0.1 0.0 - 0.5 K/uL    Baso # 0.03 0.00 - 0.20 K/uL    Gran% 70.0 38.0 - 73.0 %    Lymph% 13.2 (L) 18.0 - 48.0 %    Mono% 15.5 (H) 4.0 - 15.0 %    Eosinophil% 1.0 0.0 - 8.0 %    Basophil% 0.3 0.0 - 1.9 %    Differential Method Automated    Basic metabolic panel    Collection Time: 06/21/17  4:59 AM   Result Value Ref Range    Sodium 142 136 - 145 mmol/L    Potassium 4.0 3.5 - 5.1 mmol/L    Chloride 107 95 - 110 mmol/L    CO2 26 23 - 29 mmol/L    Glucose 117 (H) 70 - 110 mg/dL    BUN, Bld 22 8 - 23 mg/dL    Creatinine 2.2 (H) 0.5  - 1.4 mg/dL    Calcium 9.0 8.7 - 10.5 mg/dL    Anion Gap 9 8 - 16 mmol/L    eGFR if African American 35 (A) >60 mL/min/1.73 m^2    eGFR if non African American 31 (A) >60 mL/min/1.73 m^2         ASSESSMENT/PLAN:     Assessment: orthopedic stable, condition improving  Status Post: L FRANCOIS    Plan: con't with PT and DVT prophy.  Con't ASA 325mg po qd for 30 days.  Con't Teds B/L for 3 weeks.  D/C home with  PT this PM.  RTC 2 weeks.  WBAT L LE.  OK to shower.

## 2017-06-21 NOTE — PLAN OF CARE
06/21/17 1238   Final Note   Assessment Type Final Discharge Note   Discharge Disposition Home-Health  (Home Health PT)   Discharge planning education complete? Yes   What phone number can be called within the next 1-3 days to see how you are doing after discharge? [phone]   Hospital Follow Up  Appt(s) scheduled? Yes   Discharge plans and expectations educations in teach back method with documentation complete? Yes   Offered [facility] -- Bedside Delivery? n/a   Discharge/Hospital Encounter Summary to (non-[facility]) PCP No   Referral to Outpatient Case Management complete? n/a   Referral to / orders for Home Health Complete? Yes   30 day supply of medicines given at discharge, if documented non-compliance / non-adherence? No   Any social issues identified prior to discharge? No   Did you assess the readiness or willingness of the family or caregiver to support self management of care? Yes   Right Care Referral Info   Post Acute Recommendation Home-care   Referral Type Home Health PT    Facility Name [facility]

## 2017-06-22 NOTE — DISCHARGE SUMMARY
".Physician Discharge Summary     Patient ID:  Christophe Renteria  [id number]  66 y.o.  1950    Admit date: 6/19/2017    Discharge date and time: 6/21/2017  1:17 PM     Admitting Physician: Ricky Sanders MD     Admission Diagnoses: Osteoarthritis of left hip, unspecified osteoarthritis type [M16.12]  Osteoarthritis of left hip, unspecified osteoarthritis type [M16.12]  Osteoarthritis of left hip, unspecified osteoarthritis type [M16.12]    Discharge Diagnoses: L hip OA    Admission Condition: good    Discharged Condition: good    Discharge Exam:  /68   Pulse 97   Temp 97.9 °F (36.6 °C) (Oral)   Resp 18   Ht 5' 7" (1.702 m)   Wt 88.6 kg (195 lb 5.2 oz)   SpO2 97%   BMI 30.59 kg/m²     General Appearance:    Alert, cooperative, no distress, appears stated age   Head:    Normocephalic, without obvious abnormality, atraumatic   Eyes:    PERRL, conjunctiva/corneas clear, EOM's intact, fundi     benign, both eyes        Ears:    Normal TM's and external ear canals, both ears   Nose:   Nares normal, septum midline, mucosa normal, no drainage    or sinus tenderness   Throat:   Lips, mucosa, and tongue normal; teeth and gums normal   Neck:   Supple, symmetrical, trachea midline, no adenopathy;        thyroid:  No enlargement/tenderness/nodules; no carotid    bruit or JVD   Back:     Symmetric, no curvature, ROM normal, no CVA tenderness   Lungs:     Clear to auscultation bilaterally, respirations unlabored   Chest wall:    No tenderness or deformity   Heart:    Regular rate and rhythm, S1 and S2 normal, no murmur, rub   or gallop   Abdomen:     Soft, non-tender, bowel sounds active all four quadrants,     no masses, no organomegaly   Genitalia:    Normal male without lesion, discharge or tenderness   Rectal:    Normal tone, normal prostate, no masses or tenderness;    guaiac negative stool   Extremities:   Extremities normal, atraumatic, no cyanosis or edema   Pulses:   2+ and symmetric all extremities "   Skin:   Skin color, texture, turgor normal, no rashes or lesions   Lymph nodes:   Cervical, supraclavicular, and axillary nodes normal   Neurologic:   CNII-XII intact. Normal strength, sensation and reflexes       throughout         Disposition: Home-Health Care [facility]    Patient Instructions:   Discharge Medication List as of 6/21/2017 12:41 PM      START taking these medications    Details   aspirin 325 MG tablet Take 1 tablet (325 mg total) by mouth once daily., Starting Wed 6/21/2017, Until Fri 7/21/2017, No Print      famotidine (PEPCID) 20 MG tablet Take 1 tablet (20 mg total) by mouth 2 (two) times daily., Starting Wed 6/21/2017, Until Fri 7/21/2017, No Print         CONTINUE these medications which have NOT CHANGED    Details   amlodipine (NORVASC) 10 MG tablet Take 1 tablet (10 mg total) by mouth once daily., Starting 10/13/2016, Until Discontinued, Normal      multivitamin capsule Take 1 capsule by mouth once daily., Until Discontinued, Historical Med      pravastatin (PRAVACHOL) 40 MG tablet Take 1 tablet (40 mg total) by mouth once daily., Starting 10/13/2016, Until Discontinued, Normal      tamsulosin (FLOMAX) 0.4 mg Cp24 Starting 5/2/2017, Until Discontinued, Historical Med         STOP taking these medications       tramadol (ULTRAM) 50 mg tablet            Activity: activity as tolerated  Diet: renal diet  Wound Care: keep wound clean and dry and reinforce dressing PRN    Follow-up with Dr Sanders in 2 weeks.    Signed:  Ricky Sanders  6/22/2017  6:54 AM

## 2017-06-23 ENCOUNTER — PATIENT OUTREACH (OUTPATIENT)
Dept: ADMINISTRATIVE | Facility: CLINIC | Age: 67
End: 2017-06-23

## 2017-06-23 LAB
BACTERIA SPEC AEROBE CULT: NO GROWTH
BACTERIA SPEC ANAEROBE CULT: NORMAL

## 2017-06-23 NOTE — Clinical Note
Please forward this important TCC information to your provider in order to maximize the post discharge care delivery of this patient.  C3 nurse spoke with Christophe Saucedo  for a TCC post hospital discharge follow up call. The patient does not have a scheduled HOSFU appointment with Ehsan Small Jr, MD  within 7-14 days post hospital discharge date 6/21. C3 nurse was unable to schedule HOSFU appointment in [facility]. Please contact patient and schedule follow up appointment using HOSFU visit type on or before 7/5. DR SAUCEDO has an appointment with DR COATS in 2 weeks. Respectfully, Zakiya Silveira, RN  [facility]   [email or web address]     Please do not reply to this message, as this inbox is not routinely monitored.

## 2017-06-24 NOTE — PATIENT INSTRUCTIONS
Discharge Instructions for Hip Replacement Surgery  You had a hip replacement surgery. This means your natural hip was replaced with an artificial joint (prosthesis). You may be recovering at home or in a rehabilitation facility. Either way, you must take care of your new hip. Do this by moving and sitting the way you were taught in the hospital. Also, be sure to see your doctor for follow-up visits, and return to activity slowly. Because a total hip replacement is major surgery, it will be a few months before you can move comfortably.  Home Care  Take your pain medication exactly as directed.  Dont drive until your doctor says its okay. And never drive while taking narcotic pain medication.  Wear the support stockings you were given in the hospital. Wear them for 24 hours a day for 3 week(s).  To relieve discomfort at night, get up and move around.  Tell all your healthcare providers--including your dentist--about your artificial joint before any procedure. You will likely need to take antibiotics before dental work and other medical procedures to reduce the risk of infection.  Arrange to have your staples removed 2 week(s) after surgery. The staples were used to close the skin incision.  Incision Care  Check your incision daily for redness, swelling, tenderness, or drainage.  Avoid infection by washing your hands often. If an infection occurs, it will need to be treated immediately. So call your doctor right away if you think you may have an infection. Symptoms include a fever or an incision that leaks white, green, or yellow fluid.  Avoid soaking your incision in water (no hot tubs, bathtubs, swimming pools) until your doctor says its okay.  Wait 5-7 days after your surgery to begin showering. Then shower as needed. Carefully wash your incision with soap and water. Gently pat it dry. Dont rub the incision, or apply creams or lotions to it. And to avoid falling when showering, sit on a shower stool.  Sitting  and Sleeping  Dont sit for more than 30-45 minutes at a time.  Use chairs with arms, and sit with your knees slightly lower than your hips. Dont sit on low or sagging chairs or couches.  Dont lean forward while sitting.  Dont cross your legs.  Keep your feet flat on the floor. Dont turn your foot or leg inward. This stresses your hip joint.  Use an elevated toilet seat for 6 weeks after surgery.  Ask your healthcare provider if its okay to sleep on your stomach or on the side that has the new hip. Use pillows between your legs when sleeping on your back or on your side.  Sit on a firm cushion when you ride in a car and avoid sitting too low. Try not to bend your hip too much when getting in and out of the car.  Moving Safely  Dont bend at the hip when you bend over. Don't bend at the waist to put on socks and shoes. And avoid picking up items from the floor.  Use a cane, crutches, a walker, or handrails until your balance, flexibility, and strength improve. And remember to ask for help from others when you need it.  Free up your hands so that you can use them to keep balance. Use a petra pack, apron, or pockets to carry things.  Follow your doctors orders regarding how much weight to put on the affected leg.  Walk often and do prescribed exercises as instructed.  Arrange your household to keep the items you need within reach.  Remove electrical cords, throw rugs, and anything else that may cause you to fall.  Use nonslip bath mats, grab bars, an elevated toilet seat, and a shower chair in your bathroom.  Follow-Up  Make a follow-up appointment as directed by our staff.    When to Seek Medical Attention  Call 911 right away if you have any of the following:  Chest pain  Shortness of breath  Otherwise, call your doctor immediately if you have any of the following:  Increased hip pain  Pain or swelling in your calf or leg  Fever above 100.4°F or shaking chills  Excessive swelling, increased drainage or redness  around the incision  Swelling, tenderness, or cramps in your leg   © 7554-7823 [employer]hat[employer], [address] [place] [address], [place] [address]. All rights reserved. This information is not intended as a substitute for professional medical care. Always follow your healthcare professional's instructions.

## 2017-06-26 ENCOUNTER — OFFICE VISIT (OUTPATIENT)
Dept: UROLOGY | Facility: CLINIC | Age: 67
End: 2017-06-26
Payer: COMMERCIAL

## 2017-06-26 VITALS — HEIGHT: 67 IN | BODY MASS INDEX: 29.1 KG/M2 | WEIGHT: 185.44 LBS

## 2017-06-26 DIAGNOSIS — N40.1 BPH WITH URINARY OBSTRUCTION: Primary | ICD-10-CM

## 2017-06-26 DIAGNOSIS — N13.8 BPH WITH URINARY OBSTRUCTION: Primary | ICD-10-CM

## 2017-06-26 PROCEDURE — 99999 PR PBB SHADOW E&M-EST. PATIENT-LVL III: CPT | Mod: PBBFAC,,, | Performed by: UROLOGY

## 2017-06-26 PROCEDURE — 99203 OFFICE O/P NEW LOW 30 MIN: CPT | Mod: S$GLB,,, | Performed by: UROLOGY

## 2017-06-26 PROCEDURE — 51798 US URINE CAPACITY MEASURE: CPT | Mod: S$GLB,,, | Performed by: UROLOGY

## 2017-06-26 RX ORDER — HYDROCODONE BITARTRATE AND ACETAMINOPHEN 7.5; 325 MG/1; MG/1
TABLET ORAL
COMMUNITY
Start: 2017-06-21 | End: 2021-10-26

## 2017-06-26 NOTE — PROGRESS NOTES
Subjective:       Patient ID: Christophe Renteria is a 66 y.o. male who was referred by Self, Aaareferral    Chief Complaint:   Chief Complaint   Patient presents with    Follow-up     f/u with pvr       Urinary Retention  Patient complains of urinary retention. Onset of retention was 1 week ago and was gradual in onset. Patient currently does not have a urinary catheter in place.  600 ml of urine were drained when catheter was placed. Prior to this event voiding symptoms consisted of slow stream, hesitancy, intermittency. Prior treatments include tamulosin. Recent medications that may have affected his voiding include general anesthesia with a hip replacement.      Benign Prostatic Hyperplasia  He patient reports nocturia two times a night. He denies straining, urgency and weak stream. The patient states symptoms are of mild severity. Onset of symptoms was a few years ago and was gradual in onset.  He has no personal history and no family history of prostate cancer. He reports a history of no complicating symptoms. He denies flank pain, gross hematuria, kidney stones and recurrent UTI.  He is currently taking Flomax and bethanechol. .          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    Hypertension     Lumbar disc disease     Psoriatic arthritis     Renal disease        PAST SURGICAL HISTORY:  Past Surgical History:   Procedure Laterality Date    ANKLE SURGERY Right     BACK SURGERY      FRACTURE SURGERY      JOINT REPLACEMENT      Rt total hip    KNEE SURGERY  02/13/2017    ROTATOR CUFF REPAIR Right        SOCIAL HISTORY:  Social History   Substance Use Topics    Smoking  status: Former Smoker     Types: Pipe     Quit date: 1985    Smokeless tobacco: Never Used    Alcohol use 0.0 oz/week      Comment: occassional       FAMILY HISTORY:  Family History   Problem Relation Age of Onset    Cancer Mother     Hypertension Father     Stroke Father        ALLERGIES AND MEDICATIONS: updated and reviewed.  Review of patient's allergies indicates:  No Known Allergies  Current Outpatient Prescriptions   Medication Sig    amlodipine (NORVASC) 10 MG tablet Take 1 tablet (10 mg total) by mouth once daily.    aspirin 325 MG tablet Take 1 tablet (325 mg total) by mouth once daily.    famotidine (PEPCID) 20 MG tablet Take 1 tablet (20 mg total) by mouth 2 (two) times daily.    hydrocodone-acetaminophen 7.5-325mg (NORCO) 7.5-325 mg per tablet     multivitamin capsule Take 1 capsule by mouth once daily.    pravastatin (PRAVACHOL) 40 MG tablet Take 1 tablet (40 mg total) by mouth once daily.    tamsulosin (FLOMAX) 0.4 mg Cp24      No current facility-administered medications for this visit.        Review of Systems   Constitutional: Negative for activity change, fatigue, fever and unexpected weight change.   HENT: Negative for congestion.    Eyes: Negative for redness.   Respiratory: Negative for chest tightness and shortness of breath.    Cardiovascular: Negative for chest pain and leg swelling.   Gastrointestinal: Negative for abdominal pain, constipation, diarrhea, nausea and vomiting.   Genitourinary: Negative for dysuria, flank pain, frequency, hematuria, penile pain, penile swelling, scrotal swelling, testicular pain and urgency.   Musculoskeletal: Negative for arthralgias and back pain.   Neurological: Negative for dizziness and light-headedness.   Psychiatric/Behavioral: Negative for behavioral problems and confusion. The patient is not nervous/anxious.    All other systems reviewed and are negative.      Objective:      Vitals:    06/26/17 1307   Weight: 84.1 kg (185 lb 6.5 oz)  "  Height: 5' 7" (1.702 m)     Physical Exam   Nursing note and vitals reviewed.  Constitutional: He is oriented to person, place, and time. He appears well-developed and well-nourished.   HENT:   Head: Normocephalic.   Eyes: Conjunctivae are normal.   Neck: Normal range of motion. Neck supple. No tracheal deviation present. No thyromegaly present.   Cardiovascular: Normal rate and normal heart sounds.    Pulmonary/Chest: Effort normal and breath sounds normal. No respiratory distress. He has no wheezes.   Abdominal: Soft. Bowel sounds are normal. There is no hepatosplenomegaly. There is no tenderness. There is no rebound and no CVA tenderness. No hernia.   Genitourinary: Rectal exam shows no fissure, no tenderness and anal tone normal. Prostate is enlarged. Prostate is not tender.       Musculoskeletal: Normal range of motion. He exhibits no edema or tenderness.   Lymphadenopathy:     He has no cervical adenopathy.   Neurological: He is alert and oriented to person, place, and time.   Skin: Skin is warm and dry. No rash noted. No erythema.     Psychiatric: He has a normal mood and affect. His behavior is normal. Judgment and thought content normal.       Urine dipstick shows negative for all components.  Micro exam: negative for WBC's or RBC's.      PVR: 112 mL    Assessment:       1. BPH with urinary obstruction          Plan:       1. BPH with urinary obstruction  Stay on Flomax  Taper off Bethanechol    - US Retroperitoneal Complete (Kidney and; Future  - Prostate Specific Antigen, Diagnostic; Future            Return in about 3 months (around 9/26/2017) for Review X-ray, Follow up, Review PSA.  "

## 2017-06-28 ENCOUNTER — TELEPHONE (OUTPATIENT)
Dept: FAMILY MEDICINE | Facility: CLINIC | Age: 67
End: 2017-06-28

## 2017-07-07 ENCOUNTER — TELEPHONE (OUTPATIENT)
Dept: SLEEP MEDICINE | Facility: CLINIC | Age: 67
End: 2017-07-07

## 2017-07-07 NOTE — TELEPHONE ENCOUNTER
Reviewed CPAP usage on [facility] and discussed with patient. No issues/complaints at this time.     Compliance Summary Days with Device Usage: 30 days Percentage of Days >=4 Hours: 100.0% Average Usage (Days Used): 8 hrs. 31 mins. 28 secs. Average Usage (All Days): 8 hrs. 31 mins. 28 secs.    Apnea Indices Average AHI: 6.8 Average OA Index: 1.7 Average CA Index: 0.7       Large Leak Average Time in Large Leak: 33 mins. 10 secs. Average % of Night in Large Leak: 6.5%     Periodic Breathing Average % of Night in PB: 1.8%     Despite AHI of 6.8, reports complete resolution of sleep symptoms.     Encouraged to call regarding issues/concerns/questions in the interim.

## 2017-07-20 LAB
ACID FAST MOD KINY STN SPEC: NORMAL
MYCOBACTERIUM SPEC QL CULT: NORMAL

## 2017-09-13 ENCOUNTER — HOSPITAL ENCOUNTER (OUTPATIENT)
Dept: RADIOLOGY | Facility: HOSPITAL | Age: 67
Discharge: HOME | End: 2017-09-13
Attending: UROLOGY
Payer: COMMERCIAL

## 2017-09-13 DIAGNOSIS — N40.1 BPH WITH URINARY OBSTRUCTION: ICD-10-CM

## 2017-09-13 DIAGNOSIS — N13.8 BPH WITH URINARY OBSTRUCTION: ICD-10-CM

## 2017-09-13 PROCEDURE — 76770 US EXAM ABDO BACK WALL COMP: CPT | Mod: 26,,, | Performed by: RADIOLOGY

## 2017-09-13 PROCEDURE — 76770 US EXAM ABDO BACK WALL COMP: CPT | Mod: TC

## 2017-09-26 ENCOUNTER — OFFICE VISIT (OUTPATIENT)
Dept: UROLOGY | Facility: CLINIC | Age: 67
End: 2017-09-26
Payer: COMMERCIAL

## 2017-09-26 VITALS — HEIGHT: 67 IN | BODY MASS INDEX: 29.03 KG/M2 | WEIGHT: 185 LBS

## 2017-09-26 DIAGNOSIS — N40.1 BPH WITH OBSTRUCTION/LOWER URINARY TRACT SYMPTOMS: Primary | ICD-10-CM

## 2017-09-26 DIAGNOSIS — N13.8 BPH WITH OBSTRUCTION/LOWER URINARY TRACT SYMPTOMS: Primary | ICD-10-CM

## 2017-09-26 DIAGNOSIS — N52.9 ED (ERECTILE DYSFUNCTION) OF ORGANIC ORIGIN: ICD-10-CM

## 2017-09-26 DIAGNOSIS — R33.9 INCOMPLETE EMPTYING OF BLADDER: ICD-10-CM

## 2017-09-26 PROCEDURE — 99213 OFFICE O/P EST LOW 20 MIN: CPT | Mod: S$GLB,,, | Performed by: UROLOGY

## 2017-09-26 PROCEDURE — 99999 PR PBB SHADOW E&M-EST. PATIENT-LVL III: CPT | Mod: PBBFAC,,, | Performed by: UROLOGY

## 2017-09-26 PROCEDURE — 3008F BODY MASS INDEX DOCD: CPT | Mod: S$GLB,,, | Performed by: UROLOGY

## 2017-09-26 RX ORDER — SILDENAFIL 100 MG/1
100 TABLET, FILM COATED ORAL DAILY PRN
Qty: 6 TABLET | Refills: 11 | Status: SHIPPED | OUTPATIENT
Start: 2017-09-26 | End: 2017-10-11 | Stop reason: SDUPTHER

## 2017-09-26 NOTE — PROGRESS NOTES
Subjective:       Patient ID: Christophe Renteria is a 67 y.o. male who was last seen in this office 6/26/2017    Chief Complaint:   Chief Complaint   Patient presents with    Benign Prostatic Hypertrophy     3 month f/u with ultrasound an psa        Urinary Retention  Patient complains of urinary retention. Onset of retention was 1 week ago and was gradual in onset. Patient currently does not have a urinary catheter in place.  600 ml of urine were drained when catheter was placed. Prior to this event voiding symptoms consisted of slow stream, hesitancy, intermittency. Prior treatments include tamulosin. Recent medications that may have affected his voiding include general anesthesia with a hip replacement.    He is back with an ultrasound and PSA.      Benign Prostatic Hyperplasia  He patient reports nocturia two times a night. He denies straining, urgency and weak stream. The patient states symptoms are of mild severity. Onset of symptoms was a few years ago and was gradual in onset.  He has no personal history and no family history of prostate cancer. He reports a history of no complicating symptoms. He denies flank pain, gross hematuria, kidney stones and recurrent UTI.  He is currently taking Flomax.    Erectile Dysfunction  Patient complains of erectile dysfunction. Onset of dysfunction was several years ago and was gradual in onset.  Patient states the nature of difficulty is maintaining erection. Full erections occur with intercourse. Partial erections occur with intercourse. Libido is not affected. Risk factors for ED include cardiovascular disease. Patient denies history of pelvic radiation. Previous treatment of ED includes Cialis.      .    ACTIVE MEDICAL ISSUES:  Patient Active Problem List   Diagnosis    Lumbar disc disease    Hyperlipidemia    Psoriatic arthritis    Hypertension    HTN (hypertension)    SAMANTHA (obstructive sleep apnea)    Osteoarthritis of left hip       ALLERGIES AND  "MEDICATIONS: updated and reviewed.  Review of patient's allergies indicates:  No Known Allergies  Current Outpatient Prescriptions   Medication Sig    amlodipine (NORVASC) 10 MG tablet Take 1 tablet (10 mg total) by mouth once daily.    multivitamin capsule Take 1 capsule by mouth once daily.    pravastatin (PRAVACHOL) 40 MG tablet Take 1 tablet (40 mg total) by mouth once daily.    tamsulosin (FLOMAX) 0.4 mg Cp24     aspirin 325 MG tablet Take 1 tablet (325 mg total) by mouth once daily.    famotidine (PEPCID) 20 MG tablet Take 1 tablet (20 mg total) by mouth 2 (two) times daily.    hydrocodone-acetaminophen 7.5-325mg (NORCO) 7.5-325 mg per tablet     sildenafil (VIAGRA) 100 MG tablet Take 1 tablet (100 mg total) by mouth daily as needed.     No current facility-administered medications for this visit.        Review of Systems   Constitutional: Negative for activity change, fatigue, fever and unexpected weight change.   HENT: Negative for congestion.    Eyes: Negative for redness.   Respiratory: Negative for chest tightness and shortness of breath.    Cardiovascular: Negative for chest pain and leg swelling.   Gastrointestinal: Negative for abdominal pain, constipation, diarrhea, nausea and vomiting.   Genitourinary: Negative for dysuria, flank pain, frequency, hematuria, penile pain, penile swelling, scrotal swelling, testicular pain and urgency.   Musculoskeletal: Negative for arthralgias and back pain.   Neurological: Negative for dizziness and light-headedness.   Psychiatric/Behavioral: Negative for behavioral problems and confusion. The patient is not nervous/anxious.    All other systems reviewed and are negative.      Objective:      Vitals:    09/26/17 1525   Weight: 83.9 kg (185 lb)   Height: 5' 7" (1.702 m)     Physical Exam   Nursing note and vitals reviewed.  Constitutional: He is oriented to person, place, and time. He appears well-developed and well-nourished.   HENT:   Head: Normocephalic. "   Eyes: Conjunctivae are normal.   Neck: Normal range of motion. Neck supple. No tracheal deviation present. No thyromegaly present.   Cardiovascular: Normal rate and normal heart sounds.    Pulmonary/Chest: Effort normal and breath sounds normal. No respiratory distress. He has no wheezes.   Abdominal: Soft. Bowel sounds are normal. There is no hepatosplenomegaly. There is no tenderness. There is no rebound and no CVA tenderness. No hernia.   Musculoskeletal: Normal range of motion. He exhibits no edema or tenderness.   Lymphadenopathy:     He has no cervical adenopathy.   Neurological: He is alert and oriented to person, place, and time.   Skin: Skin is warm and dry. No rash noted. No erythema.     Psychiatric: He has a normal mood and affect. His behavior is normal. Judgment and thought content normal.       PSA in media tab: 1.6    Assessment:       1. BPH with obstruction/lower urinary tract symptoms    2. Incomplete emptying of bladder    3. ED (erectile dysfunction) of organic origin          Plan:       1. BPH with obstruction/lower urinary tract symptoms  Stay on Flomax  - Prostate Specific Antigen, Diagnostic; Future    2. Incomplete emptying of bladder      3. ED (erectile dysfunction) of organic origin    - sildenafil (VIAGRA) 100 MG tablet; Take 1 tablet (100 mg total) by mouth daily as needed.  Dispense: 6 tablet; Refill: 11            Return in about 1 year (around 9/26/2018) for Follow up, Review PSA.

## 2017-10-11 ENCOUNTER — TELEPHONE (OUTPATIENT)
Dept: UROLOGY | Facility: HOSPITAL | Age: 67
End: 2017-10-11

## 2017-10-11 DIAGNOSIS — N52.9 ED (ERECTILE DYSFUNCTION) OF ORGANIC ORIGIN: ICD-10-CM

## 2017-10-11 RX ORDER — SILDENAFIL 100 MG/1
100 TABLET, FILM COATED ORAL DAILY PRN
Qty: 10 TABLET | Refills: 11 | Status: SHIPPED | OUTPATIENT
Start: 2017-10-11

## 2018-03-14 ENCOUNTER — OFFICE VISIT (OUTPATIENT)
Dept: FAMILY MEDICINE | Facility: CLINIC | Age: 68
End: 2018-03-14
Payer: COMMERCIAL

## 2018-03-14 ENCOUNTER — LAB VISIT (OUTPATIENT)
Dept: LAB | Facility: HOSPITAL | Age: 68
End: 2018-03-14
Attending: INTERNAL MEDICINE
Payer: COMMERCIAL

## 2018-03-14 VITALS
TEMPERATURE: 99 F | BODY MASS INDEX: 28.7 KG/M2 | OXYGEN SATURATION: 98 % | DIASTOLIC BLOOD PRESSURE: 80 MMHG | HEIGHT: 66 IN | SYSTOLIC BLOOD PRESSURE: 138 MMHG | HEART RATE: 87 BPM | WEIGHT: 178.56 LBS

## 2018-03-14 DIAGNOSIS — N18.30 STAGE 3 CHRONIC KIDNEY DISEASE: ICD-10-CM

## 2018-03-14 DIAGNOSIS — G47.33 OSA (OBSTRUCTIVE SLEEP APNEA): ICD-10-CM

## 2018-03-14 DIAGNOSIS — D64.9 ANEMIA, UNSPECIFIED TYPE: ICD-10-CM

## 2018-03-14 DIAGNOSIS — E78.5 HYPERLIPIDEMIA, UNSPECIFIED HYPERLIPIDEMIA TYPE: ICD-10-CM

## 2018-03-14 DIAGNOSIS — D86.9 SARCOIDOSIS: ICD-10-CM

## 2018-03-14 DIAGNOSIS — N40.0 BENIGN PROSTATIC HYPERPLASIA, UNSPECIFIED WHETHER LOWER URINARY TRACT SYMPTOMS PRESENT: ICD-10-CM

## 2018-03-14 DIAGNOSIS — N52.9 ERECTILE DYSFUNCTION, UNSPECIFIED ERECTILE DYSFUNCTION TYPE: ICD-10-CM

## 2018-03-14 DIAGNOSIS — I10 HYPERTENSION, UNSPECIFIED TYPE: ICD-10-CM

## 2018-03-14 DIAGNOSIS — Z86.010 HISTORY OF COLONIC POLYPS: ICD-10-CM

## 2018-03-14 DIAGNOSIS — Z00.00 ROUTINE MEDICAL EXAM: ICD-10-CM

## 2018-03-14 DIAGNOSIS — Z00.00 ROUTINE MEDICAL EXAM: Primary | ICD-10-CM

## 2018-03-14 LAB
25(OH)D3+25(OH)D2 SERPL-MCNC: 35 NG/ML
ALBUMIN SERPL BCP-MCNC: 3.5 G/DL
ALP SERPL-CCNC: 54 U/L
ALT SERPL W/O P-5'-P-CCNC: 28 U/L
ANION GAP SERPL CALC-SCNC: 8 MMOL/L
AST SERPL-CCNC: 27 U/L
BASOPHILS # BLD AUTO: 0.07 K/UL
BASOPHILS NFR BLD: 1.1 %
BILIRUB SERPL-MCNC: 0.5 MG/DL
BUN SERPL-MCNC: 30 MG/DL
CALCIUM SERPL-MCNC: 9.7 MG/DL
CHLORIDE SERPL-SCNC: 107 MMOL/L
CHOLEST SERPL-MCNC: 137 MG/DL
CHOLEST/HDLC SERPL: 2.7 {RATIO}
CO2 SERPL-SCNC: 25 MMOL/L
COMPLEXED PSA SERPL-MCNC: 1.2 NG/ML
CREAT SERPL-MCNC: 2.4 MG/DL
DIFFERENTIAL METHOD: ABNORMAL
EOSINOPHIL # BLD AUTO: 0.2 K/UL
EOSINOPHIL NFR BLD: 3 %
ERYTHROCYTE [DISTWIDTH] IN BLOOD BY AUTOMATED COUNT: 13.1 %
EST. GFR  (AFRICAN AMERICAN): 31.5 ML/MIN/1.73 M^2
EST. GFR  (NON AFRICAN AMERICAN): 27.3 ML/MIN/1.73 M^2
ESTIMATED AVG GLUCOSE: 97 MG/DL
FERRITIN SERPL-MCNC: 172 NG/ML
GLUCOSE SERPL-MCNC: 152 MG/DL
HBA1C MFR BLD HPLC: 5 %
HCT VFR BLD AUTO: 40 %
HDLC SERPL-MCNC: 50 MG/DL
HDLC SERPL: 36.5 %
HGB BLD-MCNC: 13.1 G/DL
IMM GRANULOCYTES # BLD AUTO: 0.05 K/UL
IMM GRANULOCYTES NFR BLD AUTO: 0.8 %
IRON SERPL-MCNC: 93 UG/DL
LDLC SERPL CALC-MCNC: 70.8 MG/DL
LYMPHOCYTES # BLD AUTO: 1.3 K/UL
LYMPHOCYTES NFR BLD: 20.1 %
MCH RBC QN AUTO: 31.1 PG
MCHC RBC AUTO-ENTMCNC: 32.8 G/DL
MCV RBC AUTO: 95 FL
MONOCYTES # BLD AUTO: 0.5 K/UL
MONOCYTES NFR BLD: 7.9 %
NEUTROPHILS # BLD AUTO: 4.3 K/UL
NEUTROPHILS NFR BLD: 67.1 %
NONHDLC SERPL-MCNC: 87 MG/DL
NRBC BLD-RTO: 0 /100 WBC
PLATELET # BLD AUTO: 224 K/UL
PMV BLD AUTO: 11.4 FL
POTASSIUM SERPL-SCNC: 3.8 MMOL/L
PROT SERPL-MCNC: 6.5 G/DL
RBC # BLD AUTO: 4.21 M/UL
SATURATED IRON: 35 %
SODIUM SERPL-SCNC: 140 MMOL/L
TOTAL IRON BINDING CAPACITY: 262 UG/DL
TRANSFERRIN SERPL-MCNC: 177 MG/DL
TRIGL SERPL-MCNC: 81 MG/DL
TSH SERPL DL<=0.005 MIU/L-ACNC: 1.44 UIU/ML
WBC # BLD AUTO: 6.42 K/UL

## 2018-03-14 PROCEDURE — 90471 IMMUNIZATION ADMIN: CPT | Mod: S$GLB,,, | Performed by: INTERNAL MEDICINE

## 2018-03-14 PROCEDURE — 85025 COMPLETE CBC W/AUTO DIFF WBC: CPT

## 2018-03-14 PROCEDURE — 90670 PCV13 VACCINE IM: CPT | Mod: S$GLB,,, | Performed by: INTERNAL MEDICINE

## 2018-03-14 PROCEDURE — 80061 LIPID PANEL: CPT

## 2018-03-14 PROCEDURE — 84153 ASSAY OF PSA TOTAL: CPT

## 2018-03-14 PROCEDURE — 36415 COLL VENOUS BLD VENIPUNCTURE: CPT | Mod: PO

## 2018-03-14 PROCEDURE — 80053 COMPREHEN METABOLIC PANEL: CPT

## 2018-03-14 PROCEDURE — 99397 PER PM REEVAL EST PAT 65+ YR: CPT | Mod: 25,S$GLB,, | Performed by: INTERNAL MEDICINE

## 2018-03-14 PROCEDURE — 99999 PR PBB SHADOW E&M-EST. PATIENT-LVL III: CPT | Mod: PBBFAC,,, | Performed by: INTERNAL MEDICINE

## 2018-03-14 PROCEDURE — 83540 ASSAY OF IRON: CPT

## 2018-03-14 PROCEDURE — 84443 ASSAY THYROID STIM HORMONE: CPT

## 2018-03-14 PROCEDURE — 82306 VITAMIN D 25 HYDROXY: CPT

## 2018-03-14 PROCEDURE — 82728 ASSAY OF FERRITIN: CPT

## 2018-03-14 PROCEDURE — 83036 HEMOGLOBIN GLYCOSYLATED A1C: CPT

## 2018-03-14 RX ORDER — HYDROXYCHLOROQUINE SULFATE 200 MG/1
TABLET, FILM COATED ORAL
COMMUNITY
Start: 2018-01-08

## 2018-03-14 NOTE — PROGRESS NOTES
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  His chart was reviewed and problem list and history updated by myself. He is followed by urology for BPH but I don't see a PSA.  Labs reviewed and looks like he has had some kidney disease which by biopsy3 years ago was felt to be obstructive but then had another biopsy at [place] which showed renal sarcoid.  Apparently no sarcoid anywhere else.  Treated with high-dose steroids and now on Plaquenil.  Followed by Dr. Lakhani  He had some anemia associated with the admissions for his surgeries/joint replacements.he inquires about vaccinations and we discussed upcoming shingles vaccine and the new future we'll provide her with Prevnar today.        ROS:   CONST: weight stable. EYES: no vision change. ENT: no sore throat. CV: no chest pain w/ exertion. RESP: no shortness of breath. GI: no nausea, vomiting, diarrhea. No dysphagia. : no urinary issues. MUSCULOSKELETAL: no new myalgias or arthralgias. SKIN: no new changes. NEURO: no focal deficits. PSYCH: no new issues. ENDOCRINE: no polyuria. HEME: no lymph nodes. ALLERGY: no general pruritis.    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 Lakhani   Left knee replaced  Colon polyps, Dr. Alberto, due 2018    Past Surgical History:   Procedure Laterality Date    ANKLE SURGERY Right 2012    right subtalar  arthrodesis -Treuting    BACK SURGERY      FRACTURE SURGERY      JOINT REPLACEMENT      Rt total hip    KNEE SURGERY  02/13/2017    TKA    ROTATOR CUFF REPAIR Right      Social History     Social History    Marital status:      Spouse name: N/A    Number of children: N/A    Years of education: N/A     Occupational History     / [employer]     Social History Main Topics    Smoking status: Former Smoker     Types: Pipe     Quit date: 1985    Smokeless tobacco: Never Used    Alcohol use 0.0 oz/week      Comment: occassional    Drug use: No    Sexual activity: Not on file     Other Topics Concern    Not on file     Social History Narrative    No narrative on file     family history includes Cancer in his mother; Hypertension in his father; Stroke in his father.      Gen: no distress  EYES: conjunctiva clear, non-icteric, PERRL  ENT: nose clear, nasal mucosa normal, oropharynx clear and moist, teeth good  NECK:supple, thyroid non-palpable  RESP: effort is good, lungs clear  CV: heart RRR w/o murmur, gallops or rubs; no carotid bruits, no edema  GI: abdomen soft, non-distended, non-tender, no hepatosplenomegaly  MS: gait w limp, no clubbing or cyanosis of the digits  SKIN: no rashes, warm to touch      Christophe was seen today for establish care.    Diagnoses and all orders for this visit:    Routine medical exam, new to me, provide Prevnar, he will follow-up with GI for his colonoscopy, update PSA which does not look like it has been done  -     Comprehensive metabolic panel; Future  -     Hemoglobin A1c; Future  -     CBC auto differential; Future  -     Vitamin D; Future  -     TSH; Future  -     Lipid panel; Future  -     PSA, Screening; Future  -     Iron and TIBC; Future  -     Ferritin; Future    SAMANTHA (obstructive sleep apnea)    Benign prostatic hyperplasia, unspecified whether lower urinary tract symptoms present, follows with urology  -     PSA, Screening; Future    Hypertension,  "unspecified type chronic and stable    Hyperlipidemia, unspecified hyperlipidemia type, reassess on statin    Anemia, unspecified type, likely somewhat postoperative by what I canview but suspect there must be some chronicity due to the renal insufficiency    Erectile dysfunction, unspecified erectile dysfunction type    Stage 3 chronic kidney disease reassess but also keep follow-up withnephrology    Sarcoidosis, on Plaquenil    History of colonic polyps, follows with GI    Other orders  -     Pneumococcal Conjugate Vaccine (13 Valent) (IM)    note be sensitive so as to not cause any issues if there are any discrepancies in the available history"This note will not be shared with the patient."  "

## 2018-03-16 DIAGNOSIS — Z13.6 SCREENING FOR AAA (AORTIC ABDOMINAL ANEURYSM): ICD-10-CM

## 2018-04-09 ENCOUNTER — TELEPHONE (OUTPATIENT)
Dept: SLEEP MEDICINE | Facility: CLINIC | Age: 68
End: 2018-04-09

## 2018-04-09 NOTE — TELEPHONE ENCOUNTER
----- Message from Lorena John sent at 4/9/2018  9:34 AM CDT -----  Contact: miguel            Name of Who is Calling: miguel      What is the request in detail: wants to discuss portable unit. Call pt      Can the clinic reply by [facility]: no      What [facility]: [phone]

## 2018-04-09 NOTE — TELEPHONE ENCOUNTER
There is no CPAP machine that can be charged up for use without being plugged into the wall.     Unfortunately, I am inexperienced with travel CPAP machines and he may browse online vendors ie [email or web address] for options.     I have many patients who travel to [place] with same unit that he has without troubles. They simply need to get the actual plug in for the wall compatible for where he is going.     If he prefers to purchase a specific travel machine for upcoming trip, I can write prescription. But, I don't have any specifics on which to get.

## 2018-05-31 ENCOUNTER — TELEPHONE (OUTPATIENT)
Dept: SLEEP MEDICINE | Facility: CLINIC | Age: 68
End: 2018-05-31

## 2018-05-31 DIAGNOSIS — G47.33 OSA (OBSTRUCTIVE SLEEP APNEA): Primary | ICD-10-CM

## 2018-05-31 NOTE — TELEPHONE ENCOUNTER
----- Message from Lorraine García sent at 5/30/2018  4:10 PM CDT -----  Regarding: cpap supplies  Hi, can I get a new cpap supply order put in for mr. hernandez he uses a full face mask. thanks

## 2018-06-14 DIAGNOSIS — Z11.59 NEED FOR HEPATITIS C SCREENING TEST: ICD-10-CM

## 2019-04-09 ENCOUNTER — CLINICAL SUPPORT (OUTPATIENT)
Dept: AUDIOLOGY | Facility: CLINIC | Age: 69
End: 2019-04-09
Payer: COMMERCIAL

## 2019-04-09 DIAGNOSIS — H90.3 SENSORINEURAL HEARING LOSS, BILATERAL: Primary | ICD-10-CM

## 2019-04-09 PROCEDURE — 92550 TYMPANOMETRY & REFLEX THRESH: CPT | Mod: S$GLB,,, | Performed by: AUDIOLOGIST

## 2019-04-09 PROCEDURE — 92557 COMPREHENSIVE HEARING TEST: CPT | Mod: S$GLB,,, | Performed by: AUDIOLOGIST

## 2019-04-09 PROCEDURE — 92557 PR COMPREHENSIVE HEARING TEST: ICD-10-PCS | Mod: S$GLB,,, | Performed by: AUDIOLOGIST

## 2019-04-09 PROCEDURE — 92550 PR TYMPANOMETRY AND REFLEX THRESHOLD MEASUREMENTS: ICD-10-PCS | Mod: S$GLB,,, | Performed by: AUDIOLOGIST

## 2019-04-09 NOTE — PROGRESS NOTES
Click the link below to view the audiogram in full:  Document on 4/9/2019  4:03 PM by RIC NavaD: Audiogram    Findings:      Pt seen today for hearing test.  He reported that his left ear felt clogged and he has been using the valsalva to clear the pressure.   Today's testing revealed normal sloping to moderate-severe SNHL in the right ear and normal sloping to severe SNHL in the left ear.  Immittance testing revealed normal middle ear function.  Hearing loss was discussed with the patient and hearing aids were briefly discussed.  Pt will follow up annually for hearing testing and if he chooses to pursue hearing aids.

## 2019-05-30 ENCOUNTER — CLINICAL SUPPORT (OUTPATIENT)
Dept: AUDIOLOGY | Facility: CLINIC | Age: 69
End: 2019-05-30
Payer: COMMERCIAL

## 2019-05-30 DIAGNOSIS — H90.3 SENSORINEURAL HEARING LOSS, BILATERAL: Primary | ICD-10-CM

## 2019-05-30 PROCEDURE — 99499 UNLISTED E&M SERVICE: CPT | Mod: S$GLB,,, | Performed by: AUDIOLOGIST

## 2019-05-30 PROCEDURE — 99499 NO LOS: ICD-10-PCS | Mod: S$GLB,,, | Performed by: AUDIOLOGIST

## 2019-05-30 NOTE — PROGRESS NOTES
Christophe Renteria was seen today for a hearing aid consultation.  We discussed the patients hearing loss and its effects on his daily life in detail.  Pt has been wearing trial aids for several weeks.  Hearing aid options were presented.  Pt selected premium aids.  A hearing aid order form was completed and signed.  Pt will be contacted and scheduled for a hearing aid evaluation once order is received.

## 2019-06-06 ENCOUNTER — CLINICAL SUPPORT (OUTPATIENT)
Dept: AUDIOLOGY | Facility: CLINIC | Age: 69
End: 2019-06-06

## 2019-06-06 DIAGNOSIS — H90.3 SENSORINEURAL HEARING LOSS, BILATERAL: Primary | ICD-10-CM

## 2019-06-06 PROCEDURE — 99499 UNLISTED E&M SERVICE: CPT | Mod: S$GLB,,, | Performed by: AUDIOLOGIST

## 2019-06-06 PROCEDURE — 99499 NO LOS: ICD-10-PCS | Mod: S$GLB,,, | Performed by: AUDIOLOGIST

## 2019-06-06 NOTE — PROGRESS NOTES
Date:  6/6/2019    Patient:  Christophe Renteria    Pt seen today for a hearing aid evaluation.  He was fit with the following device.      Right ear:  Serial number:  [id number]    :  [employer]  Model:  Audeo M90-R  Type: TIA   Color:  Graphite Grey  Battery size:  Rechargable  Tube length:  1 M  Speaker power:  Medium  Dome size and style:  Large open  Warranty expiration:  9/1/22  L and D expiration:  9/1/22    Left ear:  Serial number:  [id number]  :  [employer]ak  Model:  Audeo M90-R  Type: TIA   Color:  Graphite Grey  Battery size:  Rechargable  Tube length:  1 M  Speaker power:  Medium  Dome size and style:  Large open  Warranty expiration:  9/1/22  L and D expiration:  9/1/22    Fit good and patient reported good subjective benefit.  Proper care and maintenance was discussed.  Purchase agreement was reviewed and signed.  Pt should follow up in two weeks.

## 2019-09-06 ENCOUNTER — TELEPHONE (OUTPATIENT)
Dept: AUDIOLOGY | Facility: CLINIC | Age: 69
End: 2019-09-06

## 2019-09-06 NOTE — TELEPHONE ENCOUNTER
----- Message from Vikki Galdamez sent at 9/4/2019  9:36 AM CDT -----  Contact: Self  Type: Patient Call Back    Who called:Dr. Renteria    What is the request in detail:Patient called to speak with audiologist. Please call    Can the clinic reply by [facility]?    Would the patient rather a call back or a response via [facility]? Call    Best call back number:[phone]

## 2019-09-10 ENCOUNTER — OFFICE VISIT (OUTPATIENT)
Dept: FAMILY MEDICINE | Facility: CLINIC | Age: 69
End: 2019-09-10
Payer: COMMERCIAL

## 2019-09-10 VITALS
BODY MASS INDEX: 28.6 KG/M2 | DIASTOLIC BLOOD PRESSURE: 72 MMHG | HEIGHT: 66 IN | TEMPERATURE: 98 F | WEIGHT: 177.94 LBS | OXYGEN SATURATION: 97 % | SYSTOLIC BLOOD PRESSURE: 140 MMHG | HEART RATE: 84 BPM

## 2019-09-10 DIAGNOSIS — I10 HYPERTENSION, UNSPECIFIED TYPE: ICD-10-CM

## 2019-09-10 DIAGNOSIS — Z00.00 ROUTINE MEDICAL EXAM: Primary | ICD-10-CM

## 2019-09-10 DIAGNOSIS — E78.5 HYPERLIPIDEMIA, UNSPECIFIED HYPERLIPIDEMIA TYPE: ICD-10-CM

## 2019-09-10 DIAGNOSIS — Z86.010 HISTORY OF COLONIC POLYPS: ICD-10-CM

## 2019-09-10 DIAGNOSIS — N40.0 BENIGN PROSTATIC HYPERPLASIA, UNSPECIFIED WHETHER LOWER URINARY TRACT SYMPTOMS PRESENT: ICD-10-CM

## 2019-09-10 DIAGNOSIS — D64.9 ANEMIA, UNSPECIFIED TYPE: ICD-10-CM

## 2019-09-10 DIAGNOSIS — D86.9 SARCOIDOSIS: ICD-10-CM

## 2019-09-10 DIAGNOSIS — Z12.5 SCREENING FOR PROSTATE CANCER: ICD-10-CM

## 2019-09-10 DIAGNOSIS — G47.33 OSA (OBSTRUCTIVE SLEEP APNEA): ICD-10-CM

## 2019-09-10 DIAGNOSIS — N18.4 STAGE 4 CHRONIC KIDNEY DISEASE: ICD-10-CM

## 2019-09-10 PROCEDURE — 90662 IIV NO PRSV INCREASED AG IM: CPT | Mod: S$GLB,,, | Performed by: INTERNAL MEDICINE

## 2019-09-10 PROCEDURE — 90471 IMMUNIZATION ADMIN: CPT | Mod: S$GLB,,, | Performed by: INTERNAL MEDICINE

## 2019-09-10 PROCEDURE — 90732 PNEUMOCOCCAL POLYSACCHARIDE VACCINE 23-VALENT =>2YO SQ IM: ICD-10-PCS | Mod: S$GLB,,, | Performed by: INTERNAL MEDICINE

## 2019-09-10 PROCEDURE — 90472 PNEUMOCOCCAL POLYSACCHARIDE VACCINE 23-VALENT =>2YO SQ IM: ICD-10-PCS | Mod: S$GLB,,, | Performed by: INTERNAL MEDICINE

## 2019-09-10 PROCEDURE — 3077F SYST BP >= 140 MM HG: CPT | Mod: CPTII,S$GLB,, | Performed by: INTERNAL MEDICINE

## 2019-09-10 PROCEDURE — 99999 PR PBB SHADOW E&M-EST. PATIENT-LVL III: CPT | Mod: PBBFAC,,, | Performed by: INTERNAL MEDICINE

## 2019-09-10 PROCEDURE — 90472 IMMUNIZATION ADMIN EACH ADD: CPT | Mod: S$GLB,,, | Performed by: INTERNAL MEDICINE

## 2019-09-10 PROCEDURE — 90471 FLU VACCINE - HIGH DOSE (65+) PRESERVATIVE FREE IM: ICD-10-PCS | Mod: S$GLB,,, | Performed by: INTERNAL MEDICINE

## 2019-09-10 PROCEDURE — 99999 PR PBB SHADOW E&M-EST. PATIENT-LVL III: ICD-10-PCS | Mod: PBBFAC,,, | Performed by: INTERNAL MEDICINE

## 2019-09-10 PROCEDURE — 3078F DIAST BP <80 MM HG: CPT | Mod: CPTII,S$GLB,, | Performed by: INTERNAL MEDICINE

## 2019-09-10 PROCEDURE — 3078F PR MOST RECENT DIASTOLIC BLOOD PRESSURE < 80 MM HG: ICD-10-PCS | Mod: CPTII,S$GLB,, | Performed by: INTERNAL MEDICINE

## 2019-09-10 PROCEDURE — 3077F PR MOST RECENT SYSTOLIC BLOOD PRESSURE >= 140 MM HG: ICD-10-PCS | Mod: CPTII,S$GLB,, | Performed by: INTERNAL MEDICINE

## 2019-09-10 PROCEDURE — 90732 PPSV23 VACC 2 YRS+ SUBQ/IM: CPT | Mod: S$GLB,,, | Performed by: INTERNAL MEDICINE

## 2019-09-10 PROCEDURE — 90662 FLU VACCINE - HIGH DOSE (65+) PRESERVATIVE FREE IM: ICD-10-PCS | Mod: S$GLB,,, | Performed by: INTERNAL MEDICINE

## 2019-09-10 PROCEDURE — 99397 PR PREVENTIVE VISIT,EST,65 & OVER: ICD-10-PCS | Mod: 25,S$GLB,, | Performed by: INTERNAL MEDICINE

## 2019-09-10 PROCEDURE — 99397 PER PM REEVAL EST PAT 65+ YR: CPT | Mod: 25,S$GLB,, | Performed by: INTERNAL MEDICINE

## 2019-09-10 NOTE — PROGRESS NOTES
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  His chart was reviewed and problem list and history updated by myself. He is followed by urology for BPH but I don't see a PSA.  Labs reviewed and looks like he has had some kidney disease which by biopsy3 years ago was felt to be obstructive but then had another biopsy at [place] which showed renal sarcoid.  Apparently no sarcoid anywhere else.  Treated with high-dose steroids and now on Plaquenil.  Followed by Dr. Lakhani  He had some anemia associated with the admissions for his surgeries/joint replacements.  He was told by Nephrology that they only need to see him once a year.  He has not yet had his colonoscopy with any wrote himself a reminder to call [facility] to get that set up      Now working part time.  He is interested in getting the shingles vaccine which he will get a pharmacy.  I will give him his flu shot and his Pneumovax today.        ROS:   CONST: weight stable. EYES: no vision change. ENT: no sore throat. CV: no chest pain w/ exertion. RESP: no shortness of breath. GI: no nausea, vomiting, diarrhea. No dysphagia. : no urinary issues. MUSCULOSKELETAL: no new myalgias or arthralgias. SKIN: no new changes. NEURO: no focal deficits. PSYCH: no new issues. ENDOCRINE: no polyuria. HEME: no lymph nodes. ALLERGY: no general pruritis.    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 Lakhani   Left knee replaced  Colon polyps, Dr. Alberto, due 2018    Past Surgical History:   Procedure Laterality Date    ANKLE SURGERY Right     right subtalar arthrodesis -Treuting    ARTHROPLASTY-HIP (ANTERIOR APPROACH) Left 2017    [facility]    ARTHROPLASTY-KNEE Left 2017    [facility]    BACK SURGERY      ENXEGX-LTHYJV-OH-KIDNEY Right 2015    [facility]    FRACTURE SURGERY      JOINT REPLACEMENT      Rt total hip    KNEE SURGERY  2017    TKA    ROTATOR CUFF REPAIR Right      Social History     Socioeconomic History    Marital status:      Spouse name: Not on file    Number of children: Not on file    Years of education: Not on file    Highest education level: Not on file   Occupational History     Employer: / [employer]   Social Needs    Financial resource strain: Not hard at all    Food insecurity:     Worry: Never true     Inability: Never true    Transportation needs:     Medical: No     Non-medical: No   Tobacco Use    Smoking status: Former Smoker     Types: Pipe     Last attempt to quit: 1985     Years since quittin.7    Smokeless tobacco: Never Used   Substance and Sexual Activity    Alcohol use: Yes     Alcohol/week: 0.0 oz     Frequency: 2-3 times a week     Drinks per session: 1 or 2     Binge frequency: Never     Comment: occassional    Drug use: No    Sexual activity: Not on file   Lifestyle    Physical activity:     Days per week: 3 days     Minutes per session: 90 min    Stress: Not at all   Relationships    Social connections:     Talks on phone: More than three times a week     Gets together: Twice a week     Attends [employer] service: Not on file     Active member of club or organization: Patient refused     Attends meetings of clubs or organizations: Patient refused     Relationship status:  "   Other Topics Concern    Not on file   Social History Narrative    Not on file     family history includes Cancer in his mother; Hypertension in his father; Stroke in his father.      Gen: no distress  EYES: conjunctiva clear, non-icteric, PERRL  ENT: nose clear, nasal mucosa normal, oropharynx clear and moist, teeth good  NECK:supple, thyroid non-palpable  RESP: effort is good, lungs clear  CV: heart RRR w/o murmur, gallops or rubs; no carotid bruits, no edema  GI: abdomen soft, non-distended, non-tender, no hepatosplenomegaly  MS: gait w limp, no clubbing or cyanosis of the digits  SKIN: no rashes, warm to touch      Christophe was seen today for establish care.        Diagnoses and all orders for this visit:    Routine medical exam, flu shot and Pneumovax, patient will call [facility] to get colonoscopy set up  -     Comprehensive metabolic panel; Future  -     CBC auto differential; Future  -     TSH; Future  -     PSA, Screening; Future  -     Iron and TIBC; Future  -     Ferritin; Future  -     Lipid panel; Future    Screening for prostate cancer  -     PSA, Screening; Future    History of colonic polyps    Hypertension, unspecified type    Hyperlipidemia, unspecified hyperlipidemia type    Anemia, unspecified type    Sarcoidosis    Stage 4 chronic kidney disease    Benign prostatic hyperplasia, unspecified whether lower urinary tract symptoms present    SAMANTHA (obstructive sleep apnea)    Other orders  -     Pneumococcal Polysaccharide Vaccine (23 Valent) (SQ/IM)  -     Influenza - High Dose (65+) (PF) (IM)      note be sensitive so as to not cause any issues if there are any discrepancies in the available history",.,.,."This note will not be shared with the patient."  "

## 2019-09-11 ENCOUNTER — TELEPHONE (OUTPATIENT)
Dept: AUDIOLOGY | Facility: CLINIC | Age: 69
End: 2019-09-11

## 2019-09-11 ENCOUNTER — LAB VISIT (OUTPATIENT)
Dept: LAB | Facility: HOSPITAL | Age: 69
End: 2019-09-11
Attending: INTERNAL MEDICINE
Payer: COMMERCIAL

## 2019-09-11 DIAGNOSIS — Z00.00 ROUTINE MEDICAL EXAM: ICD-10-CM

## 2019-09-11 DIAGNOSIS — Z12.5 SCREENING FOR PROSTATE CANCER: ICD-10-CM

## 2019-09-11 LAB
ALBUMIN SERPL BCP-MCNC: 3.8 G/DL (ref 3.5–5.2)
ALP SERPL-CCNC: 48 U/L (ref 55–135)
ALT SERPL W/O P-5'-P-CCNC: 39 U/L (ref 10–44)
ANION GAP SERPL CALC-SCNC: 7 MMOL/L (ref 8–16)
AST SERPL-CCNC: 27 U/L (ref 10–40)
BASOPHILS # BLD AUTO: 0.02 K/UL (ref 0–0.2)
BASOPHILS NFR BLD: 0.3 % (ref 0–1.9)
BILIRUB SERPL-MCNC: 0.6 MG/DL (ref 0.1–1)
BUN SERPL-MCNC: 25 MG/DL (ref 8–23)
CALCIUM SERPL-MCNC: 9.2 MG/DL (ref 8.7–10.5)
CHLORIDE SERPL-SCNC: 105 MMOL/L (ref 95–110)
CHOLEST SERPL-MCNC: 171 MG/DL (ref 120–199)
CHOLEST/HDLC SERPL: 2.3 {RATIO} (ref 2–5)
CO2 SERPL-SCNC: 29 MMOL/L (ref 23–29)
COMPLEXED PSA SERPL-MCNC: 2.3 NG/ML (ref 0–4)
CREAT SERPL-MCNC: 2.1 MG/DL (ref 0.5–1.4)
DIFFERENTIAL METHOD: ABNORMAL
EOSINOPHIL # BLD AUTO: 0.2 K/UL (ref 0–0.5)
EOSINOPHIL NFR BLD: 3.3 % (ref 0–8)
ERYTHROCYTE [DISTWIDTH] IN BLOOD BY AUTOMATED COUNT: 13.4 % (ref 11.5–14.5)
EST. GFR  (AFRICAN AMERICAN): 37 ML/MIN/1.73 M^2
EST. GFR  (NON AFRICAN AMERICAN): 32 ML/MIN/1.73 M^2
FERRITIN SERPL-MCNC: 136 NG/ML (ref 20–300)
GLUCOSE SERPL-MCNC: 85 MG/DL (ref 70–110)
HCT VFR BLD AUTO: 43.9 % (ref 40–54)
HDLC SERPL-MCNC: 76 MG/DL (ref 40–75)
HDLC SERPL: 44.4 % (ref 20–50)
HGB BLD-MCNC: 14.3 G/DL (ref 14–18)
IRON SERPL-MCNC: 69 UG/DL (ref 45–160)
LDLC SERPL CALC-MCNC: 81.2 MG/DL (ref 63–159)
LYMPHOCYTES # BLD AUTO: 1.3 K/UL (ref 1–4.8)
LYMPHOCYTES NFR BLD: 18.4 % (ref 18–48)
MCH RBC QN AUTO: 32.4 PG (ref 27–31)
MCHC RBC AUTO-ENTMCNC: 32.6 G/DL (ref 32–36)
MCV RBC AUTO: 100 FL (ref 82–98)
MONOCYTES # BLD AUTO: 0.7 K/UL (ref 0.3–1)
MONOCYTES NFR BLD: 8.9 % (ref 4–15)
NEUTROPHILS # BLD AUTO: 5 K/UL (ref 1.8–7.7)
NEUTROPHILS NFR BLD: 69.5 % (ref 38–73)
NONHDLC SERPL-MCNC: 95 MG/DL
PLATELET # BLD AUTO: 185 K/UL (ref 150–350)
PMV BLD AUTO: 10.2 FL (ref 9.2–12.9)
POTASSIUM SERPL-SCNC: 4 MMOL/L (ref 3.5–5.1)
PROT SERPL-MCNC: 7.1 G/DL (ref 6–8.4)
RBC # BLD AUTO: 4.41 M/UL (ref 4.6–6.2)
SATURATED IRON: 23 % (ref 20–50)
SODIUM SERPL-SCNC: 141 MMOL/L (ref 136–145)
TOTAL IRON BINDING CAPACITY: 299 UG/DL (ref 250–450)
TRANSFERRIN SERPL-MCNC: 202 MG/DL (ref 200–375)
TRIGL SERPL-MCNC: 69 MG/DL (ref 30–150)
TSH SERPL DL<=0.005 MIU/L-ACNC: 1.19 UIU/ML (ref 0.4–4)
WBC # BLD AUTO: 7.27 K/UL (ref 3.9–12.7)

## 2019-09-11 PROCEDURE — 84443 ASSAY THYROID STIM HORMONE: CPT

## 2019-09-11 PROCEDURE — 85025 COMPLETE CBC W/AUTO DIFF WBC: CPT

## 2019-09-11 PROCEDURE — 82728 ASSAY OF FERRITIN: CPT

## 2019-09-11 PROCEDURE — 83540 ASSAY OF IRON: CPT

## 2019-09-11 PROCEDURE — 80061 LIPID PANEL: CPT

## 2019-09-11 PROCEDURE — 84153 ASSAY OF PSA TOTAL: CPT

## 2019-09-11 PROCEDURE — 80053 COMPREHEN METABOLIC PANEL: CPT

## 2019-09-11 PROCEDURE — 36415 COLL VENOUS BLD VENIPUNCTURE: CPT

## 2019-09-11 NOTE — TELEPHONE ENCOUNTER
----- Message from Renee Velazquez sent at 9/10/2019  1:48 PM CDT -----  Contact: Self / [phone]  Type: Patient Call Back    Who called:  Patient    What is the request in detail:  Patient would like staff to give him a call regarding his hearing aids    Would the patient rather a call back or a response via [facility]?   Call back    Best call back number:  [phone]

## 2019-09-18 ENCOUNTER — CLINICAL SUPPORT (OUTPATIENT)
Dept: AUDIOLOGY | Facility: CLINIC | Age: 69
End: 2019-09-18
Payer: COMMERCIAL

## 2019-09-18 DIAGNOSIS — H90.3 SENSORINEURAL HEARING LOSS, BILATERAL: Primary | ICD-10-CM

## 2019-09-18 PROCEDURE — 99499 NO LOS: ICD-10-PCS | Mod: S$GLB,,, | Performed by: AUDIOLOGIST

## 2019-09-18 PROCEDURE — 99499 UNLISTED E&M SERVICE: CPT | Mod: S$GLB,,, | Performed by: AUDIOLOGIST

## 2019-09-18 NOTE — PROGRESS NOTES
Christophe Renteria was seen today for a hearing aid check.  He reported his right hearing aid is weak.  Listening check confirmed good sound quality in the left aid and a weak right aid.  I cleaned and changed both wax guards and domes.  The listening check revealed good sound quality in both aids.  Patient is pleased with sound quality and will call with any further problems.

## 2020-01-07 ENCOUNTER — DOCUMENTATION ONLY (OUTPATIENT)
Dept: AUDIOLOGY | Facility: CLINIC | Age: 70
End: 2020-01-07

## 2020-01-07 ENCOUNTER — TELEPHONE (OUTPATIENT)
Dept: AUDIOLOGY | Facility: CLINIC | Age: 70
End: 2020-01-07

## 2020-01-07 NOTE — TELEPHONE ENCOUNTER
Spoke with patient and his right hearing aid is not charging.  He has 2 chargers and it does not charge in either .  He will drop of the hearing aid for me to take a look at.

## 2020-01-07 NOTE — TELEPHONE ENCOUNTER
----- Message from Vikki Galdamez sent at 1/6/2020 12:33 PM CST -----  Contact: Self  Type: Patient Call Back    Who called:Christophe    What is the request in detail:Patient requesting call back    Can the clinic reply by [facility]?    Would the patient rather a call back or a response via [facility]? Call    Best call back number:[phone]

## 2020-01-07 NOTE — PROGRESS NOTES
Patient dropped off his right hearing aid.  It is dead and will not charge.  I will send to [employer] for in warranty repair and notify patient when aid arrives.

## 2020-01-13 ENCOUNTER — TELEPHONE (OUTPATIENT)
Dept: AUDIOLOGY | Facility: CLINIC | Age: 70
End: 2020-01-13

## 2020-01-13 NOTE — TELEPHONE ENCOUNTER
----- Message from Cara Pena sent at 1/13/2020  1:52 PM CST -----  Type:  Patient Returning Call    Who Called: pt     Who Left Message for Patient:  Marylin    Does the patient know what this is regarding?:    Would the patient rather a call back or a response via [facility]? Call back     Best Call Back Number: [phone]      Additional Information:

## 2020-02-07 DIAGNOSIS — Z11.59 NEED FOR HEPATITIS C SCREENING TEST: ICD-10-CM

## 2020-02-17 DIAGNOSIS — E78.5 HYPERLIPIDEMIA, UNSPECIFIED HYPERLIPIDEMIA TYPE: ICD-10-CM

## 2020-02-17 DIAGNOSIS — N40.0 BENIGN PROSTATIC HYPERPLASIA, UNSPECIFIED WHETHER LOWER URINARY TRACT SYMPTOMS PRESENT: Primary | ICD-10-CM

## 2020-02-17 DIAGNOSIS — I10 HYPERTENSION, UNSPECIFIED TYPE: ICD-10-CM

## 2020-02-17 RX ORDER — TAMSULOSIN HYDROCHLORIDE 0.4 MG/1
0.4 CAPSULE ORAL DAILY
Qty: 90 CAPSULE | Refills: 3 | Status: SHIPPED | OUTPATIENT
Start: 2020-02-17 | End: 2021-02-12

## 2020-02-17 RX ORDER — PRAVASTATIN SODIUM 40 MG/1
40 TABLET ORAL DAILY
Qty: 90 TABLET | Refills: 3 | Status: SHIPPED | OUTPATIENT
Start: 2020-02-17 | End: 2021-02-12

## 2020-02-17 RX ORDER — AMLODIPINE BESYLATE 10 MG/1
10 TABLET ORAL DAILY
Qty: 90 TABLET | Refills: 3 | Status: SHIPPED | OUTPATIENT
Start: 2020-02-17 | End: 2021-02-12

## 2020-02-17 NOTE — TELEPHONE ENCOUNTER
----- Message from Lorena John sent at 2/17/2020  9:16 AM CST -----  Contact: pt  Type: RX Refill Request    Who Called: pt    Have you contacted your pharmacy:    Refill or New Rx:tamsulosin (FLOMAX) 0.4 mg Cp24     amlodipine (NORVASC) 10 MG tablet     pravastatin (PRAVACHOL) 40 MG tablet     RX Name and Strength:    How is the patient currently taking it? (ex. 1XDay):    Is this a 30 day or 90 day RX:    Preferred Pharmacy with phone number:  [facility]/pharmacy #[id number]  [place] - [address]  [address] [place] [address]  Phone: [phone] Fax: [phone]          Local or Mail Order:    Ordering Provider:    Would the patient rather a call back or a response via [facility]? call    Best Call Back Number:[phone]    Additional Information:

## 2020-06-08 DIAGNOSIS — I10 ESSENTIAL HYPERTENSION: Primary | ICD-10-CM

## 2020-06-11 ENCOUNTER — TELEPHONE (OUTPATIENT)
Dept: FAMILY MEDICINE | Facility: CLINIC | Age: 70
End: 2020-06-11

## 2020-06-12 ENCOUNTER — LAB VISIT (OUTPATIENT)
Dept: LAB | Facility: HOSPITAL | Age: 70
End: 2020-06-12
Attending: ORTHOPAEDIC SURGERY
Payer: COMMERCIAL

## 2020-06-12 DIAGNOSIS — I10 ESSENTIAL HYPERTENSION: ICD-10-CM

## 2020-06-12 LAB — SARS-COV-2 IGG SERPLBLD QL IA.RAPID: NEGATIVE

## 2020-06-12 PROCEDURE — 86769 SARS-COV-2 COVID-19 ANTIBODY: CPT

## 2020-06-12 PROCEDURE — 36415 COLL VENOUS BLD VENIPUNCTURE: CPT | Mod: PO

## 2020-08-18 ENCOUNTER — DOCUMENTATION ONLY (OUTPATIENT)
Dept: AUDIOLOGY | Facility: CLINIC | Age: 70
End: 2020-08-18

## 2020-08-18 NOTE — PROGRESS NOTES
Christophe Renteria dropped off his right hearing aid stating that is was dead and would not charge.  I will send the hearing aid to [facility] for an in warranty repair and contact him when the hearing aid is back from repair.

## 2020-09-18 DIAGNOSIS — I10 HTN (HYPERTENSION): ICD-10-CM

## 2020-10-09 DIAGNOSIS — I10 HTN (HYPERTENSION): ICD-10-CM

## 2020-10-23 DIAGNOSIS — I10 HTN (HYPERTENSION): ICD-10-CM

## 2020-12-04 DIAGNOSIS — I10 HTN (HYPERTENSION): ICD-10-CM

## 2021-01-04 ENCOUNTER — TELEPHONE (OUTPATIENT)
Dept: AUDIOLOGY | Facility: CLINIC | Age: 71
End: 2021-01-04

## 2021-01-04 ENCOUNTER — DOCUMENTATION ONLY (OUTPATIENT)
Dept: AUDIOLOGY | Facility: CLINIC | Age: 71
End: 2021-01-04

## 2021-01-22 ENCOUNTER — TELEPHONE (OUTPATIENT)
Dept: AUDIOLOGY | Facility: CLINIC | Age: 71
End: 2021-01-22

## 2021-02-15 ENCOUNTER — PATIENT MESSAGE (OUTPATIENT)
Dept: FAMILY MEDICINE | Facility: CLINIC | Age: 71
End: 2021-02-15

## 2021-02-17 ENCOUNTER — TELEPHONE (OUTPATIENT)
Dept: AUDIOLOGY | Facility: CLINIC | Age: 71
End: 2021-02-17

## 2021-04-12 ENCOUNTER — PATIENT MESSAGE (OUTPATIENT)
Dept: ADMINISTRATIVE | Facility: HOSPITAL | Age: 71
End: 2021-04-12

## 2021-05-11 ENCOUNTER — TELEPHONE (OUTPATIENT)
Dept: OTOLARYNGOLOGY | Facility: CLINIC | Age: 71
End: 2021-05-11

## 2021-05-12 ENCOUNTER — TELEPHONE (OUTPATIENT)
Dept: OTOLARYNGOLOGY | Facility: CLINIC | Age: 71
End: 2021-05-12

## 2021-05-26 ENCOUNTER — TELEPHONE (OUTPATIENT)
Dept: OTOLARYNGOLOGY | Facility: CLINIC | Age: 71
End: 2021-05-26

## 2021-07-16 ENCOUNTER — TELEPHONE (OUTPATIENT)
Dept: OTOLARYNGOLOGY | Facility: CLINIC | Age: 71
End: 2021-07-16

## 2021-08-25 ENCOUNTER — IMMUNIZATION (OUTPATIENT)
Dept: OBSTETRICS AND GYNECOLOGY | Facility: CLINIC | Age: 71
End: 2021-08-25
Payer: COMMERCIAL

## 2021-08-25 DIAGNOSIS — Z23 NEED FOR VACCINATION: Primary | ICD-10-CM

## 2021-08-25 PROCEDURE — 91300 COVID-19, MRNA, LNP-S, PF, 30 MCG/0.3 ML DOSE VACCINE: ICD-10-PCS | Mod: ,,, | Performed by: FAMILY MEDICINE

## 2021-08-25 PROCEDURE — 91300 COVID-19, MRNA, LNP-S, PF, 30 MCG/0.3 ML DOSE VACCINE: CPT | Mod: ,,, | Performed by: FAMILY MEDICINE

## 2021-08-25 PROCEDURE — 0003A COVID-19, MRNA, LNP-S, PF, 30 MCG/0.3 ML DOSE VACCINE: ICD-10-PCS | Mod: CV19,,, | Performed by: FAMILY MEDICINE

## 2021-08-25 PROCEDURE — 0003A COVID-19, MRNA, LNP-S, PF, 30 MCG/0.3 ML DOSE VACCINE: CPT | Mod: CV19,,, | Performed by: FAMILY MEDICINE

## 2021-09-21 ENCOUNTER — TELEPHONE (OUTPATIENT)
Dept: FAMILY MEDICINE | Facility: CLINIC | Age: 71
End: 2021-09-21

## 2021-10-04 ENCOUNTER — TELEPHONE (OUTPATIENT)
Dept: SLEEP MEDICINE | Facility: CLINIC | Age: 71
End: 2021-10-04

## 2021-10-26 ENCOUNTER — OFFICE VISIT (OUTPATIENT)
Dept: SLEEP MEDICINE | Facility: CLINIC | Age: 71
End: 2021-10-26
Payer: COMMERCIAL

## 2021-10-26 DIAGNOSIS — L40.50 PSORIATIC ARTHRITIS: ICD-10-CM

## 2021-10-26 DIAGNOSIS — G47.33 OSA (OBSTRUCTIVE SLEEP APNEA): Primary | ICD-10-CM

## 2021-10-26 DIAGNOSIS — I10 PRIMARY HYPERTENSION: ICD-10-CM

## 2021-10-26 DIAGNOSIS — E78.5 HYPERLIPIDEMIA, UNSPECIFIED HYPERLIPIDEMIA TYPE: ICD-10-CM

## 2021-10-26 PROCEDURE — 1160F RVW MEDS BY RX/DR IN RCRD: CPT | Mod: CPTII,S$GLB,, | Performed by: INTERNAL MEDICINE

## 2021-10-26 PROCEDURE — 99202 OFFICE O/P NEW SF 15 MIN: CPT | Mod: S$GLB,,, | Performed by: INTERNAL MEDICINE

## 2021-10-26 PROCEDURE — 1159F PR MEDICATION LIST DOCUMENTED IN MEDICAL RECORD: ICD-10-PCS | Mod: CPTII,S$GLB,, | Performed by: INTERNAL MEDICINE

## 2021-10-26 PROCEDURE — 99999 PR PBB SHADOW E&M-EST. PATIENT-LVL I: CPT | Mod: PBBFAC,,, | Performed by: INTERNAL MEDICINE

## 2021-10-26 PROCEDURE — 99202 PR OFFICE/OUTPT VISIT, NEW, LEVL II, 15-29 MIN: ICD-10-PCS | Mod: S$GLB,,, | Performed by: INTERNAL MEDICINE

## 2021-10-26 PROCEDURE — 1159F MED LIST DOCD IN RCRD: CPT | Mod: CPTII,S$GLB,, | Performed by: INTERNAL MEDICINE

## 2021-10-26 PROCEDURE — 99999 PR PBB SHADOW E&M-EST. PATIENT-LVL I: ICD-10-PCS | Mod: PBBFAC,,, | Performed by: INTERNAL MEDICINE

## 2021-10-26 PROCEDURE — 1160F PR REVIEW ALL MEDS BY PRESCRIBER/CLIN PHARMACIST DOCUMENTED: ICD-10-PCS | Mod: CPTII,S$GLB,, | Performed by: INTERNAL MEDICINE

## 2021-12-01 ENCOUNTER — TELEPHONE (OUTPATIENT)
Dept: OTOLARYNGOLOGY | Facility: CLINIC | Age: 71
End: 2021-12-01
Payer: COMMERCIAL

## 2021-12-06 ENCOUNTER — DOCUMENTATION ONLY (OUTPATIENT)
Dept: OTOLARYNGOLOGY | Facility: CLINIC | Age: 71
End: 2021-12-06
Payer: COMMERCIAL

## 2021-12-10 ENCOUNTER — DOCUMENTATION ONLY (OUTPATIENT)
Dept: OTOLARYNGOLOGY | Facility: CLINIC | Age: 71
End: 2021-12-10
Payer: COMMERCIAL

## 2021-12-10 ENCOUNTER — PATIENT MESSAGE (OUTPATIENT)
Dept: OTOLARYNGOLOGY | Facility: CLINIC | Age: 71
End: 2021-12-10
Payer: COMMERCIAL

## 2021-12-14 ENCOUNTER — CLINICAL SUPPORT (OUTPATIENT)
Dept: OTOLARYNGOLOGY | Facility: CLINIC | Age: 71
End: 2021-12-14
Payer: COMMERCIAL

## 2021-12-14 DIAGNOSIS — H90.3 SENSORINEURAL HEARING LOSS, BILATERAL: Primary | ICD-10-CM

## 2021-12-14 PROCEDURE — 99499 NO LOS: ICD-10-PCS | Mod: S$GLB,,, | Performed by: AUDIOLOGIST

## 2021-12-14 PROCEDURE — 99499 UNLISTED E&M SERVICE: CPT | Mod: S$GLB,,, | Performed by: AUDIOLOGIST

## 2022-05-17 ENCOUNTER — IMMUNIZATION (OUTPATIENT)
Dept: OBSTETRICS AND GYNECOLOGY | Facility: CLINIC | Age: 72
End: 2022-05-17
Payer: COMMERCIAL

## 2022-05-17 DIAGNOSIS — Z23 NEED FOR VACCINATION: Primary | ICD-10-CM

## 2022-05-17 PROCEDURE — 0051A COVID-19, MRNA, LNP-S, PF, 30 MCG/0.3 ML DOSE VACCINE (PFIZER): CPT | Mod: PBBFAC | Performed by: FAMILY MEDICINE

## 2022-05-17 PROCEDURE — 91305 COVID-19, MRNA, LNP-S, PF, 30 MCG/0.3 ML DOSE VACCINE (PFIZER): CPT | Mod: PBBFAC | Performed by: FAMILY MEDICINE

## 2022-05-31 ENCOUNTER — PATIENT MESSAGE (OUTPATIENT)
Dept: ADMINISTRATIVE | Facility: HOSPITAL | Age: 72
End: 2022-05-31
Payer: COMMERCIAL

## 2022-08-13 DIAGNOSIS — E78.5 HYPERLIPIDEMIA, UNSPECIFIED HYPERLIPIDEMIA TYPE: ICD-10-CM

## 2022-08-13 NOTE — TELEPHONE ENCOUNTER
Care Due:                  Date            Visit Type   Department     Provider  --------------------------------------------------------------------------------    Last Visit: None Found      None         None Found  Next Visit: None Scheduled  None         None Found                                                            Last  Test          Frequency    Reason                     Performed    Due Date  --------------------------------------------------------------------------------    Office Visit  12 months..  pravastatin..............  Not Found    Overdue    CMP.........  12 months..  pravastatin..............  Not Found    Overdue    Lipid Panel.  12 months..  pravastatin..............  Not Found    Overdue    [employer] Embedded Care Gaps. Reference number: [id number]. 8/13/2022   8:38:12 AM CDT

## 2022-08-16 RX ORDER — PRAVASTATIN SODIUM 40 MG/1
TABLET ORAL
Qty: 90 TABLET | Refills: 0 | Status: SHIPPED | OUTPATIENT
Start: 2022-08-16 | End: 2022-09-02 | Stop reason: SDUPTHER

## 2022-09-02 ENCOUNTER — LAB VISIT (OUTPATIENT)
Dept: LAB | Facility: HOSPITAL | Age: 72
End: 2022-09-02
Attending: INTERNAL MEDICINE
Payer: COMMERCIAL

## 2022-09-02 ENCOUNTER — OFFICE VISIT (OUTPATIENT)
Dept: FAMILY MEDICINE | Facility: CLINIC | Age: 72
End: 2022-09-02
Payer: COMMERCIAL

## 2022-09-02 VITALS
BODY MASS INDEX: 30.33 KG/M2 | OXYGEN SATURATION: 97 % | SYSTOLIC BLOOD PRESSURE: 130 MMHG | HEIGHT: 66 IN | HEART RATE: 74 BPM | TEMPERATURE: 98 F | DIASTOLIC BLOOD PRESSURE: 86 MMHG | WEIGHT: 188.69 LBS

## 2022-09-02 DIAGNOSIS — Z12.11 SCREENING FOR COLORECTAL CANCER: ICD-10-CM

## 2022-09-02 DIAGNOSIS — Z00.00 ROUTINE MEDICAL EXAM: ICD-10-CM

## 2022-09-02 DIAGNOSIS — N18.30 STAGE 3 CHRONIC KIDNEY DISEASE, UNSPECIFIED WHETHER STAGE 3A OR 3B CKD: ICD-10-CM

## 2022-09-02 DIAGNOSIS — N40.0 BENIGN PROSTATIC HYPERPLASIA, UNSPECIFIED WHETHER LOWER URINARY TRACT SYMPTOMS PRESENT: ICD-10-CM

## 2022-09-02 DIAGNOSIS — D86.9 SARCOIDOSIS: ICD-10-CM

## 2022-09-02 DIAGNOSIS — E78.5 HYPERLIPIDEMIA, UNSPECIFIED HYPERLIPIDEMIA TYPE: ICD-10-CM

## 2022-09-02 DIAGNOSIS — Z00.00 ROUTINE MEDICAL EXAM: Primary | ICD-10-CM

## 2022-09-02 DIAGNOSIS — I10 HYPERTENSION, UNSPECIFIED TYPE: ICD-10-CM

## 2022-09-02 DIAGNOSIS — N52.9 ERECTILE DYSFUNCTION, UNSPECIFIED ERECTILE DYSFUNCTION TYPE: ICD-10-CM

## 2022-09-02 DIAGNOSIS — Z12.12 SCREENING FOR COLORECTAL CANCER: ICD-10-CM

## 2022-09-02 DIAGNOSIS — D64.9 ANEMIA, UNSPECIFIED TYPE: ICD-10-CM

## 2022-09-02 DIAGNOSIS — L40.50 PSORIATIC ARTHRITIS: ICD-10-CM

## 2022-09-02 DIAGNOSIS — G47.33 OSA (OBSTRUCTIVE SLEEP APNEA): ICD-10-CM

## 2022-09-02 DIAGNOSIS — Z12.5 SCREENING FOR PROSTATE CANCER: ICD-10-CM

## 2022-09-02 DIAGNOSIS — I10 PRIMARY HYPERTENSION: ICD-10-CM

## 2022-09-02 DIAGNOSIS — Z86.010 HISTORY OF COLONIC POLYPS: ICD-10-CM

## 2022-09-02 LAB
ALBUMIN SERPL BCP-MCNC: 4 G/DL (ref 3.5–5.2)
ALP SERPL-CCNC: 47 U/L (ref 55–135)
ALT SERPL W/O P-5'-P-CCNC: 27 U/L (ref 10–44)
ANION GAP SERPL CALC-SCNC: 16 MMOL/L (ref 8–16)
AST SERPL-CCNC: 23 U/L (ref 10–40)
BASOPHILS # BLD AUTO: 0.05 K/UL (ref 0–0.2)
BASOPHILS NFR BLD: 0.7 % (ref 0–1.9)
BILIRUB SERPL-MCNC: 0.5 MG/DL (ref 0.1–1)
BUN SERPL-MCNC: 29 MG/DL (ref 8–23)
CALCIUM SERPL-MCNC: 9.6 MG/DL (ref 8.7–10.5)
CHLORIDE SERPL-SCNC: 101 MMOL/L (ref 95–110)
CHOLEST SERPL-MCNC: 192 MG/DL (ref 120–199)
CHOLEST/HDLC SERPL: 2.5 {RATIO} (ref 2–5)
CO2 SERPL-SCNC: 23 MMOL/L (ref 23–29)
COMPLEXED PSA SERPL-MCNC: 3 NG/ML (ref 0–4)
CREAT SERPL-MCNC: 2.2 MG/DL (ref 0.5–1.4)
DIFFERENTIAL METHOD: ABNORMAL
EOSINOPHIL # BLD AUTO: 0.3 K/UL (ref 0–0.5)
EOSINOPHIL NFR BLD: 4.5 % (ref 0–8)
ERYTHROCYTE [DISTWIDTH] IN BLOOD BY AUTOMATED COUNT: 13.2 % (ref 11.5–14.5)
EST. GFR  (NO RACE VARIABLE): 31.4 ML/MIN/1.73 M^2
ESTIMATED AVG GLUCOSE: 100 MG/DL (ref 68–131)
GLUCOSE SERPL-MCNC: 93 MG/DL (ref 70–110)
HBA1C MFR BLD: 5.1 % (ref 4–5.6)
HCT VFR BLD AUTO: 45.6 % (ref 40–54)
HDLC SERPL-MCNC: 77 MG/DL (ref 40–75)
HDLC SERPL: 40.1 % (ref 20–50)
HGB BLD-MCNC: 15.4 G/DL (ref 14–18)
IMM GRANULOCYTES # BLD AUTO: 0.01 K/UL (ref 0–0.04)
IMM GRANULOCYTES NFR BLD AUTO: 0.1 % (ref 0–0.5)
LDLC SERPL CALC-MCNC: 101.4 MG/DL (ref 63–159)
LYMPHOCYTES # BLD AUTO: 1.8 K/UL (ref 1–4.8)
LYMPHOCYTES NFR BLD: 25.7 % (ref 18–48)
MCH RBC QN AUTO: 34.1 PG (ref 27–31)
MCHC RBC AUTO-ENTMCNC: 33.8 G/DL (ref 32–36)
MCV RBC AUTO: 101 FL (ref 82–98)
MONOCYTES # BLD AUTO: 0.8 K/UL (ref 0.3–1)
MONOCYTES NFR BLD: 10.5 % (ref 4–15)
NEUTROPHILS # BLD AUTO: 4.2 K/UL (ref 1.8–7.7)
NEUTROPHILS NFR BLD: 58.5 % (ref 38–73)
NONHDLC SERPL-MCNC: 115 MG/DL
NRBC BLD-RTO: 0 /100 WBC
PLATELET # BLD AUTO: 210 K/UL (ref 150–450)
PMV BLD AUTO: 10.9 FL (ref 9.2–12.9)
POTASSIUM SERPL-SCNC: 3.8 MMOL/L (ref 3.5–5.1)
PROT SERPL-MCNC: 7.4 G/DL (ref 6–8.4)
RBC # BLD AUTO: 4.52 M/UL (ref 4.6–6.2)
SODIUM SERPL-SCNC: 140 MMOL/L (ref 136–145)
TRIGL SERPL-MCNC: 68 MG/DL (ref 30–150)
TSH SERPL DL<=0.005 MIU/L-ACNC: 1.19 UIU/ML (ref 0.4–4)
WBC # BLD AUTO: 7.13 K/UL (ref 3.9–12.7)

## 2022-09-02 PROCEDURE — 80061 LIPID PANEL: CPT | Performed by: INTERNAL MEDICINE

## 2022-09-02 PROCEDURE — 3288F PR FALLS RISK ASSESSMENT DOCUMENTED: ICD-10-PCS | Mod: CPTII,S$GLB,, | Performed by: INTERNAL MEDICINE

## 2022-09-02 PROCEDURE — 80053 COMPREHEN METABOLIC PANEL: CPT | Performed by: INTERNAL MEDICINE

## 2022-09-02 PROCEDURE — 1101F PT FALLS ASSESS-DOCD LE1/YR: CPT | Mod: CPTII,S$GLB,, | Performed by: INTERNAL MEDICINE

## 2022-09-02 PROCEDURE — 3008F BODY MASS INDEX DOCD: CPT | Mod: CPTII,S$GLB,, | Performed by: INTERNAL MEDICINE

## 2022-09-02 PROCEDURE — 3079F DIAST BP 80-89 MM HG: CPT | Mod: CPTII,S$GLB,, | Performed by: INTERNAL MEDICINE

## 2022-09-02 PROCEDURE — 3075F PR MOST RECENT SYSTOLIC BLOOD PRESS GE 130-139MM HG: ICD-10-PCS | Mod: CPTII,S$GLB,, | Performed by: INTERNAL MEDICINE

## 2022-09-02 PROCEDURE — 3008F PR BODY MASS INDEX (BMI) DOCUMENTED: ICD-10-PCS | Mod: CPTII,S$GLB,, | Performed by: INTERNAL MEDICINE

## 2022-09-02 PROCEDURE — 1159F PR MEDICATION LIST DOCUMENTED IN MEDICAL RECORD: ICD-10-PCS | Mod: CPTII,S$GLB,, | Performed by: INTERNAL MEDICINE

## 2022-09-02 PROCEDURE — 84443 ASSAY THYROID STIM HORMONE: CPT | Performed by: INTERNAL MEDICINE

## 2022-09-02 PROCEDURE — 85025 COMPLETE CBC W/AUTO DIFF WBC: CPT | Performed by: INTERNAL MEDICINE

## 2022-09-02 PROCEDURE — 83036 HEMOGLOBIN GLYCOSYLATED A1C: CPT | Performed by: INTERNAL MEDICINE

## 2022-09-02 PROCEDURE — 99397 PR PREVENTIVE VISIT,EST,65 & OVER: ICD-10-PCS | Mod: S$GLB,,, | Performed by: INTERNAL MEDICINE

## 2022-09-02 PROCEDURE — 3075F SYST BP GE 130 - 139MM HG: CPT | Mod: CPTII,S$GLB,, | Performed by: INTERNAL MEDICINE

## 2022-09-02 PROCEDURE — 3079F PR MOST RECENT DIASTOLIC BLOOD PRESSURE 80-89 MM HG: ICD-10-PCS | Mod: CPTII,S$GLB,, | Performed by: INTERNAL MEDICINE

## 2022-09-02 PROCEDURE — 99999 PR PBB SHADOW E&M-EST. PATIENT-LVL III: ICD-10-PCS | Mod: PBBFAC,,, | Performed by: INTERNAL MEDICINE

## 2022-09-02 PROCEDURE — 99999 PR PBB SHADOW E&M-EST. PATIENT-LVL III: CPT | Mod: PBBFAC,,, | Performed by: INTERNAL MEDICINE

## 2022-09-02 PROCEDURE — 3288F FALL RISK ASSESSMENT DOCD: CPT | Mod: CPTII,S$GLB,, | Performed by: INTERNAL MEDICINE

## 2022-09-02 PROCEDURE — 1159F MED LIST DOCD IN RCRD: CPT | Mod: CPTII,S$GLB,, | Performed by: INTERNAL MEDICINE

## 2022-09-02 PROCEDURE — 99397 PER PM REEVAL EST PAT 65+ YR: CPT | Mod: S$GLB,,, | Performed by: INTERNAL MEDICINE

## 2022-09-02 PROCEDURE — 1101F PR PT FALLS ASSESS DOC 0-1 FALLS W/OUT INJ PAST YR: ICD-10-PCS | Mod: CPTII,S$GLB,, | Performed by: INTERNAL MEDICINE

## 2022-09-02 PROCEDURE — 84153 ASSAY OF PSA TOTAL: CPT | Performed by: INTERNAL MEDICINE

## 2022-09-02 PROCEDURE — 36415 COLL VENOUS BLD VENIPUNCTURE: CPT | Mod: PO | Performed by: INTERNAL MEDICINE

## 2022-09-02 RX ORDER — AMLODIPINE BESYLATE 10 MG/1
10 TABLET ORAL DAILY
Qty: 90 TABLET | Refills: 3 | Status: SHIPPED | OUTPATIENT
Start: 2022-09-02 | End: 2023-08-21 | Stop reason: SDUPTHER

## 2022-09-02 RX ORDER — PRAVASTATIN SODIUM 40 MG/1
40 TABLET ORAL DAILY
Qty: 90 TABLET | Refills: 4 | Status: SHIPPED | OUTPATIENT
Start: 2022-09-02 | End: 2023-08-21 | Stop reason: SDUPTHER

## 2022-09-02 NOTE — PROGRESS NOTES
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  His chart was reviewed and problem list and history updated by myself. He is followed by urology for BPH.  Labs reviewed and looks like he has had some kidney disease which by biopsy years ago was felt to be obstructive but then had another biopsy at [place] which showed renal sarcoid.  Apparently no sarcoid anywhere else.  Treated with high-dose steroids and now on Plaquenil.  Followed by Dr. Lakhani  He had some anemia associated with the admissions for his surgeries/joint replacements.  He was told by Nephrology that they only need to see him once a year.  He has not yet had his colonoscopy with any wrote himself a reminder to call [facility] [place], operated by [facility] to get that set up.  Referral put in.      Open to getting labs     Blood pressure occasionally 150 at the office, 160 today but sometimes runs more normal.  He is only on amlodipine.  He might have been on Lotrel in the past.  He does follow with an outside nephrologist and will have him check blood pressure at home and also touch base with Nephrology to assess if indeed there is any particular reason he was taken off of the ACE-inhibitor such as hyperkalemia or rise in creatinine.  May well institute ARB with lab monitoring thereafter.      Now working part time.        ROS:   CONST: weight stable. EYES: no vision change. ENT: no sore throat. CV: no chest pain w/ exertion. RESP: no shortness of breath. GI: no nausea, vomiting, diarrhea. No dysphagia. : no urinary issues. MUSCULOSKELETAL: no new myalgias or arthralgias. SKIN: no new changes. NEURO: no focal deficits. PSYCH: no new issues. ENDOCRINE: no polyuria. HEME: no lymph nodes. ALLERGY: no general pruritis.    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 Lakhani   Left knee replaced  Colon polyps, Dr. Alberto, due     Past Surgical History:   Procedure Laterality Date    ANKLE SURGERY Right 2012    right subtalar arthrodesis -Treuting    BACK SURGERY      FRACTURE SURGERY      JOINT REPLACEMENT      Rt total hip    KNEE SURGERY  2017    TKA    ROTATOR CUFF REPAIR Right      Social History     Socioeconomic History    Marital status:    Occupational History     Employer: / [employer]   Tobacco Use    Smoking status: Former     Types: Pipe     Quit date:      Years since quittin.6    Smokeless tobacco: Never   Substance and Sexual Activity    Alcohol use: Yes     Alcohol/week: 0.0 standard drinks     Comment: occassional    Drug use: No     Social Determinants of Health     Financial Resource Strain: Low Risk     Difficulty of Paying Living Expenses: Not hard at all   Food Insecurity: No Food Insecurity    Worried About Running Out of Food in the Last Year: Never true    Ran Out of Food in the Last Year: Never true   Transportation Needs: No Transportation Needs    Lack of Transportation (Medical): No    Lack of Transportation (Non-Medical): No   Physical Activity: Sufficiently Active    Days of Exercise per Week: 3 days    Minutes of Exercise per Session: 90 min   Stress: No Stress Concern Present    Feeling of Stress : Not at all   Social Connections: Unknown    Frequency of Communication with Friends and Family: More than three times a week    Frequency of Social Gatherings with Friends and Family: Twice a week    Active Member of Clubs or Organizations: Yes    Attends  Club or Organization Meetings: More than 4 times per year    Marital Status:    Housing Stability: Low Risk     Unable to Pay for Housing in the Last Year: No    Number of Places Lived in the Last Year: 1    Unstable Housing in the Last Year: No     family history includes Cancer in his mother; Hypertension in his father; Stroke in his father.      Gen: no distress  EYES: conjunctiva clear, non-icteric, PERRL  ENT: nose clear, nasal mucosa normal, oropharynx clear and moist, teeth good  NECK:supple, thyroid non-palpable  RESP: effort is good, lungs clear  CV: heart RRR w/o murmur, gallops or rubs; no carotid bruits, no edema  GI: abdomen soft, non-distended, non-tender, no hepatosplenomegaly  MS: gait w limp, no clubbing or cyanosis of the digits  SKIN: no rashes, warm to touch      Christophe was seen today for annual exam.    Diagnoses and all orders for this visit:    Routine medical exam, overdue for follow-up on colon polyps and he would prefer to have it done with GI that he knows well.  Referral put in in case he needs that.  Need to update all labs and PSA.  -     Hemoglobin A1C; Future  -     Comprehensive Metabolic Panel; Future  -     TSH; Future  -     Lipid Panel; Future  -     CBC Auto Differential; Future  -     Prostate Specific Antigen, Diagnostic; Future    Screening for prostate cancer    Screening for colorectal cancer  -     Ambulatory referral/consult to Gastroenterology; Future    Stage 3 chronic kidney disease, unspecified whether stage 3a or 3b CKD, reassess, follows with Nephrology  -     Hemoglobin A1C; Future  -     Comprehensive Metabolic Panel; Future  -     TSH; Future  -     Lipid Panel; Future  -     CBC Auto Differential; Future  -     Prostate Specific Antigen, Diagnostic; Future    Sarcoidosis    Anemia, unspecified type  -     CBC Auto Differential; Future    Benign prostatic hyperplasia, unspecified whether lower urinary tract symptoms present  -     Prostate Specific  Antigen, Diagnostic; Future    Erectile dysfunction, unspecified erectile dysfunction type    Primary hypertension, monitor at home to assess repeatedly to reduce stroke risk, protect and preserve kidney function .    Hyperlipidemia, unspecified hyperlipidemia type  -     Lipid Panel; Future    Psoriatic arthritis    SAMANTHA (obstructive sleep apnea)    History of colonic polyps  -     Ambulatory referral/consult to Gastroenterology; Future

## 2022-12-22 LAB — CRC RECOMMENDATION EXT: NORMAL

## 2022-12-23 ENCOUNTER — PATIENT OUTREACH (OUTPATIENT)
Dept: ADMINISTRATIVE | Facility: HOSPITAL | Age: 72
End: 2022-12-23
Payer: COMMERCIAL

## 2023-03-09 ENCOUNTER — IMMUNIZATION (OUTPATIENT)
Dept: OBSTETRICS AND GYNECOLOGY | Facility: CLINIC | Age: 73
End: 2023-03-09
Payer: COMMERCIAL

## 2023-03-09 DIAGNOSIS — Z23 NEED FOR VACCINATION: Primary | ICD-10-CM

## 2023-03-09 PROCEDURE — 91312 COVID-19, MRNA, LNP-S, BIVALENT BOOSTER, PF, 30 MCG/0.3 ML DOSE: CPT | Mod: S$GLB,,, | Performed by: FAMILY MEDICINE

## 2023-03-09 PROCEDURE — 0124A COVID-19, MRNA, LNP-S, BIVALENT BOOSTER, PF, 30 MCG/0.3 ML DOSE: CPT | Mod: PBBFAC | Performed by: FAMILY MEDICINE

## 2023-03-09 PROCEDURE — 91312 COVID-19, MRNA, LNP-S, BIVALENT BOOSTER, PF, 30 MCG/0.3 ML DOSE: ICD-10-PCS | Mod: S$GLB,,, | Performed by: FAMILY MEDICINE

## 2023-06-09 ENCOUNTER — TELEPHONE (OUTPATIENT)
Dept: OTOLARYNGOLOGY | Facility: CLINIC | Age: 73
End: 2023-06-09
Payer: MEDICARE

## 2023-06-09 NOTE — TELEPHONE ENCOUNTER
----- Message from Uriel Willis sent at 6/9/2023  9:11 AM CDT -----  Contact: pt  Type: Requesting to speak with nurse        Who Called: PT  Regarding: problem with hearing aids   Would the patient rather a call back or a response via [facility]? Call back  Best Call Back Number: [phone]  Additional Information:

## 2023-06-09 NOTE — TELEPHONE ENCOUNTER
Returned patient's phone call.  He reported that his right hearing aid is not charging.  He asked if he can bring it in for repair and should he bring to me or is there someone at the [facility] office now that he could see.  I let him know the name of the Audiologist at [facility] and told him he could either see me at [place] or go to the [facility] office.  He will contact [facility] and schedule there as it is more convenient for him.

## 2023-06-09 NOTE — TELEPHONE ENCOUNTER
----- Message from Uriel Willis sent at 6/9/2023  9:11 AM CDT -----  Contact: pt  Type: Requesting to speak with nurse        Who Called: PT  Regarding: problem with hearing aids   Would the patient rather a call back or a response via [facility]? Call back  Best Call Back Number: [phone]  Additional Information:

## 2023-06-19 ENCOUNTER — CLINICAL SUPPORT (OUTPATIENT)
Dept: AUDIOLOGY | Facility: CLINIC | Age: 73
End: 2023-06-19
Payer: MEDICARE

## 2023-06-19 DIAGNOSIS — H90.3 SENSORINEURAL HEARING LOSS (SNHL) OF BOTH EARS: Primary | ICD-10-CM

## 2023-06-19 PROCEDURE — 99499 UNLISTED E&M SERVICE: CPT | Mod: S$GLB,,,

## 2023-06-19 PROCEDURE — 99499 NO LOS: ICD-10-PCS | Mod: S$GLB,,,

## 2023-06-19 NOTE — PROGRESS NOTES
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 Dr. Renteria reported that his right hearing aid has not been charging properly over the past 3 weeks. Right hearing aid light stays red even after charging overnight and charge does not last throughout the day. Patient reported that he has tried charging hearing aids on both of his back-up chargers and right hearing aid has not improved. Dr. Renteria currently wears binaural Phonak Audeo M90-R hearing aids dispensed at [facility] on 6/6/2019. Both hearing aids are out of warranty (exp: 9/1/22). RSN: [id number], LSN: [id number].    Right wax filter was completely clogged upon inspection. Replaced wax filters and large open domes. Listening check indicated good sound quality. Called Phonak support and they recommended using a different power supply with the hearing aid . Placed order for recommended power supply with [employer] support and informed patient that the right hearing aid will need to be sent in for repair if it still does not charge properly. Provided extra domes, wax filters, and cleaning brush. Will call patient to  power supply when it is delivered to the clinic.    Recommendations:  1.) Continue daily use of binaural amplification.  2.) Hearing aid follow-ups as needed  3.) Annual audiological evaluation or sooner if hearing changes

## 2023-06-21 ENCOUNTER — TELEPHONE (OUTPATIENT)
Dept: AUDIOLOGY | Facility: CLINIC | Age: 73
End: 2023-06-21
Payer: MEDICARE

## 2023-06-21 NOTE — TELEPHONE ENCOUNTER
Called Dr. Renteria and told him the power source for his hearing aid  is here and ready for him to  at the .     Aline Fontanez, CCC-A

## 2023-08-21 ENCOUNTER — TELEPHONE (OUTPATIENT)
Dept: FAMILY MEDICINE | Facility: CLINIC | Age: 73
End: 2023-08-21
Payer: MEDICARE

## 2023-08-21 DIAGNOSIS — E78.5 HYPERLIPIDEMIA, UNSPECIFIED HYPERLIPIDEMIA TYPE: ICD-10-CM

## 2023-08-21 DIAGNOSIS — N40.0 BENIGN PROSTATIC HYPERPLASIA, UNSPECIFIED WHETHER LOWER URINARY TRACT SYMPTOMS PRESENT: ICD-10-CM

## 2023-08-21 DIAGNOSIS — I10 HYPERTENSION, UNSPECIFIED TYPE: ICD-10-CM

## 2023-08-21 RX ORDER — TAMSULOSIN HYDROCHLORIDE 0.4 MG/1
0.4 CAPSULE ORAL DAILY
Qty: 90 CAPSULE | Refills: 3 | Status: SHIPPED | OUTPATIENT
Start: 2023-08-21 | End: 2023-11-14 | Stop reason: SDUPTHER

## 2023-08-21 RX ORDER — AMLODIPINE BESYLATE 10 MG/1
10 TABLET ORAL DAILY
Qty: 90 TABLET | Refills: 3 | Status: SHIPPED | OUTPATIENT
Start: 2023-08-21 | End: 2023-11-14 | Stop reason: SDUPTHER

## 2023-08-21 RX ORDER — VALSARTAN 40 MG/1
40 TABLET ORAL DAILY
Qty: 90 TABLET | Refills: 3 | Status: SHIPPED | OUTPATIENT
Start: 2023-08-21 | End: 2023-11-14 | Stop reason: SDUPTHER

## 2023-08-21 RX ORDER — PRAVASTATIN SODIUM 40 MG/1
40 TABLET ORAL DAILY
Qty: 90 TABLET | Refills: 4 | Status: SHIPPED | OUTPATIENT
Start: 2023-08-21 | End: 2023-11-14 | Stop reason: SDUPTHER

## 2023-08-21 NOTE — TELEPHONE ENCOUNTER
Care Due:                  Date            Visit Type   Department     Provider  --------------------------------------------------------------------------------                                [facility]                              PRIMARY      MED/ INTERNAL  Elier Beltre  Last Visit: 09-      CARE ([facility])   MED/ PEDS      Ehrensing  Next Visit: None Scheduled  None         None Found                                                            Last  Test          Frequency    Reason                     Performed    Due Date  --------------------------------------------------------------------------------    Office Visit  12 months..  amLODIPine, pravastatin,   09- 08-                             tamsulosin...............    CMP.........  12 months..  pravastatin..............  09- 08-    Lipid Panel.  12 months..  pravastatin..............  09- 08-    [facility] Embedded Care Due Messages. Reference number: [id number].   8/21/2023 12:08:18 PM CDT

## 2023-08-21 NOTE — TELEPHONE ENCOUNTER
----- Message from Sebastian Esteban MA sent at 8/21/2023 12:12 PM CDT -----  Type: Patient Call Back    Who called: Self    What is the request in detail: pt. Has changed pharmacy and needs  his medication to be switched over ..     Can the clinic reply by [facility]?No    Would the patient rather a call back or a response via [facility]? Yes, call     Best call back number: [phone] (home)

## 2023-08-21 NOTE — TELEPHONE ENCOUNTER
Pt states he also takes Valsartan 40mg qd added to his refill and you stated you will be glad to do it.

## 2024-06-14 ENCOUNTER — CLINICAL SUPPORT (OUTPATIENT)
Dept: AUDIOLOGY | Facility: CLINIC | Age: 74
End: 2024-06-14
Payer: MEDICARE

## 2024-06-14 DIAGNOSIS — H90.3 SENSORINEURAL HEARING LOSS (SNHL) OF BOTH EARS: Primary | ICD-10-CM

## 2024-06-14 NOTE — PROGRESS NOTES
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 Dr. Renteria's main reported that the left hearing aid is not charging.    HEARING AID DETAILS:    Right ear:  Serial number:  [id number]                 :  Phonak  Model:  Audeo M90-R  Type: TIA          Color:  Graphite Grey  Battery size:  Rechargable  Tube length:  1 M  Speaker power:  Medium  Dome size and style:  Large open  Warranty expiration:  9/1/22  L and D expiration:  9/1/22    Left ear:  Serial number:  [id number]  :  Phonak  Model:  Audeo M90-R  Type: TIA          Color:  Graphite Grey  Battery size:  Rechargable  Tube length:  1 M  Speaker power:  Medium  Dome size and style:  Large open  Warranty expiration:  9/1/22  L and D expiration:  9/1/22      Sending hearing aids to [employer] [facility] for out-of-warranty repair. Will call Dr. Renteria when hearing aids are delivered.    Recommendations:  1.) Continue daily use of binaural amplification.  2.) Hearing aid follow-ups as needed  3.) Annual audiological evaluation or sooner if hearing changes

## 2024-06-21 ENCOUNTER — CLINICAL SUPPORT (OUTPATIENT)
Dept: AUDIOLOGY | Facility: CLINIC | Age: 74
End: 2024-06-21
Payer: MEDICARE

## 2024-06-21 DIAGNOSIS — H90.3 SENSORINEURAL HEARING LOSS (SNHL) OF BOTH EARS: Primary | ICD-10-CM

## 2024-06-24 NOTE — PROGRESS NOTES
HEARING AID FOLLOW-UP    Mr. 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 Mr. Renteria's main picked up his repaired hearing aids and new . Will follow up as needed.    Recommendations:  1.) Continue daily use of binaural amplification.  2.) Hearing aid follow-ups as needed  3.) Annual audiological evaluation or sooner if hearing changes

## 2024-09-20 ENCOUNTER — OFFICE VISIT (OUTPATIENT)
Dept: FAMILY MEDICINE | Facility: CLINIC | Age: 74
End: 2024-09-20
Payer: MEDICARE

## 2024-09-20 VITALS
HEIGHT: 66 IN | TEMPERATURE: 98 F | HEART RATE: 73 BPM | DIASTOLIC BLOOD PRESSURE: 74 MMHG | WEIGHT: 187.81 LBS | SYSTOLIC BLOOD PRESSURE: 136 MMHG | OXYGEN SATURATION: 96 % | BODY MASS INDEX: 30.18 KG/M2

## 2024-09-20 DIAGNOSIS — N18.4 STAGE 4 CHRONIC KIDNEY DISEASE: ICD-10-CM

## 2024-09-20 DIAGNOSIS — R73.9 HYPERGLYCEMIA: ICD-10-CM

## 2024-09-20 DIAGNOSIS — D64.9 ANEMIA, UNSPECIFIED TYPE: ICD-10-CM

## 2024-09-20 DIAGNOSIS — G47.33 OSA (OBSTRUCTIVE SLEEP APNEA): ICD-10-CM

## 2024-09-20 DIAGNOSIS — Z12.5 SCREENING FOR PROSTATE CANCER: ICD-10-CM

## 2024-09-20 DIAGNOSIS — D86.9 SARCOIDOSIS: ICD-10-CM

## 2024-09-20 DIAGNOSIS — N40.0 BENIGN PROSTATIC HYPERPLASIA, UNSPECIFIED WHETHER LOWER URINARY TRACT SYMPTOMS PRESENT: ICD-10-CM

## 2024-09-20 DIAGNOSIS — M51.9 LUMBAR DISC DISEASE: ICD-10-CM

## 2024-09-20 DIAGNOSIS — E78.5 HYPERLIPIDEMIA, UNSPECIFIED HYPERLIPIDEMIA TYPE: ICD-10-CM

## 2024-09-20 DIAGNOSIS — I10 HYPERTENSION, UNSPECIFIED TYPE: Primary | ICD-10-CM

## 2024-09-20 DIAGNOSIS — L40.50 PSORIATIC ARTHRITIS: ICD-10-CM

## 2024-09-20 DIAGNOSIS — Z00.00 ROUTINE MEDICAL EXAM: ICD-10-CM

## 2024-09-20 DIAGNOSIS — Z86.010 HISTORY OF COLONIC POLYPS: ICD-10-CM

## 2024-09-20 PROBLEM — N18.32 STAGE 3B CHRONIC KIDNEY DISEASE: Status: RESOLVED | Noted: 2024-09-20 | Resolved: 2024-09-20

## 2024-09-20 PROBLEM — N18.32 STAGE 3B CHRONIC KIDNEY DISEASE: Status: ACTIVE | Noted: 2024-09-20

## 2024-09-20 PROBLEM — N18.30 STAGE 3 CHRONIC KIDNEY DISEASE: Status: RESOLVED | Noted: 2018-03-14 | Resolved: 2024-09-20

## 2024-09-20 PROCEDURE — 99213 OFFICE O/P EST LOW 20 MIN: CPT | Mod: PBBFAC,PO | Performed by: INTERNAL MEDICINE

## 2024-09-20 PROCEDURE — 99999 PR PBB SHADOW E&M-EST. PATIENT-LVL III: CPT | Mod: PBBFAC,,, | Performed by: INTERNAL MEDICINE

## 2024-09-20 RX ORDER — KETOROLAC TROMETHAMINE 5 MG/ML
1 SOLUTION OPHTHALMIC 4 TIMES DAILY
COMMUNITY
Start: 2024-09-10

## 2024-09-20 RX ORDER — ALBUTEROL SULFATE 90 UG/1
2 INHALANT RESPIRATORY (INHALATION) EVERY 4 HOURS PRN
COMMUNITY
Start: 2024-01-05 | End: 2025-01-04

## 2024-09-20 RX ORDER — PREDNISONE 5 MG/1
5 TABLET ORAL
COMMUNITY
Start: 2024-07-15

## 2024-09-20 RX ORDER — CIPROFLOXACIN HYDROCHLORIDE 3 MG/ML
1 SOLUTION/ DROPS OPHTHALMIC 4 TIMES DAILY
COMMUNITY
Start: 2024-09-10

## 2024-09-20 RX ORDER — PREDNISOLONE ACETATE 10 MG/ML
1 SUSPENSION/ DROPS OPHTHALMIC 4 TIMES DAILY
COMMUNITY
Start: 2024-09-10

## 2024-09-20 NOTE — PROGRESS NOTES
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 Last seen 2022.  His chart was reviewed and problem list and history updated by myself. He is followed by urology for BPH.  Labs reviewed and looks like he has had some kidney disease which by biopsy years ago was felt to be obstructive but then had another biopsy at [place] which showed renal sarcoid.  Apparently no sarcoid anywhere else.  Treated with high-dose steroids and now on Plaquenil.  Followed by Dr. Lakhani  He had some anemia associated with the admissions for his surgeries/joint replacements.  He was told by Nephrology that they only need to see him once a year.      Patient does present for his physical although he now has Medicare which does not formally cover physical his health maintenance and risk assessment will be done either way.    Reviewed hospitalization for a viral pneumonia in December 2023.  He did have some worsening of renal function but appears to have been improving by his last lab work.  Will update.  He does get some wheezing whenever he gets bronchitis.  He was given an albuterol inhaler.  He was seen by Pulmonary while in the hospital.    12/22 colonoscopy with  [facility] w 2 polyps with no repeat rec  ??  5 yrs best -2027.  Discussed that given history of polyps he will still be in his 70s and should still continue screening    Open to getting labs . 12/23 labs w GFR below 30    Blood pressure occasionally 150 at the office, 160 priory but sometimes runs more normal.  He was only on amlodipine but we added valsartan last year and blood pressure looks better.  He might have been on Lotrel in the past.  He does follow with an outside nephrologist and will have him check blood pressure at home and also touch base with Nephrology to assess if indeed there is any particular reason he was taken off of the ACE-inhibitor such as hyperkalemia or rise in creatinine.  Added  valsartan in       ROS:   CONST: weight stable. EYES: no vision change. ENT: no sore throat. CV: no chest pain w/ exertion. RESP: no shortness of breath. GI: no nausea, vomiting, diarrhea. No dysphagia. : no urinary issues. MUSCULOSKELETAL: no new myalgias or arthralgias. SKIN: no new changes. NEURO: no focal deficits. PSYCH: no new issues. ENDOCRINE: no polyuria. HEME: no lymph nodes. ALLERGY: no general pruritis.    Past Medical History:   Diagnosis Date    Benign prostatic hyperplasia 3/14/2018    Betsy    Brainstem infarction     without residual effects    Erectile dysfunction 3/14/2018    Hyperlipidemia 1/10/2014    Hypertension     Lumbar disc disease     SAMANTHA (obstructive sleep apnea)     Home study     Osteoarthritis of hips, both hips replaced 2017    Psoriatic arthritis     Stage 3 chronic kidney disease, apparent baseline creatinine 2.2 --- GFR below 30 - stage 4 2018    CKD after surgeries -Bx  with Obstruction, second biopsy with sarcoid,-seeing outside NephrologistDr. Lakhani   Left knee replaced  Colon polyps, Dr. Alberto, due .  colonoscopy with  [facility] w 2 polyps with no repeat rec  ??  5 yrs best    Past Surgical History:   Procedure Laterality Date    ANKLE SURGERY Right     right subtalar arthrodesis -Treuting    BACK SURGERY      FRACTURE SURGERY      JOINT REPLACEMENT      Rt total hip    KNEE SURGERY  2017    TKA    ROTATOR CUFF REPAIR Right      Social History     Socioeconomic History    Marital status:    Occupational History     Employer: / [employer]   Tobacco Use    Smoking status: Former     Types: Pipe     Quit date:      Years since quittin.7    Smokeless tobacco: Never    Tobacco comments:     Patient Quit Smoking a Pipe in .   Substance and Sexual Activity    Alcohol use: Yes     Alcohol/week: 0.0 standard drinks of alcohol     Comment: occassional    Drug use: No     Social Determinants of Health      Financial Resource Strain: Low Risk  (6/14/2024)    Overall Financial Resource Strain (CARDIA)     Difficulty of Paying Living Expenses: Not hard at all   Food Insecurity: No Food Insecurity (6/14/2024)    Hunger Vital Sign     Worried About Running Out of Food in the Last Year: Never true     Ran Out of Food in the Last Year: Never true   Transportation Needs: No Transportation Needs (8/31/2022)    PRAPARE - Transportation     Lack of Transportation (Medical): No     Lack of Transportation (Non-Medical): No   Physical Activity: Sufficiently Active (6/14/2024)    Exercise Vital Sign     Days of Exercise per Week: 4 days     Minutes of Exercise per Session: 60 min   Stress: No Stress Concern Present (6/14/2024)    [employer] - Occupational Stress Questionnaire     Feeling of Stress : Not at all   Housing Stability: Low Risk  (8/31/2022)    Housing Stability Vital Sign     Unable to Pay for Housing in the Last Year: No     Number of Places Lived in the Last Year: 1     Unstable Housing in the Last Year: No     family history includes Cancer in his mother; Hypertension in his father; Stroke in his father.      Gen: no distress  EYES: conjunctiva clear, non-icteric, PERRL  ENT: nose clear, nasal mucosa normal, oropharynx clear and moist, teeth good  NECK:supple, thyroid non-palpable  RESP: effort is good, lungs clear  CV: heart RRR w/o murmur, gallops or rubs; no carotid bruits, no edema  GI: abdomen soft, non-distended, non-tender, no hepatosplenomegaly  MS: gait w limp, no clubbing or cyanosis of the digits  SKIN: no rashes, warm to touch.  Cryotherapy burns on nose and scalp     Over 70 min  minutes of total time spent on the encounter, which includes face to face time and non-face to face time preparing to see the patient (eg, review of tests), Obtaining and/or reviewing separately obtained history, Documenting clinical information in the electronic or other health record,  Independently interpreting results (not separately reported) and communicating results to the patient/family/caregiver, or Care coordination (not separately reported).      Diagnoses and all orders for this visit:    Hypertension, unspecified type, chronic and stable, check associated lab  -     TSH; Future  -     CBC Auto Differential; Future  -     Comprehensive Metabolic Panel; Future  -     Lipid Panel; Future  -     Hemoglobin A1C; Future    Routine medical exam, overdue for PSA, continue colon screening every five years given history of polyps  -     Prostate Specific Antigen, Diagnostic; Future  -     TSH; Future  -     CBC Auto Differential; Future  -     Comprehensive Metabolic Panel; Future  -     Lipid Panel; Future  -     Hemoglobin A1C; Future  -     Urinalysis; Future  -     Microalbumin/Creatinine Ratio, Urine; Future    Screening for prostate cancer  -     Prostate Specific Antigen, Diagnostic; Future    Hyperlipidemia, unspecified hyperlipidemia type  -     Comprehensive Metabolic Panel; Future  -     Lipid Panel; Future    Benign prostatic hyperplasia, unspecified whether lower urinary tract symptoms present, currently off Flomax due to upcoming cataract surgery, no worsening symptoms  -     Prostate Specific Antigen, Diagnostic; Future    Anemia, unspecified type, reassess    Sarcoidosis, apparently chronic and stable    SAMANTHA (obstructive sleep apnea)    Lumbar disc disease, quiet at this time    Psoriatic arthritis, appears to be quiet    History of colonic polyps    Stage 4 chronic kidney disease, reassess  -     Comprehensive Metabolic Panel; Future  -     Urinalysis; Future  -     Microalbumin/Creatinine Ratio, Urine; Future    Hyperglycemia  -     Hemoglobin A1C; Future

## 2024-09-26 ENCOUNTER — LAB VISIT (OUTPATIENT)
Dept: LAB | Facility: HOSPITAL | Age: 74
End: 2024-09-26
Attending: INTERNAL MEDICINE
Payer: MEDICARE

## 2024-09-26 DIAGNOSIS — Z12.5 SCREENING FOR PROSTATE CANCER: ICD-10-CM

## 2024-09-26 DIAGNOSIS — R73.9 HYPERGLYCEMIA: ICD-10-CM

## 2024-09-26 DIAGNOSIS — E78.5 HYPERLIPIDEMIA, UNSPECIFIED HYPERLIPIDEMIA TYPE: ICD-10-CM

## 2024-09-26 DIAGNOSIS — I10 HYPERTENSION, UNSPECIFIED TYPE: ICD-10-CM

## 2024-09-26 DIAGNOSIS — N40.0 BENIGN PROSTATIC HYPERPLASIA, UNSPECIFIED WHETHER LOWER URINARY TRACT SYMPTOMS PRESENT: ICD-10-CM

## 2024-09-26 DIAGNOSIS — Z00.00 ROUTINE MEDICAL EXAM: ICD-10-CM

## 2024-09-26 DIAGNOSIS — N18.4 STAGE 4 CHRONIC KIDNEY DISEASE: ICD-10-CM

## 2024-09-26 LAB
ALBUMIN SERPL BCP-MCNC: 3.8 G/DL (ref 3.5–5.2)
ALBUMIN/CREAT UR: 62.5 UG/MG (ref 0–30)
ALP SERPL-CCNC: 43 U/L (ref 55–135)
ALT SERPL W/O P-5'-P-CCNC: 21 U/L (ref 10–44)
ANION GAP SERPL CALC-SCNC: 12 MMOL/L (ref 8–16)
AST SERPL-CCNC: 22 U/L (ref 10–40)
BASOPHILS # BLD AUTO: 0.06 K/UL (ref 0–0.2)
BASOPHILS NFR BLD: 0.9 % (ref 0–1.9)
BILIRUB SERPL-MCNC: 0.5 MG/DL (ref 0.1–1)
BILIRUB UR QL STRIP: NEGATIVE
BUN SERPL-MCNC: 28 MG/DL (ref 8–23)
CALCIUM SERPL-MCNC: 9.7 MG/DL (ref 8.7–10.5)
CHLORIDE SERPL-SCNC: 104 MMOL/L (ref 95–110)
CHOLEST SERPL-MCNC: 182 MG/DL (ref 120–199)
CHOLEST/HDLC SERPL: 2.6 {RATIO} (ref 2–5)
CLARITY UR REFRACT.AUTO: CLEAR
CO2 SERPL-SCNC: 23 MMOL/L (ref 23–29)
COLOR UR AUTO: YELLOW
COMPLEXED PSA SERPL-MCNC: 3.5 NG/ML (ref 0–4)
CREAT SERPL-MCNC: 2.3 MG/DL (ref 0.5–1.4)
CREAT UR-MCNC: 88 MG/DL (ref 23–375)
DIFFERENTIAL METHOD BLD: ABNORMAL
EOSINOPHIL # BLD AUTO: 0.3 K/UL (ref 0–0.5)
EOSINOPHIL NFR BLD: 4.4 % (ref 0–8)
ERYTHROCYTE [DISTWIDTH] IN BLOOD BY AUTOMATED COUNT: 13.7 % (ref 11.5–14.5)
EST. GFR  (NO RACE VARIABLE): 29.4 ML/MIN/1.73 M^2
ESTIMATED AVG GLUCOSE: 97 MG/DL (ref 68–131)
GLUCOSE SERPL-MCNC: 85 MG/DL (ref 70–110)
GLUCOSE UR QL STRIP: ABNORMAL
HBA1C MFR BLD: 5 % (ref 4–5.6)
HCT VFR BLD AUTO: 44.6 % (ref 40–54)
HDLC SERPL-MCNC: 70 MG/DL (ref 40–75)
HDLC SERPL: 38.5 % (ref 20–50)
HGB BLD-MCNC: 14.6 G/DL (ref 14–18)
HGB UR QL STRIP: NEGATIVE
IMM GRANULOCYTES # BLD AUTO: 0.04 K/UL (ref 0–0.04)
IMM GRANULOCYTES NFR BLD AUTO: 0.6 % (ref 0–0.5)
KETONES UR QL STRIP: NEGATIVE
LDLC SERPL CALC-MCNC: 97.8 MG/DL (ref 63–159)
LEUKOCYTE ESTERASE UR QL STRIP: NEGATIVE
LYMPHOCYTES # BLD AUTO: 1.9 K/UL (ref 1–4.8)
LYMPHOCYTES NFR BLD: 29.1 % (ref 18–48)
MCH RBC QN AUTO: 34.4 PG (ref 27–31)
MCHC RBC AUTO-ENTMCNC: 32.7 G/DL (ref 32–36)
MCV RBC AUTO: 105 FL (ref 82–98)
MICROALBUMIN UR DL<=1MG/L-MCNC: 55 UG/ML
MONOCYTES # BLD AUTO: 0.7 K/UL (ref 0.3–1)
MONOCYTES NFR BLD: 11.5 % (ref 4–15)
NEUTROPHILS # BLD AUTO: 3.4 K/UL (ref 1.8–7.7)
NEUTROPHILS NFR BLD: 53.5 % (ref 38–73)
NITRITE UR QL STRIP: NEGATIVE
NONHDLC SERPL-MCNC: 112 MG/DL
NRBC BLD-RTO: 0 /100 WBC
PH UR STRIP: 6 [PH] (ref 5–8)
PLATELET # BLD AUTO: 223 K/UL (ref 150–450)
PMV BLD AUTO: 10.6 FL (ref 9.2–12.9)
POTASSIUM SERPL-SCNC: 3.9 MMOL/L (ref 3.5–5.1)
PROT SERPL-MCNC: 6.9 G/DL (ref 6–8.4)
PROT UR QL STRIP: ABNORMAL
RBC # BLD AUTO: 4.24 M/UL (ref 4.6–6.2)
SODIUM SERPL-SCNC: 139 MMOL/L (ref 136–145)
SP GR UR STRIP: 1.01 (ref 1–1.03)
TRIGL SERPL-MCNC: 71 MG/DL (ref 30–150)
TSH SERPL DL<=0.005 MIU/L-ACNC: 1.37 UIU/ML (ref 0.4–4)
URN SPEC COLLECT METH UR: ABNORMAL
WBC # BLD AUTO: 6.43 K/UL (ref 3.9–12.7)

## 2024-09-26 PROCEDURE — 36415 COLL VENOUS BLD VENIPUNCTURE: CPT | Mod: PO | Performed by: INTERNAL MEDICINE

## 2024-09-26 PROCEDURE — 82570 ASSAY OF URINE CREATININE: CPT | Performed by: INTERNAL MEDICINE

## 2024-09-26 PROCEDURE — 80053 COMPREHEN METABOLIC PANEL: CPT | Performed by: INTERNAL MEDICINE

## 2024-09-26 PROCEDURE — 84153 ASSAY OF PSA TOTAL: CPT | Performed by: INTERNAL MEDICINE

## 2024-09-26 PROCEDURE — 81003 URINALYSIS AUTO W/O SCOPE: CPT | Performed by: INTERNAL MEDICINE

## 2024-09-26 PROCEDURE — 85025 COMPLETE CBC W/AUTO DIFF WBC: CPT | Performed by: INTERNAL MEDICINE

## 2024-09-26 PROCEDURE — 83036 HEMOGLOBIN GLYCOSYLATED A1C: CPT | Performed by: INTERNAL MEDICINE

## 2024-09-26 PROCEDURE — 82043 UR ALBUMIN QUANTITATIVE: CPT | Performed by: INTERNAL MEDICINE

## 2024-09-26 PROCEDURE — 80061 LIPID PANEL: CPT | Performed by: INTERNAL MEDICINE

## 2024-09-26 PROCEDURE — 84443 ASSAY THYROID STIM HORMONE: CPT | Performed by: INTERNAL MEDICINE

## 2024-11-14 DIAGNOSIS — E78.5 HYPERLIPIDEMIA, UNSPECIFIED HYPERLIPIDEMIA TYPE: ICD-10-CM

## 2024-11-14 DIAGNOSIS — I10 HYPERTENSION, UNSPECIFIED TYPE: ICD-10-CM

## 2024-11-14 DIAGNOSIS — N40.0 BENIGN PROSTATIC HYPERPLASIA, UNSPECIFIED WHETHER LOWER URINARY TRACT SYMPTOMS PRESENT: ICD-10-CM

## 2024-11-14 RX ORDER — AMLODIPINE BESYLATE 10 MG/1
10 TABLET ORAL DAILY
Qty: 90 TABLET | Refills: 3 | Status: SHIPPED | OUTPATIENT
Start: 2024-11-14

## 2024-11-14 RX ORDER — PRAVASTATIN SODIUM 40 MG/1
40 TABLET ORAL DAILY
Qty: 90 TABLET | Refills: 4 | Status: SHIPPED | OUTPATIENT
Start: 2024-11-14

## 2024-11-14 RX ORDER — VALSARTAN 40 MG/1
40 TABLET ORAL DAILY
Qty: 90 TABLET | Refills: 3 | Status: SHIPPED | OUTPATIENT
Start: 2024-11-14 | End: 2025-11-14

## 2024-11-14 RX ORDER — TAMSULOSIN HYDROCHLORIDE 0.4 MG/1
0.4 CAPSULE ORAL DAILY
Qty: 90 CAPSULE | Refills: 3 | Status: SHIPPED | OUTPATIENT
Start: 2024-11-14

## 2024-11-14 NOTE — TELEPHONE ENCOUNTER
No care due was identified.  [facility] Embedded Care Due Messages. Reference number: [id number].   11/14/2024 8:13:17 AM CST

## 2025-02-22 DIAGNOSIS — Z00.00 ENCOUNTER FOR MEDICARE ANNUAL WELLNESS EXAM: ICD-10-CM

## 2025-07-31 DIAGNOSIS — N18.30 STAGE 3 CHRONIC KIDNEY DISEASE, UNSPECIFIED WHETHER STAGE 3A OR 3B CKD: Primary | ICD-10-CM

## 2025-08-13 ENCOUNTER — TELEPHONE (OUTPATIENT)
Dept: FAMILY MEDICINE | Facility: CLINIC | Age: 75
End: 2025-08-13
Payer: MEDICARE

## (undated) DEVICE — SYR 50CC LL

## (undated) DEVICE — Device

## (undated) DEVICE — DURAPREP SURG SCRUB 26ML

## (undated) DEVICE — LINER GLOVE POWDERFREE SZ 7.5

## (undated) DEVICE — SUT VICRYL 2-0 36 CT-1

## (undated) DEVICE — BLANKET UPPER BODY 78.7X29.9IN

## (undated) DEVICE — LINER GLOVE POWDERFREE 8

## (undated) DEVICE — COVER OVERHEAD SURG LT BLUE

## (undated) DEVICE — SEE MEDLINE ITEM 154981

## (undated) DEVICE — SOL SOD CHLORIDE 0.9% 10ML

## (undated) DEVICE — SOL IRR NACL .9% 3000ML

## (undated) DEVICE — ELECTRODE REM PLYHSV RETURN 9

## (undated) DEVICE — CAUTERY PUSHBUTTON PENCIL

## (undated) DEVICE — SUT 2/0 36IN COATED VICRYL

## (undated) DEVICE — MAT QUICK 40X30 FLOOR FLUID LF

## (undated) DEVICE — GLOVE BIOGEL ORTHOPEDIC 7.5

## (undated) DEVICE — SYR ONLY LUER LOCK 20CC

## (undated) DEVICE — NDL SPINAL 20GX3.5 HUB

## (undated) DEVICE — PUMP COLD THERAPY

## (undated) DEVICE — DRESSING AQUACEL AG 3.5X10IN

## (undated) DEVICE — SOL NACL 0.9% INJ 250ML BG

## (undated) DEVICE — BIG TABLE COVER

## (undated) DEVICE — SUT 1 18IN COATED VICRYL U

## (undated) DEVICE — BLADE SAW OSC ME-92 COATED

## (undated) DEVICE — SUT ETHIBOND EXCEL 1 CTX 18

## (undated) DEVICE — PIN THREADED STERILE
Type: IMPLANTABLE DEVICE | Site: KNEE | Status: NON-FUNCTIONAL
Removed: 2017-02-13

## (undated) DEVICE — PAD TRACTION BOOT

## (undated) DEVICE — PAD K-THERMIA 24IN X 60IN

## (undated) DEVICE — SEALER BIPOLAR TISSUE 6.0

## (undated) DEVICE — NDL 18GA X1 1/2 REG BEVEL

## (undated) DEVICE — SUT VICRYL PLUS ANTIBACT

## (undated) DEVICE — SUPPORT ULNA NERVE PROTECTOR

## (undated) DEVICE — PULSAVAC ZIMMER

## (undated) DEVICE — CONTAINER SPECIMEN STRL 4OZ

## (undated) DEVICE — CLIPPER BLADE MOD 4406 (CAREF)

## (undated) DEVICE — GLOVE SURGICAL LATEX SZ 8

## (undated) DEVICE — PAD COLD THERAPY KNEE WRAP ON

## (undated) DEVICE — BLADE SAW OSCILLATING

## (undated) DEVICE — KIT PT CARE HANA PROFX SSXT

## (undated) DEVICE — DRAPE HIP TIBURON 87X115X134

## (undated) DEVICE — BLADE SAW OSC 25.4X90X1.27 ST

## (undated) DEVICE — ELECTRODE BLADE TEFLON 6

## (undated) DEVICE — NEPTUNE 4 PORT MANIFOLD

## (undated) DEVICE — CUFF TOURNIQUET DL PRT

## (undated) DEVICE — PIN TEMPORARY
Type: IMPLANTABLE DEVICE | Site: KNEE | Status: NON-FUNCTIONAL
Removed: 2017-02-13

## (undated) DEVICE — DRAPE C-ARM FOR MOBILE XRAY

## (undated) DEVICE — KIT TOTAL HIP OPTIVAC